# Patient Record
Sex: FEMALE | Race: WHITE | Employment: UNEMPLOYED | URBAN - METROPOLITAN AREA
[De-identification: names, ages, dates, MRNs, and addresses within clinical notes are randomized per-mention and may not be internally consistent; named-entity substitution may affect disease eponyms.]

---

## 2017-10-16 PROBLEM — E04.2 MULTINODULAR GOITER: Status: ACTIVE | Noted: 2017-10-16

## 2017-10-16 PROBLEM — E03.9 PRIMARY HYPOTHYROIDISM: Status: ACTIVE | Noted: 2017-10-16

## 2021-11-19 ENCOUNTER — HOSPITAL ENCOUNTER (OUTPATIENT)
Dept: LAB | Age: 66
Discharge: HOME | End: 2021-11-19
Payer: MEDICARE

## 2021-11-19 DIAGNOSIS — E03.9 PRIMARY HYPOTHYROIDISM: ICD-10-CM

## 2021-11-19 LAB — TSH W FREE THYROID I,TSHELE: 1.53 UIU/ML (ref 0.36–3.74)

## 2021-11-19 PROCEDURE — 36415 COLL VENOUS BLD VENIPUNCTURE: CPT

## 2021-11-19 PROCEDURE — 84443 ASSAY THYROID STIM HORMONE: CPT

## 2022-03-14 PROBLEM — H25.813 COMBINED FORMS OF AGE-RELATED CATARACT OF BOTH EYES: Status: ACTIVE | Noted: 2020-10-28

## 2022-03-14 PROBLEM — M79.641 BILATERAL HAND PAIN: Status: ACTIVE | Noted: 2019-09-30

## 2022-03-14 PROBLEM — H35.363 FAMILIAL DRUSEN OF BOTH EYES: Status: ACTIVE | Noted: 2020-10-28

## 2022-03-14 PROBLEM — M54.12 CERVICAL RADICULOPATHY: Status: ACTIVE | Noted: 2017-04-19

## 2022-03-14 PROBLEM — H35.30 AMD (AGE-RELATED MACULAR DEGENERATION), BILATERAL: Status: ACTIVE | Noted: 2021-01-11

## 2022-03-14 PROBLEM — H11.123 CONJUNCTIVAL CONCRETIONS OF BOTH EYES: Status: ACTIVE | Noted: 2020-11-23

## 2022-03-14 PROBLEM — G54.0 BRACHIAL PLEXOPATHY: Status: ACTIVE | Noted: 2020-06-16

## 2022-03-14 PROBLEM — H04.303 BILATERAL DACRYOCYSTITIS: Status: ACTIVE | Noted: 2021-02-12

## 2022-03-14 PROBLEM — M19.011 ARTHRITIS OF RIGHT ACROMIOCLAVICULAR JOINT: Status: ACTIVE | Noted: 2020-06-03

## 2022-03-14 PROBLEM — K22.70 BARRETT'S ESOPHAGUS: Status: ACTIVE | Noted: 2017-12-28

## 2022-03-14 PROBLEM — M76.899 ENTHESOPATHY OF HIP REGION: Status: ACTIVE | Noted: 2018-07-12

## 2022-03-14 PROBLEM — L72.0: Status: ACTIVE | Noted: 2020-10-28

## 2022-03-14 PROBLEM — R05.9 COUGH: Status: ACTIVE | Noted: 2020-03-24

## 2022-03-14 PROBLEM — M25.511 ACUTE PAIN OF RIGHT SHOULDER: Status: ACTIVE | Noted: 2020-05-15

## 2022-03-14 PROBLEM — I25.118 ATHEROSCLEROTIC HEART DISEASE OF NATIVE CORONARY ARTERY WITH OTHER FORMS OF ANGINA PECTORIS (HCC): Status: ACTIVE | Noted: 2022-03-14

## 2022-03-14 PROBLEM — L20.82 FLEXURAL ECZEMA: Status: ACTIVE | Noted: 2022-03-14

## 2022-03-14 PROBLEM — M79.642 BILATERAL HAND PAIN: Status: ACTIVE | Noted: 2019-09-30

## 2022-03-14 PROBLEM — S63.502A WRIST SPRAIN, LEFT, INITIAL ENCOUNTER: Status: ACTIVE | Noted: 2020-09-23

## 2022-03-14 PROBLEM — H35.361 DRUSEN (DEGENERATIVE) OF MACULA, RIGHT EYE: Status: ACTIVE | Noted: 2020-10-28

## 2022-03-14 PROBLEM — M47.812 CERVICAL SPONDYLOSIS WITHOUT MYELOPATHY: Status: ACTIVE | Noted: 2017-04-19

## 2022-03-14 PROBLEM — H10.13 ALLERGIC CONJUNCTIVITIS OF BOTH EYES: Status: ACTIVE | Noted: 2020-10-28

## 2022-03-14 PROBLEM — M41.50 DEGENERATIVE SCOLIOSIS IN ADULT PATIENT: Status: ACTIVE | Noted: 2021-08-12

## 2022-03-14 PROBLEM — M75.21 BICEPS TENDINITIS OF RIGHT SHOULDER: Status: ACTIVE | Noted: 2020-06-03

## 2022-03-14 PROBLEM — G89.4 CHRONIC PAIN DISORDER: Status: ACTIVE | Noted: 2017-07-26

## 2022-03-14 PROBLEM — I12.9 CHRONIC KIDNEY DISEASE DUE TO HYPERTENSION: Status: ACTIVE | Noted: 2022-03-14

## 2022-03-14 PROBLEM — J30.9 ALLERGIC RHINITIS: Status: ACTIVE | Noted: 2021-10-18

## 2022-03-14 PROBLEM — K29.50 CHRONIC GASTRITIS WITHOUT BLEEDING: Status: ACTIVE | Noted: 2017-05-22

## 2022-03-18 PROBLEM — H11.123 CONJUNCTIVAL CONCRETIONS OF BOTH EYES: Status: ACTIVE | Noted: 2020-11-23

## 2022-03-18 PROBLEM — H04.303 BILATERAL DACRYOCYSTITIS: Status: ACTIVE | Noted: 2021-02-12

## 2022-03-18 PROBLEM — G89.4 CHRONIC PAIN DISORDER: Status: ACTIVE | Noted: 2017-07-26

## 2022-03-18 PROBLEM — M79.641 BILATERAL HAND PAIN: Status: ACTIVE | Noted: 2019-09-30

## 2022-03-18 PROBLEM — E04.2 MULTIPLE THYROID NODULES: Status: ACTIVE | Noted: 2017-10-16

## 2022-03-18 PROBLEM — M25.511 ACUTE PAIN OF RIGHT SHOULDER: Status: ACTIVE | Noted: 2020-05-15

## 2022-03-18 PROBLEM — H35.363 FAMILIAL DRUSEN OF BOTH EYES: Status: ACTIVE | Noted: 2020-10-28

## 2022-03-18 PROBLEM — L20.82 FLEXURAL ECZEMA: Status: ACTIVE | Noted: 2022-03-14

## 2022-03-18 PROBLEM — I12.9 CHRONIC KIDNEY DISEASE DUE TO HYPERTENSION: Status: ACTIVE | Noted: 2022-03-14

## 2022-03-18 PROBLEM — M79.642 BILATERAL HAND PAIN: Status: ACTIVE | Noted: 2019-09-30

## 2022-03-18 PROBLEM — H25.813 COMBINED FORMS OF AGE-RELATED CATARACT OF BOTH EYES: Status: ACTIVE | Noted: 2020-10-28

## 2022-03-18 PROBLEM — I25.118 ATHEROSCLEROTIC HEART DISEASE OF NATIVE CORONARY ARTERY WITH OTHER FORMS OF ANGINA PECTORIS (HCC): Status: ACTIVE | Noted: 2022-03-14

## 2022-03-18 PROBLEM — M75.21 BICEPS TENDINITIS OF RIGHT SHOULDER: Status: ACTIVE | Noted: 2020-06-03

## 2022-03-18 PROBLEM — M76.899 ENTHESOPATHY OF HIP REGION: Status: ACTIVE | Noted: 2018-07-12

## 2022-03-19 PROBLEM — H35.361 DRUSEN (DEGENERATIVE) OF MACULA, RIGHT EYE: Status: ACTIVE | Noted: 2020-10-28

## 2022-03-19 PROBLEM — K22.70 BARRETT'S ESOPHAGUS: Status: ACTIVE | Noted: 2017-12-28

## 2022-03-19 PROBLEM — K29.50 CHRONIC GASTRITIS WITHOUT BLEEDING: Status: ACTIVE | Noted: 2017-05-22

## 2022-03-19 PROBLEM — R05.9 COUGH: Status: ACTIVE | Noted: 2020-03-24

## 2022-03-19 PROBLEM — M54.12 CERVICAL RADICULOPATHY: Status: ACTIVE | Noted: 2017-04-19

## 2022-03-19 PROBLEM — L72.0: Status: ACTIVE | Noted: 2020-10-28

## 2022-03-19 PROBLEM — M47.812 CERVICAL SPONDYLOSIS WITHOUT MYELOPATHY: Status: ACTIVE | Noted: 2017-04-19

## 2022-03-19 PROBLEM — E03.9 PRIMARY HYPOTHYROIDISM: Status: ACTIVE | Noted: 2017-10-16

## 2022-03-19 PROBLEM — S63.502A WRIST SPRAIN, LEFT, INITIAL ENCOUNTER: Status: ACTIVE | Noted: 2020-09-23

## 2022-03-19 PROBLEM — M41.50 DEGENERATIVE SCOLIOSIS IN ADULT PATIENT: Status: ACTIVE | Noted: 2021-08-12

## 2022-03-19 PROBLEM — H35.30 AMD (AGE-RELATED MACULAR DEGENERATION), BILATERAL: Status: ACTIVE | Noted: 2021-01-11

## 2022-03-20 PROBLEM — J30.9 ALLERGIC RHINITIS: Status: ACTIVE | Noted: 2021-10-18

## 2022-03-20 PROBLEM — M19.011 ARTHRITIS OF RIGHT ACROMIOCLAVICULAR JOINT: Status: ACTIVE | Noted: 2020-06-03

## 2022-03-20 PROBLEM — H10.13 ALLERGIC CONJUNCTIVITIS OF BOTH EYES: Status: ACTIVE | Noted: 2020-10-28

## 2022-03-20 PROBLEM — G54.0 BRACHIAL PLEXOPATHY: Status: ACTIVE | Noted: 2020-06-16

## 2022-04-01 ENCOUNTER — HOSPITAL ENCOUNTER (OUTPATIENT)
Dept: MAMMOGRAPHY | Age: 67
Discharge: HOME | End: 2022-04-01
Attending: FAMILY MEDICINE
Payer: MEDICAID

## 2022-04-01 DIAGNOSIS — Z80.3 FAMILY HISTORY OF BREAST CANCER IN FEMALE: ICD-10-CM

## 2022-04-01 DIAGNOSIS — Z80.3 FAMILY HISTORY OF BREAST CANCER: ICD-10-CM

## 2022-04-01 DIAGNOSIS — N64.4 BREAST PAIN: ICD-10-CM

## 2022-04-01 DIAGNOSIS — N64.4 BREAST PAIN IN FEMALE: ICD-10-CM

## 2022-04-01 PROCEDURE — 76642 ULTRASOUND BREAST LIMITED: CPT

## 2022-04-01 PROCEDURE — 77066 DX MAMMO INCL CAD BI: CPT

## 2022-04-13 PROBLEM — R05.9 COUGH: Status: RESOLVED | Noted: 2020-03-24 | Resolved: 2022-04-13

## 2022-04-15 PROBLEM — K31.1 GASTRIC OUTLET OBSTRUCTION: Status: ACTIVE | Noted: 2017-12-28

## 2022-04-15 PROBLEM — N18.31 STAGE 3A CHRONIC KIDNEY DISEASE (HCC): Status: ACTIVE | Noted: 2022-04-15

## 2022-04-15 PROBLEM — K21.9 GASTROESOPHAGEAL REFLUX DISEASE WITHOUT ESOPHAGITIS: Status: ACTIVE | Noted: 2022-04-15

## 2022-04-15 PROBLEM — H43.813 PVD (POSTERIOR VITREOUS DETACHMENT), BOTH EYES: Status: ACTIVE | Noted: 2020-10-28

## 2022-04-15 PROBLEM — H35.89 MACULAR RPE MOTTLING: Status: ACTIVE | Noted: 2020-10-28

## 2022-04-15 PROBLEM — S73.199A TEAR OF ACETABULAR LABRUM: Status: ACTIVE | Noted: 2018-07-12

## 2022-04-15 PROBLEM — K59.01 SLOW TRANSIT CONSTIPATION: Status: ACTIVE | Noted: 2020-01-15

## 2022-04-15 PROBLEM — R73.03 PREDIABETES: Status: ACTIVE | Noted: 2018-11-01

## 2022-04-15 PROBLEM — E55.9 VITAMIN D DEFICIENCY: Status: ACTIVE | Noted: 2018-05-31

## 2022-04-15 PROBLEM — M54.30 SCIATICA: Status: ACTIVE | Noted: 2022-04-15

## 2022-04-15 PROBLEM — M75.41 IMPINGEMENT SYNDROME OF RIGHT SHOULDER: Status: ACTIVE | Noted: 2020-06-03

## 2022-04-15 PROBLEM — R13.14 PHARYNGOESOPHAGEAL DYSPHAGIA: Status: ACTIVE | Noted: 2017-08-29

## 2022-04-15 PROBLEM — R20.2 NUMBNESS AND TINGLING IN BOTH HANDS: Status: ACTIVE | Noted: 2019-09-30

## 2022-04-15 PROBLEM — M79.89 LEFT LEG SWELLING: Status: ACTIVE | Noted: 2021-04-09

## 2022-04-15 PROBLEM — F11.20 OPIOID DEPENDENCE (HCC): Status: ACTIVE | Noted: 2022-04-15

## 2022-04-15 PROBLEM — H52.4 HYPEROPIA WITH REGULAR ASTIGMATISM AND PRESBYOPIA, BILATERAL: Status: ACTIVE | Noted: 2021-02-12

## 2022-04-15 PROBLEM — Q61.02 MULTIPLE RENAL CYSTS: Status: ACTIVE | Noted: 2017-12-28

## 2022-04-15 PROBLEM — R21 RASH: Status: ACTIVE | Noted: 2019-10-15

## 2022-04-15 PROBLEM — F51.01 PRIMARY INSOMNIA: Status: ACTIVE | Noted: 2020-01-15

## 2022-04-15 PROBLEM — E66.9 OBESITY (BMI 30-39.9): Status: ACTIVE | Noted: 2018-04-17

## 2022-04-15 PROBLEM — D50.9 IRON DEFICIENCY ANEMIA: Status: ACTIVE | Noted: 2017-12-28

## 2022-04-15 PROBLEM — H52.223 HYPEROPIA WITH REGULAR ASTIGMATISM AND PRESBYOPIA, BILATERAL: Status: ACTIVE | Noted: 2021-02-12

## 2022-04-15 PROBLEM — E78.2 MIXED HYPERLIPIDEMIA: Status: ACTIVE | Noted: 2018-07-05

## 2022-04-15 PROBLEM — M79.605 LEFT LEG PAIN: Status: ACTIVE | Noted: 2019-07-15

## 2022-04-15 PROBLEM — S46.011A TRAUMATIC COMPLETE TEAR OF RIGHT ROTATOR CUFF: Status: ACTIVE | Noted: 2020-06-03

## 2022-04-15 PROBLEM — M70.61 TROCHANTERIC BURSITIS OF RIGHT HIP: Status: ACTIVE | Noted: 2018-07-27

## 2022-04-15 PROBLEM — H52.03 HYPEROPIA WITH REGULAR ASTIGMATISM AND PRESBYOPIA, BILATERAL: Status: ACTIVE | Noted: 2021-02-12

## 2022-04-15 PROBLEM — R20.0 NUMBNESS AND TINGLING IN BOTH HANDS: Status: ACTIVE | Noted: 2019-09-30

## 2022-04-16 PROBLEM — F11.20 OPIOID DEPENDENCE (HCC): Status: RESOLVED | Noted: 2022-04-15 | Resolved: 2022-04-16

## 2022-06-01 DIAGNOSIS — E03.9 PRIMARY HYPOTHYROIDISM: Primary | ICD-10-CM

## 2022-07-22 ENCOUNTER — OFFICE VISIT (OUTPATIENT)
Dept: FAMILY MEDICINE CLINIC | Facility: CLINIC | Age: 67
End: 2022-07-22
Payer: MEDICARE

## 2022-07-22 ENCOUNTER — HOSPITAL ENCOUNTER (OUTPATIENT)
Dept: GENERAL RADIOLOGY | Age: 67
Discharge: HOME | End: 2022-07-25
Payer: MEDICARE

## 2022-07-22 ENCOUNTER — TELEPHONE (OUTPATIENT)
Dept: FAMILY MEDICINE CLINIC | Facility: CLINIC | Age: 67
End: 2022-07-22

## 2022-07-22 VITALS
DIASTOLIC BLOOD PRESSURE: 80 MMHG | HEIGHT: 65 IN | OXYGEN SATURATION: 98 % | SYSTOLIC BLOOD PRESSURE: 130 MMHG | HEART RATE: 60 BPM | BODY MASS INDEX: 33.15 KG/M2 | WEIGHT: 199 LBS | TEMPERATURE: 98.2 F

## 2022-07-22 DIAGNOSIS — M79.672 LEFT FOOT PAIN: ICD-10-CM

## 2022-07-22 DIAGNOSIS — M25.572 ACUTE LEFT ANKLE PAIN: ICD-10-CM

## 2022-07-22 DIAGNOSIS — L08.9 FOOT INFECTION: ICD-10-CM

## 2022-07-22 DIAGNOSIS — J44.9 CHRONIC OBSTRUCTIVE PULMONARY DISEASE, UNSPECIFIED COPD TYPE (HCC): ICD-10-CM

## 2022-07-22 DIAGNOSIS — Z12.11 SCREEN FOR COLON CANCER: ICD-10-CM

## 2022-07-22 DIAGNOSIS — E55.9 VITAMIN D DEFICIENCY: ICD-10-CM

## 2022-07-22 DIAGNOSIS — R22.41 MASS OF RIGHT FOOT: Primary | ICD-10-CM

## 2022-07-22 DIAGNOSIS — E04.2 MULTIPLE THYROID NODULES: ICD-10-CM

## 2022-07-22 DIAGNOSIS — E03.9 PRIMARY HYPOTHYROIDISM: ICD-10-CM

## 2022-07-22 DIAGNOSIS — M81.0 AGE-RELATED OSTEOPOROSIS WITHOUT CURRENT PATHOLOGICAL FRACTURE: Primary | ICD-10-CM

## 2022-07-22 DIAGNOSIS — R73.03 PREDIABETES: ICD-10-CM

## 2022-07-22 DIAGNOSIS — E78.49 OTHER HYPERLIPIDEMIA: ICD-10-CM

## 2022-07-22 DIAGNOSIS — I10 PRIMARY HYPERTENSION: ICD-10-CM

## 2022-07-22 DIAGNOSIS — K22.719 BARRETT'S ESOPHAGUS WITH DYSPLASIA: ICD-10-CM

## 2022-07-22 DIAGNOSIS — R22.42 LOCALIZED SWELLING OF LEFT FOOT: ICD-10-CM

## 2022-07-22 PROBLEM — N18.31 STAGE 3A CHRONIC KIDNEY DISEASE (HCC): Status: RESOLVED | Noted: 2022-04-15 | Resolved: 2022-07-22

## 2022-07-22 LAB
25(OH)D3 SERPL-MCNC: 24.4 NG/ML (ref 30–100)
ALBUMIN SERPL-MCNC: 3.5 G/DL (ref 3.2–4.6)
ALBUMIN/GLOB SERPL: 1.1 {RATIO} (ref 1.2–3.5)
ALP SERPL-CCNC: 103 U/L (ref 50–136)
ALT SERPL-CCNC: 15 U/L (ref 12–65)
ANION GAP SERPL CALC-SCNC: 7 MMOL/L (ref 7–16)
AST SERPL-CCNC: 14 U/L (ref 15–37)
BASOPHILS # BLD: 0 K/UL (ref 0–0.2)
BASOPHILS NFR BLD: 1 % (ref 0–2)
BILIRUB SERPL-MCNC: 0.7 MG/DL (ref 0.2–1.1)
BUN SERPL-MCNC: 14 MG/DL (ref 8–23)
CALCIUM SERPL-MCNC: 9.3 MG/DL (ref 8.3–10.4)
CHLORIDE SERPL-SCNC: 110 MMOL/L (ref 98–107)
CHOLEST SERPL-MCNC: 108 MG/DL
CO2 SERPL-SCNC: 26 MMOL/L (ref 21–32)
CREAT SERPL-MCNC: 1 MG/DL (ref 0.6–1)
DIFFERENTIAL METHOD BLD: ABNORMAL
EOSINOPHIL # BLD: 0.1 K/UL (ref 0–0.8)
EOSINOPHIL NFR BLD: 1 % (ref 0.5–7.8)
ERYTHROCYTE [DISTWIDTH] IN BLOOD BY AUTOMATED COUNT: 14.2 % (ref 11.9–14.6)
GLOBULIN SER CALC-MCNC: 3.1 G/DL (ref 2.3–3.5)
GLUCOSE SERPL-MCNC: 98 MG/DL (ref 65–100)
HCT VFR BLD AUTO: 34.8 % (ref 35.8–46.3)
HDLC SERPL-MCNC: 49 MG/DL (ref 40–60)
HDLC SERPL: 2.2 {RATIO}
HGB BLD-MCNC: 11 G/DL (ref 11.7–15.4)
IMM GRANULOCYTES # BLD AUTO: 0 K/UL (ref 0–0.5)
IMM GRANULOCYTES NFR BLD AUTO: 0 % (ref 0–5)
LDLC SERPL CALC-MCNC: 42.6 MG/DL
LYMPHOCYTES # BLD: 1.2 K/UL (ref 0.5–4.6)
LYMPHOCYTES NFR BLD: 21 % (ref 13–44)
MCH RBC QN AUTO: 29.4 PG (ref 26.1–32.9)
MCHC RBC AUTO-ENTMCNC: 31.6 G/DL (ref 31.4–35)
MCV RBC AUTO: 93 FL (ref 79.6–97.8)
MONOCYTES # BLD: 0.4 K/UL (ref 0.1–1.3)
MONOCYTES NFR BLD: 7 % (ref 4–12)
NEUTS SEG # BLD: 4.1 K/UL (ref 1.7–8.2)
NEUTS SEG NFR BLD: 70 % (ref 43–78)
NRBC # BLD: 0 K/UL (ref 0–0.2)
PLATELET # BLD AUTO: 225 K/UL (ref 150–450)
PMV BLD AUTO: 11.1 FL (ref 9.4–12.3)
POTASSIUM SERPL-SCNC: 4.6 MMOL/L (ref 3.5–5.1)
PROT SERPL-MCNC: 6.6 G/DL (ref 6.3–8.2)
RBC # BLD AUTO: 3.74 M/UL (ref 4.05–5.2)
SODIUM SERPL-SCNC: 143 MMOL/L (ref 136–145)
TRIGL SERPL-MCNC: 82 MG/DL (ref 35–150)
TSH W FREE THYROID IF ABNORMAL: 0.9 UIU/ML (ref 0.36–3.74)
URATE SERPL-MCNC: 4.4 MG/DL (ref 2.6–6)
VLDLC SERPL CALC-MCNC: 16.4 MG/DL (ref 6–23)
WBC # BLD AUTO: 5.9 K/UL (ref 4.3–11.1)

## 2022-07-22 PROCEDURE — 1123F ACP DISCUSS/DSCN MKR DOCD: CPT | Performed by: FAMILY MEDICINE

## 2022-07-22 PROCEDURE — 73620 X-RAY EXAM OF FOOT: CPT

## 2022-07-22 PROCEDURE — 73600 X-RAY EXAM OF ANKLE: CPT

## 2022-07-22 PROCEDURE — 99214 OFFICE O/P EST MOD 30 MIN: CPT | Performed by: FAMILY MEDICINE

## 2022-07-22 RX ORDER — NAPROXEN 500 MG/1
500 TABLET ORAL 2 TIMES DAILY WITH MEALS
Qty: 20 TABLET | Refills: 0 | Status: SHIPPED | OUTPATIENT
Start: 2022-07-22 | End: 2022-07-22 | Stop reason: CLARIF

## 2022-07-22 RX ORDER — CEPHALEXIN 500 MG/1
500 CAPSULE ORAL 2 TIMES DAILY
Qty: 14 CAPSULE | Refills: 0 | Status: SHIPPED | OUTPATIENT
Start: 2022-07-22 | End: 2022-07-29

## 2022-07-22 RX ORDER — NAPROXEN 375 MG/1
375 TABLET ORAL 2 TIMES DAILY PRN
Qty: 20 TABLET | Refills: 1 | Status: SHIPPED | OUTPATIENT
Start: 2022-07-22

## 2022-07-22 ASSESSMENT — PATIENT HEALTH QUESTIONNAIRE - PHQ9
SUM OF ALL RESPONSES TO PHQ QUESTIONS 1-9: 0
SUM OF ALL RESPONSES TO PHQ QUESTIONS 1-9: 0
2. FEELING DOWN, DEPRESSED OR HOPELESS: 0
SUM OF ALL RESPONSES TO PHQ9 QUESTIONS 1 & 2: 0
1. LITTLE INTEREST OR PLEASURE IN DOING THINGS: 0
SUM OF ALL RESPONSES TO PHQ QUESTIONS 1-9: 0
SUM OF ALL RESPONSES TO PHQ QUESTIONS 1-9: 0

## 2022-07-22 NOTE — TELEPHONE ENCOUNTER
----- Message from [facility], [address] sent at 7/22/2022  1:32 PM EDT -----    ----- Message -----  From: Navin Meadows MD  Sent: 7/22/2022  12:02 PM EDT  To: Mallory Arenas MA    There is SOFT TISSUE SWELLING AT THE FOOT  ANKLEJOINT(LISFRANC)----they cannot see gas or bony abnormality and recommend MRI---I will order mri of foot and have her to follow up afterward. JUST IN CASE OF INFECTION, I WILL CALL IN SHORT COURSE OF ANTIBIOTICS---SHE IS TO FOLLOW WITH ME AFTER THE MRI.

## 2022-07-22 NOTE — TELEPHONE ENCOUNTER
Talked to pt she would like to go to [facility] for the MRI, due to already having an appointment next Friday for her shoulder, I did try to call but no answer will try again on Monday.

## 2022-07-23 LAB
EST. AVERAGE GLUCOSE BLD GHB EST-MCNC: 128 MG/DL
HBA1C MFR BLD: 6.1 % (ref 4.8–5.6)

## 2022-07-23 ASSESSMENT — ENCOUNTER SYMPTOMS
ABDOMINAL PAIN: 0
PHOTOPHOBIA: 0
SORE THROAT: 0
EYE PAIN: 0
COUGH: 0
NAUSEA: 0
ABDOMINAL DISTENTION: 0
BLURRED VISION: 0
COLOR CHANGE: 0
SHORTNESS OF BREATH: 0
BLOOD IN STOOL: 0

## 2022-07-23 NOTE — PROGRESS NOTES
Remi Kessler  1955 is a 78 y.o. female ,Established patient, here for evaluation of the following chief complaint(s):   Chief Complaint   Patient presents with    3 Month Follow-Up    Cholesterol Problem     Pt is not fasting    Foot Pain     2 days ago she noticed a knot come up on the top of her left foot and now is swelling and painful. 1. Age-related osteoporosis without current pathological fracture  -     DEXA BONE DENSITY AXIAL SKELETON; Future  2. Vitamin D deficiency  -     Vitamin D 25 Hydroxy; Future  3. Prediabetes  -     Hemoglobin A1C; Future  4. Primary hypertension  -     CBC with Auto Differential; Future  -     Comprehensive Metabolic Panel; Future  5. Other hyperlipidemia  -     Lipid Panel; Future  6. Screen for colon cancer  -     Occult Blood, Fecal; Future  7. Acute left ankle pain  -     Uric Acid; Future  -     XR ANKLE LEFT (2 VIEWS); Future  -     naproxen (NAPROSYN) 375 MG tablet; Take 1 tablet by mouth 2 times daily as needed for Pain, Disp-20 tablet, R-1Normal  8. Localized swelling of left foot--POSSIBLE LISFRANC LIGAMENT INJURY  -     Uric Acid; Future  -     XR FOOT LEFT (2 VIEWS); Future  -     naproxen (NAPROSYN) 375 MG tablet; Take 1 tablet by mouth 2 times daily as needed for Pain, Disp-20 tablet, R-1Normal  9. Left foot pain  -     Uric Acid; Future  -     XR FOOT LEFT (2 VIEWS); Future  -     naproxen (NAPROSYN) 375 MG tablet; Take 1 tablet by mouth 2 times daily as needed for Pain, Disp-20 tablet, R-1Normal  10. Primary hypothyroidism  -     TSH with Reflex  11. Chronic obstructive pulmonary disease, unspecified COPD type ([facility] [address]  Assessment & Plan:   At goal, continue current medications  12. Alicia's esophagus with dysplasia  Assessment & Plan:   Monitored by specialist- no acute findings meriting change in the plan  13.  Multiple thyroid nodules  Assessment & Plan:   Monitored by specialist- no acute findings meriting change in the plan        Return in about 3 months (around 10/22/2022). Subjective   SUBJECTIVE/OBJECTIVE:  Foot Pain   The pain is present in the left foot and left ankle. This is a new (A KNOT JUST CAME UP ON HER FOOT WED AND IT IS VERY PAINFUL) problem. The current episode started yesterday. There has been no history of extremity trauma. The problem occurs constantly. The problem has been unchanged. Pertinent negatives include no fever. Hypertension  This is a chronic problem. The current episode started more than 1 year ago. The problem has been gradually improving since onset. Pertinent negatives include no anxiety, blurred vision, chest pain, headaches, palpitations, peripheral edema, PND, shortness of breath or sweats. There is no history of chronic renal disease. Hyperlipidemia  This is a chronic problem. The current episode started more than 1 year ago. The problem is controlled. Recent lipid tests were reviewed and are normal. She has no history of chronic renal disease. Pertinent negatives include no chest pain, myalgias or shortness of breath. Review of Systems   Constitutional:  Negative for chills and fever. HENT:  Negative for ear pain, hearing loss and sore throat. Eyes:  Negative for blurred vision, photophobia and pain. Respiratory:  Negative for cough and shortness of breath. Cardiovascular:  Negative for chest pain, palpitations, leg swelling and PND. Gastrointestinal:  Negative for abdominal distention, abdominal pain, blood in stool and nausea. Genitourinary:  Negative for dysuria and urgency. Musculoskeletal:  Negative for joint swelling and myalgias. Skin:  Negative for color change, pallor and rash. Neurological:  Negative for speech difficulty, weakness, light-headedness and headaches. Hematological:  Negative for adenopathy. Psychiatric/Behavioral:  Negative for agitation, confusion, hallucinations, self-injury and suicidal ideas.          Objective   Physical Exam  Constitutional: Appearance: Normal appearance. HENT:      Head: Normocephalic and atraumatic. Nose: Nose normal.   Eyes:      Extraocular Movements: Extraocular movements intact. Conjunctiva/sclera: Conjunctivae normal.      Pupils: Pupils are equal, round, and reactive to light. Cardiovascular:      Rate and Rhythm: Normal rate and regular rhythm. Pulses: Normal pulses. Heart sounds: Normal heart sounds. Pulmonary:      Effort: Pulmonary effort is normal.      Breath sounds: Normal breath sounds. Abdominal:      General: Abdomen is flat. Bowel sounds are normal.      Palpations: Abdomen is soft. Musculoskeletal:      Comments: NODULAR AREA DORSUM OF FOOT AT JUNCTURE OF FOOT AND ANKLE   Skin:     General: Skin is warm and dry. Neurological:      General: No focal deficit present. Mental Status: She is alert and oriented to person, place, and time. Psychiatric:         Mood and Affect: Mood normal.                 An electronic signature was used to authenticate this note.     --Mario Mosqueda MD

## 2022-07-28 ENCOUNTER — OFFICE VISIT (OUTPATIENT)
Dept: CARDIOLOGY CLINIC | Age: 67
End: 2022-07-28
Payer: MEDICARE

## 2022-07-28 VITALS
SYSTOLIC BLOOD PRESSURE: 124 MMHG | DIASTOLIC BLOOD PRESSURE: 58 MMHG | HEART RATE: 64 BPM | HEIGHT: 65 IN | BODY MASS INDEX: 33.32 KG/M2 | WEIGHT: 200 LBS

## 2022-07-28 DIAGNOSIS — Z91.89 AT RISK FOR SLEEP APNEA: ICD-10-CM

## 2022-07-28 DIAGNOSIS — I10 HTN (HYPERTENSION), BENIGN: ICD-10-CM

## 2022-07-28 DIAGNOSIS — I25.118 ATHEROSCLEROTIC HEART DISEASE OF NATIVE CORONARY ARTERY WITH OTHER FORMS OF ANGINA PECTORIS (HCC): Primary | ICD-10-CM

## 2022-07-28 DIAGNOSIS — E78.2 MIXED HYPERLIPIDEMIA: ICD-10-CM

## 2022-07-28 PROCEDURE — 1123F ACP DISCUSS/DSCN MKR DOCD: CPT | Performed by: INTERNAL MEDICINE

## 2022-07-28 PROCEDURE — 99214 OFFICE O/P EST MOD 30 MIN: CPT | Performed by: INTERNAL MEDICINE

## 2022-07-28 RX ORDER — ATORVASTATIN CALCIUM 40 MG/1
40 TABLET, FILM COATED ORAL DAILY
Qty: 90 TABLET | Refills: 3 | Status: SHIPPED | OUTPATIENT
Start: 2022-07-28 | End: 2022-10-26

## 2022-07-28 RX ORDER — AMLODIPINE BESYLATE 5 MG/1
5 TABLET ORAL DAILY
Qty: 90 TABLET | Refills: 3 | Status: SHIPPED | OUTPATIENT
Start: 2022-07-28 | End: 2022-10-26

## 2022-07-28 RX ORDER — NEBIVOLOL 10 MG/1
10 TABLET ORAL DAILY
Qty: 90 TABLET | Refills: 0 | Status: SHIPPED | OUTPATIENT
Start: 2022-07-28 | End: 2022-10-26

## 2022-07-28 NOTE — PROGRESS NOTES
[facility], [address] [place] [address], [address] [place] [address]  PHONE: [phone]    SUBJECTIVE:   Ernestina Carrel is a 78 y.o. female 1955   seen for a follow up visit regarding the following:     Chief Complaint   Patient presents with    Coronary Artery Disease         History of present illness: 78 y.o. female presented for follow-up 7/28/22 The patient presented for follow up 07/28/22. Fatigue worse. Washing Dishes several days per week. Interval Hx    Patient is very concerned with CAD due to family with history of MI. Back surgery pain no better. Tearful today. Pain. The patient had a previous history of hypertension, as well as nonobstructive coronary artery disease. She presented off schedule 04/27/2018. Unfortunately, she had an episode of angioedema that occurred associated with ACE inhibitor therapy. The patient  was seen in [facility]. She was given steroids, as well as Benadryl with resolution. Her Lisinopril was discontinued. She presented 11/30/2018 scheduled back surgery possibly due to bulging disc. Cardiac history:    Cardiac catheterization was in 2010 at [facility]. 2015 with stress perfusion imaging that was normal.    Cardiac catheterization 5/11/2016 mild nonobstructive coronary disease with moderate diffuse calcifications noted. left main angiographically normal, LAD mild nonobstructive disease with focal stenosis at the origin in the first diagonal branch, 20%  narrowing at the circumflex, small low grade calcification luminal irregularities, right coronary and large PDA with 30% stenosis. Ranexa initiated 12/2016 with improvement of some symptoms patient discontinued. CT abdomen with normal adrenal glands. 12/2017 Adriantragarry 61 total cholesterol 116, triglycerides 70, HDL 50, LDL 52, VLDL 14.    04/2018 angioedema on ACE inhibitor.     04/27/2018 Metoprolol was changed to Bystolic. Initiated on Aldactazide, on Amlodipine 5 mg. LAbs 10/23 [facility] potassium 3.2 creatine 1.2    10/4/2019 EKG siuns rhythm    10/2019 NST normal    4/2021 Carotids <50% stenosis    1/10/2022 sinus rhythm, normal rate, normal MA intervals, ST wave normal, normal axis,      Assessment and Plan: At risk for MUSHTAQ   The patient endorses a history of morning headaches, daytime fatigue, daytime somnolence, witnessed apnea, witnessed snoring. CAD    Sx controlled    Hypertension,    controlled. Previous angioedema on ACE. Placed on Bystolic and Aldactazide amlodipine. Leslie Ramirez Hedrick CAD CHF Meds            atorvastatin (LIPITOR) 40 MG tablet (Taking)    Sig - Route: Take 1 tablet by mouth in the morning. - Oral    nebivolol (BYSTOLIC) 10 MG tablet (Taking)    Sig - Route: Take 1 tablet by mouth in the morning. - Oral    amLODIPine (NORVASC) 5 MG tablet (Taking)    Sig - Route: Take 1 tablet by mouth in the morning. - Oral    spironolactone-hydroCHLOROthiazide (ALDACTAZIDE) 25-25 MG per tablet (Taking)    Class: Historical Med             Shortness of breath,    resolved. Hypokalemia low dose potassium    controlled    CT abd 2017 with no adrenal abnormalities. labs from [address] creatine 1.39 1/2020  patient cautioned on nsaid use. HPL    labs reviewed from jan 2020 LDL 58  [facility]    Carotid disease last checked 2017    recheck at appropriate interval    Mild disease 11/2020               Current Outpatient Medications   Medication Sig    atorvastatin (LIPITOR) 40 MG tablet Take 1 tablet by mouth in the morning. nebivolol (BYSTOLIC) 10 MG tablet Take 1 tablet by mouth in the morning. amLODIPine (NORVASC) 5 MG tablet Take 1 tablet by mouth in the morning. naproxen (NAPROSYN) 375 MG tablet Take 1 tablet by mouth 2 times daily as needed for Pain    cephALEXin (KEFLEX) 500 MG capsule Take 1 capsule by mouth in the morning and 1 capsule before bedtime. Do all this for 7 days. Fluticasone-Umeclidin-Vilant (TRELEGY ELLIPTA) 200-62.5-25 MCG/INH AEPB INHALE 1 PUFF DAILY    levothyroxine (SYNTHROID) 88 MCG tablet Take 88 mcg by mouth every morning (before breakfast)    magnesium oxide (MAG-OX) 400 MG tablet Take 400 mg by mouth daily    nitroGLYCERIN (NITROSTAT) 0.4 MG SL tablet Place 0.4 mg under the tongue    oxyCODONE (OXY-IR) 15 MG immediate release tablet TAKE 1 TABLET EVERY 6 HOURS    Roflumilast (DALIRESP) 500 MCG tablet Take 500 mcg by mouth daily    spironolactone-hydroCHLOROthiazide (ALDACTAZIDE) 25-25 MG per tablet Take 1 tablet by mouth daily    zolpidem (AMBIEN) 10 MG tablet Take 10 mg by mouth. No current facility-administered medications for this visit. Past Medical History, Past Surgical History, Family history, Social History, and Medications were all reviewed with the patient today and updated as necessary. Allergies   Allergen Reactions    Ace Inhibitors Angioedema and Swelling     LIPS, FACE AND THROAT      Codeine Itching and Other (See Comments)     DERIVATIVES  Other reaction(s): hives    Morphine Anxiety and Other (See Comments)     Other reaction(s): Rage  \" only in pill form,\" caused severe agitation.      Past Medical History:   Diagnosis Date    Arthritis     Asthma     Atherosclerotic heart disease of native coronary artery with other forms of angina pectoris ([facility] [address] 3/14/2022    COPD (chronic obstructive pulmonary disease) ([facility] [address] 10/5/2015    GERD (gastroesophageal reflux disease)     HTN (hypertension), benign 10/5/2015    MUSHTAQ (obstructive sleep apnea) 4/28/2016    Psychiatric disorder     anxiety    Thyroid disease      Past Surgical History:   Procedure Laterality Date    APPENDECTOMY      CHOLECYSTECTOMY      HYSTERECTOMY (CERVIX STATUS UNKNOWN)      ORTHOPEDIC SURGERY      neck x4, back x 5 - LAST ONE IN 2021    OTHER SURGICAL HISTORY      L3-L4 FUSION    ROTATOR CUFF REPAIR Right 06/11/2020    TUBAL LIGATION       Family History   Problem Relation Age of Onset    Cancer Sister     Diabetes Sister     Breast Cancer Sister     Heart Attack Father     Breast Cancer Sister     Cancer Mother     Thyroid Disease Mother         goiter    Hypertension Father     Cancer Brother       Social History     Tobacco Use    Smoking status: Former     Packs/day: 2.00     Years: 40.00     Pack years: 80.00     Types: Cigarettes     Quit date: 2014     Years since quittin.2    Smokeless tobacco: Former   Substance Use Topics    Alcohol use: No     Alcohol/week: 0.0 standard drinks       ROS:    ROS        PHYSICAL EXAM:    BP (!) 124/58   Pulse 64   Ht 5' 5\" (1.651 m)   Wt 200 lb (90.7 kg)   BMI 33.28 kg/m²        Wt Readings from Last 3 Encounters:   22 200 lb (90.7 kg)   22 199 lb (90.3 kg)   22 194 lb 3.2 oz (88.1 kg)     BP Readings from Last 3 Encounters:   22 (!) 124/58   22 130/80   22 138/82         Physical Exam    Medical problems and test results were reviewed with the patient today.            Recent Results (from the past 672 hour(s))   Uric Acid    Collection Time: 22  9:32 AM   Result Value Ref Range    Uric Acid 4.4 2.6 - 6.0 MG/DL   Vitamin D 25 Hydroxy    Collection Time: 22  9:32 AM   Result Value Ref Range    Vit D, 25-Hydroxy 24.4 (L) 30.0 - 100.0 ng/mL   Hemoglobin A1C    Collection Time: 22  9:32 AM   Result Value Ref Range    Hemoglobin A1C 6.1 (H) 4.8 - 5.6 %    eAG 128 mg/dL   Lipid Panel    Collection Time: 22  9:32 AM   Result Value Ref Range    Cholesterol, Total 108 <200 MG/DL    Triglycerides 82 35 - 150 MG/DL    HDL 49 40 - 60 MG/DL    LDL Calculated 42.6 <100 MG/DL    VLDL Cholesterol Calculated 16.4 6.0 - 23.0 MG/DL    Chol/HDL Ratio 2.2     CBC with Auto Differential    Collection Time: 22  9:32 AM   Result Value Ref Range    WBC 5.9 4.3 - 11.1 K/uL    RBC 3.74 (L) 4.05 - 5.2 M/uL    Hemoglobin 11.0 (L) 11.7 - 15.4 g/dL    Hematocrit 34.8 (L) 35.8 - 46.3 % MCV 93.0 79.6 - 97.8 FL    MCH 29.4 26.1 - 32.9 PG    MCHC 31.6 31.4 - 35.0 g/dL    RDW 14.2 11.9 - 14.6 %    Platelets 642 149 - 819 K/uL    MPV 11.1 9.4 - 12.3 FL    nRBC 0.00 0.0 - 0.2 K/uL    Differential Type AUTOMATED      Seg Neutrophils 70 43 - 78 %    Lymphocytes 21 13 - 44 %    Monocytes 7 4.0 - 12.0 %    Eosinophils % 1 0.5 - 7.8 %    Basophils 1 0.0 - 2.0 %    Immature Granulocytes 0 0.0 - 5.0 %    Segs Absolute 4.1 1.7 - 8.2 K/UL    Absolute Lymph # 1.2 0.5 - 4.6 K/UL    Absolute Mono # 0.4 0.1 - 1.3 K/UL    Absolute Eos # 0.1 0.0 - 0.8 K/UL    Basophils Absolute 0.0 0.0 - 0.2 K/UL    Absolute Immature Granulocyte 0.0 0.0 - 0.5 K/UL   Comprehensive Metabolic Panel    Collection Time: 07/22/22  9:32 AM   Result Value Ref Range    Sodium 143 136 - 145 mmol/L    Potassium 4.6 3.5 - 5.1 mmol/L    Chloride 110 (H) 98 - 107 mmol/L    CO2 26 21 - 32 mmol/L    Anion Gap 7 7 - 16 mmol/L    Glucose 98 65 - 100 mg/dL    BUN 14 8 - 23 MG/DL    Creatinine 1.00 0.6 - 1.0 MG/DL    GFR African American >60 >60 ml/min/1.73m2    GFR Non- 59 (L) >60 ml/min/1.73m2    Calcium 9.3 8.3 - 10.4 MG/DL    Total Bilirubin 0.7 0.2 - 1.1 MG/DL    ALT 15 12 - 65 U/L    AST 14 (L) 15 - 37 U/L    Alk Phosphatase 103 50 - 136 U/L    Total Protein 6.6 6.3 - 8.2 g/dL    Albumin 3.5 3.2 - 4.6 g/dL    Globulin 3.1 2.3 - 3.5 g/dL    Albumin/Globulin Ratio 1.1 (L) 1.2 - 3.5     TSH with Reflex    Collection Time: 07/22/22  9:32 AM   Result Value Ref Range    TSH w Free Thyroid if Abnormal 0.90 0.358 - 3.740 UIU/ML     Lab Results   Component Value Date/Time    CHOL 108 07/22/2022 09:32 AM    HDL 49 07/22/2022 09:32 AM    VLDL 20 03/14/2022 11:11 AM              ERIN [place] was seen today for coronary artery disease.     Diagnoses and all orders for this visit:    Atherosclerotic heart disease of native coronary artery with other forms of angina pectoris (HCC)    HTN (hypertension), benign    Mixed hyperlipidemia    At risk for sleep apnea  -     Home Sleep Study; Future    Other orders  -     atorvastatin (LIPITOR) 40 MG tablet; Take 1 tablet by mouth in the morning.  -     nebivolol (BYSTOLIC) 10 MG tablet; Take 1 tablet by mouth in the morning.  -     amLODIPine (NORVASC) 5 MG tablet; Take 1 tablet by mouth in the morning. Return in about 6 months (around 1/28/2023).        Delio Escobar MD  7/28/2022  1:50 PM

## 2022-07-29 ENCOUNTER — TELEPHONE (OUTPATIENT)
Dept: FAMILY MEDICINE CLINIC | Facility: CLINIC | Age: 67
End: 2022-07-29

## 2022-07-29 DIAGNOSIS — M25.572 ACUTE LEFT ANKLE PAIN: ICD-10-CM

## 2022-07-29 DIAGNOSIS — M79.672 LEFT FOOT PAIN: Primary | ICD-10-CM

## 2022-07-29 NOTE — TELEPHONE ENCOUNTER
Talked to pt let her know I am faxing over her MRI referral and gave her the number I was told to give her to call to make the appointment. I advised to wait till Monday to give them time to get the fax. Also let her know to request to have it done at [facility]. Discharged

## 2022-08-12 ENCOUNTER — HOSPITAL ENCOUNTER (OUTPATIENT)
Dept: MAMMOGRAPHY | Age: 67
Discharge: HOME | End: 2022-08-15
Payer: MEDICARE

## 2022-08-12 VITALS — HEIGHT: 65 IN | BODY MASS INDEX: 32.82 KG/M2 | WEIGHT: 197 LBS

## 2022-08-12 DIAGNOSIS — M81.0 AGE-RELATED OSTEOPOROSIS WITHOUT CURRENT PATHOLOGICAL FRACTURE: ICD-10-CM

## 2022-08-12 PROCEDURE — 77080 DXA BONE DENSITY AXIAL: CPT

## 2022-09-19 DIAGNOSIS — G47.00 INSOMNIA, UNSPECIFIED TYPE: Primary | ICD-10-CM

## 2022-09-19 RX ORDER — ZOLPIDEM TARTRATE 10 MG/1
TABLET ORAL
Qty: 90 TABLET | Refills: 0 | Status: SHIPPED | OUTPATIENT
Start: 2022-09-19 | End: 2022-12-18

## 2022-10-18 ENCOUNTER — OFFICE VISIT (OUTPATIENT)
Dept: FAMILY MEDICINE CLINIC | Facility: CLINIC | Age: 67
End: 2022-10-18
Payer: MEDICARE

## 2022-10-18 VITALS
SYSTOLIC BLOOD PRESSURE: 138 MMHG | HEART RATE: 78 BPM | HEIGHT: 65 IN | BODY MASS INDEX: 33.89 KG/M2 | OXYGEN SATURATION: 98 % | WEIGHT: 203.4 LBS | DIASTOLIC BLOOD PRESSURE: 62 MMHG | TEMPERATURE: 98 F

## 2022-10-18 DIAGNOSIS — D50.8 IRON DEFICIENCY ANEMIA SECONDARY TO INADEQUATE DIETARY IRON INTAKE: ICD-10-CM

## 2022-10-18 DIAGNOSIS — R73.03 PREDIABETES: ICD-10-CM

## 2022-10-18 DIAGNOSIS — E78.2 MIXED HYPERLIPIDEMIA: ICD-10-CM

## 2022-10-18 DIAGNOSIS — G47.00 INSOMNIA, UNSPECIFIED TYPE: ICD-10-CM

## 2022-10-18 DIAGNOSIS — E55.9 VITAMIN D DEFICIENCY: Primary | ICD-10-CM

## 2022-10-18 DIAGNOSIS — E03.8 OTHER SPECIFIED HYPOTHYROIDISM: ICD-10-CM

## 2022-10-18 PROBLEM — M75.112 NONTRAUMATIC INCOMPLETE TEAR OF LEFT ROTATOR CUFF: Status: ACTIVE | Noted: 2022-08-13

## 2022-10-18 PROBLEM — M75.42 IMPINGEMENT SYNDROME OF LEFT SHOULDER: Status: ACTIVE | Noted: 2022-08-13

## 2022-10-18 LAB
BASOPHILS # BLD: 0 K/UL (ref 0–0.2)
BASOPHILS NFR BLD: 1 % (ref 0–2)
DIFFERENTIAL METHOD BLD: ABNORMAL
EOSINOPHIL # BLD: 0.2 K/UL (ref 0–0.8)
EOSINOPHIL NFR BLD: 2 % (ref 0.5–7.8)
ERYTHROCYTE [DISTWIDTH] IN BLOOD BY AUTOMATED COUNT: 13.9 % (ref 11.9–14.6)
EST. AVERAGE GLUCOSE BLD GHB EST-MCNC: 134 MG/DL
HBA1C MFR BLD: 6.3 % (ref 4.8–5.6)
HCT VFR BLD AUTO: 33.8 % (ref 35.8–46.3)
HGB BLD-MCNC: 10.3 G/DL (ref 11.7–15.4)
IMM GRANULOCYTES # BLD AUTO: 0 K/UL (ref 0–0.5)
IMM GRANULOCYTES NFR BLD AUTO: 0 % (ref 0–5)
LYMPHOCYTES # BLD: 1.3 K/UL (ref 0.5–4.6)
LYMPHOCYTES NFR BLD: 19 % (ref 13–44)
MCH RBC QN AUTO: 28.8 PG (ref 26.1–32.9)
MCHC RBC AUTO-ENTMCNC: 30.5 G/DL (ref 31.4–35)
MCV RBC AUTO: 94.4 FL (ref 82–102)
MONOCYTES # BLD: 0.5 K/UL (ref 0.1–1.3)
MONOCYTES NFR BLD: 8 % (ref 4–12)
NEUTS SEG # BLD: 4.6 K/UL (ref 1.7–8.2)
NEUTS SEG NFR BLD: 70 % (ref 43–78)
NRBC # BLD: 0 K/UL (ref 0–0.2)
PLATELET # BLD AUTO: 206 K/UL (ref 150–450)
PMV BLD AUTO: 11.1 FL (ref 9.4–12.3)
RBC # BLD AUTO: 3.58 M/UL (ref 4.05–5.2)
WBC # BLD AUTO: 6.5 K/UL (ref 4.3–11.1)

## 2022-10-18 PROCEDURE — 99214 OFFICE O/P EST MOD 30 MIN: CPT | Performed by: FAMILY MEDICINE

## 2022-10-18 PROCEDURE — 1123F ACP DISCUSS/DSCN MKR DOCD: CPT | Performed by: FAMILY MEDICINE

## 2022-10-18 RX ORDER — ZOLPIDEM TARTRATE 10 MG/1
TABLET ORAL
Qty: 90 TABLET | Refills: 0 | Status: CANCELLED | OUTPATIENT
Start: 2022-10-18 | End: 2023-01-16

## 2022-10-18 ASSESSMENT — PATIENT HEALTH QUESTIONNAIRE - PHQ9
SUM OF ALL RESPONSES TO PHQ QUESTIONS 1-9: 0
1. LITTLE INTEREST OR PLEASURE IN DOING THINGS: 0
SUM OF ALL RESPONSES TO PHQ9 QUESTIONS 1 & 2: 0
SUM OF ALL RESPONSES TO PHQ QUESTIONS 1-9: 0
2. FEELING DOWN, DEPRESSED OR HOPELESS: 0

## 2022-10-19 LAB
25(OH)D3 SERPL-MCNC: 22.9 NG/ML (ref 30–100)
ALBUMIN SERPL-MCNC: 3.3 G/DL (ref 3.2–4.6)
ALBUMIN/GLOB SERPL: 1.2 {RATIO} (ref 0.4–1.6)
ALP SERPL-CCNC: 95 U/L (ref 50–136)
ALT SERPL-CCNC: 19 U/L (ref 12–65)
ANION GAP SERPL CALC-SCNC: 3 MMOL/L (ref 2–11)
AST SERPL-CCNC: 15 U/L (ref 15–37)
BILIRUB SERPL-MCNC: 0.6 MG/DL (ref 0.2–1.1)
BUN SERPL-MCNC: 15 MG/DL (ref 8–23)
CALCIUM SERPL-MCNC: 9.3 MG/DL (ref 8.3–10.4)
CHLORIDE SERPL-SCNC: 110 MMOL/L (ref 101–110)
CHOLEST SERPL-MCNC: 109 MG/DL
CO2 SERPL-SCNC: 27 MMOL/L (ref 21–32)
CREAT SERPL-MCNC: 1.2 MG/DL (ref 0.6–1)
FERRITIN SERPL-MCNC: 15 NG/ML (ref 8–388)
GLOBULIN SER CALC-MCNC: 2.7 G/DL (ref 2.8–4.5)
GLUCOSE SERPL-MCNC: 107 MG/DL (ref 65–100)
HDLC SERPL-MCNC: 51 MG/DL (ref 40–60)
HDLC SERPL: 2.1 {RATIO}
IRON SERPL-MCNC: 49 UG/DL (ref 35–150)
LDLC SERPL CALC-MCNC: 35.4 MG/DL
POTASSIUM SERPL-SCNC: 4.2 MMOL/L (ref 3.5–5.1)
PROT SERPL-MCNC: 6 G/DL (ref 6.3–8.2)
SODIUM SERPL-SCNC: 140 MMOL/L (ref 133–143)
TRIGL SERPL-MCNC: 113 MG/DL (ref 35–150)
TSH, 3RD GENERATION: 0.76 UIU/ML (ref 0.36–3.74)
VLDLC SERPL CALC-MCNC: 22.6 MG/DL (ref 6–23)

## 2022-10-20 ASSESSMENT — ENCOUNTER SYMPTOMS
COLOR CHANGE: 0
COUGH: 0
HOARSE VOICE: 0
ABDOMINAL PAIN: 0
PHOTOPHOBIA: 0
BLURRED VISION: 0
EYE PAIN: 0
BLOOD IN STOOL: 0
ABDOMINAL DISTENTION: 0
CONSTIPATION: 0
SORE THROAT: 0
SHORTNESS OF BREATH: 0
NAUSEA: 0

## 2022-10-21 DIAGNOSIS — Z12.11 SCREEN FOR COLON CANCER: Primary | ICD-10-CM

## 2022-10-21 LAB
HEMOCCULT STL QL: NEGATIVE
VALID INTERNAL CONTROL: YES

## 2022-10-21 PROCEDURE — G0328 FECAL BLOOD SCRN IMMUNOASSAY: HCPCS | Performed by: FAMILY MEDICINE

## 2022-10-21 NOTE — PROGRESS NOTES
Troy Quinonez  1955 is a 78 y.o. female ,Established patient, here for evaluation of the following chief complaint(s):   Chief Complaint   Patient presents with    Follow-up     ON BONE DENSITY           1. Vitamin D deficiency  -     Vitamin D 25 Hydroxy; Future  2. Insomnia, unspecified type--chronic stable. 3. Prediabetes  -     CBC with Auto Differential; Future  -     Comprehensive Metabolic Panel; Future  -     Hemoglobin A1C; Future  4. Iron deficiency anemia secondary to inadequate dietary iron intake  -     Ferritin; Future  -     Iron; Future  5. Other specified hypothyroidism  -     TSH; Future  6. Mixed hyperlipidemia  -     Lipid Panel; Future        Return in about 3 months (around 1/18/2023). Subjective   SUBJECTIVE/OBJECTIVE:  Hypertension  This is a chronic problem. The current episode started more than 1 year ago. The problem has been gradually improving since onset. The problem is controlled. Pertinent negatives include no anxiety, blurred vision, chest pain, headaches, palpitations, peripheral edema, PND, shortness of breath or sweats. Identifiable causes of hypertension include a thyroid problem. Thyroid Problem  Presents for follow-up visit. Patient reports no anxiety, cold intolerance, constipation, hoarse voice, leg swelling, menstrual problem or palpitations. The symptoms have been improving. Review of Systems   Constitutional:  Negative for chills and fever. HENT:  Negative for ear pain, hearing loss, hoarse voice and sore throat. Eyes:  Negative for blurred vision, photophobia and pain. Respiratory:  Negative for cough and shortness of breath. Cardiovascular:  Negative for chest pain, palpitations, leg swelling and PND. Gastrointestinal:  Negative for abdominal distention, abdominal pain, blood in stool, constipation and nausea. Endocrine: Negative for cold intolerance. Genitourinary:  Negative for dysuria, menstrual problem and urgency. Musculoskeletal:  Negative for joint swelling and myalgias. Skin:  Negative for color change, pallor and rash. Neurological:  Negative for speech difficulty, weakness, light-headedness and headaches. Hematological:  Negative for adenopathy. Psychiatric/Behavioral:  Negative for agitation, confusion, hallucinations, self-injury and suicidal ideas. The patient is not nervous/anxious. Objective   Physical Exam  Constitutional:       Appearance: Normal appearance. HENT:      Head: Normocephalic and atraumatic. Nose: Nose normal.   Eyes:      Extraocular Movements: Extraocular movements intact. Conjunctiva/sclera: Conjunctivae normal.      Pupils: Pupils are equal, round, and reactive to light. Cardiovascular:      Rate and Rhythm: Normal rate and regular rhythm. Pulses: Normal pulses. Heart sounds: Normal heart sounds. Pulmonary:      Effort: Pulmonary effort is normal.      Breath sounds: Normal breath sounds. Abdominal:      General: Abdomen is flat. Bowel sounds are normal.      Palpations: Abdomen is soft. Skin:     General: Skin is warm and dry. Neurological:      General: No focal deficit present. Mental Status: She is alert and oriented to person, place, and time. Psychiatric:         Mood and Affect: Mood normal.                 An electronic signature was used to authenticate this note.     --Red Spence MD

## 2022-10-28 ENCOUNTER — TELEPHONE (OUTPATIENT)
Dept: FAMILY MEDICINE CLINIC | Facility: CLINIC | Age: 67
End: 2022-10-28

## 2022-10-28 NOTE — TELEPHONE ENCOUNTER
----- Message from Melissa Remy, [address] [place] sent at 10/28/2022  9:27 AM EDT -----  Subject: Referral Request    Reason for referral request? Lab work (following [address] [place] [address] lab results 10/18)  Provider patient wants to be referred to(if known): Damon Rosales. Provider Phone Number(if known): Additional Information for Provider? Pt states she was called and advised   by [facility] that she is needing to come in for blood wok next month and pt   was tryin to get it scheduled, but there were not active orders in her   chart. Please place orders and the follow up with pt once placed so she   can rescheduled to come in and have tech draw blood accordingly.  Pt can be   reached @ number below, thank you so much!  ---------------------------------------------------------------------------  --------------  [facility]    [phone]; OK to leave message on voicemail  ---------------------------------------------------------------------------  --------------

## 2022-10-28 NOTE — TELEPHONE ENCOUNTER
----- Message from [place], [place] sent at 10/24/2022  5:03 PM EDT -----  Regarding: lab appointment  Finally talked to her can you call her and get her set up with a lab appointment she has been trying to call and can't get any where.  ----- Message -----  From: Jair Harry MD  Sent: 10/19/2022   7:59 AM EDT  To: [place], [place]    Vitamin d lower 22--MAKE SURE TAKING VITAMIN D ---HGB LOWER 10.3-----CREATININE  WORSE 1.20------------------------REPEAT HGB CREATININE IN ONE MONTH.----she needs office visit in one month to discuss this as her hgb has been dropping gradually over the last year.     ALSO TOTAL PROTEIN IS LOW

## 2022-11-03 DIAGNOSIS — D53.8 OTHER SPECIFIED NUTRITIONAL ANEMIAS: Primary | ICD-10-CM

## 2022-11-03 DIAGNOSIS — N28.9 RENAL INSUFFICIENCY: ICD-10-CM

## 2022-11-18 ENCOUNTER — OFFICE VISIT (OUTPATIENT)
Dept: ENDOCRINOLOGY | Age: 67
End: 2022-11-18
Payer: MEDICARE

## 2022-11-18 VITALS
HEART RATE: 61 BPM | SYSTOLIC BLOOD PRESSURE: 142 MMHG | OXYGEN SATURATION: 93 % | BODY MASS INDEX: 34.48 KG/M2 | WEIGHT: 204 LBS | DIASTOLIC BLOOD PRESSURE: 70 MMHG

## 2022-11-18 DIAGNOSIS — E03.9 PRIMARY HYPOTHYROIDISM: Primary | ICD-10-CM

## 2022-11-18 DIAGNOSIS — E04.2 MULTIPLE THYROID NODULES: ICD-10-CM

## 2022-11-18 PROCEDURE — 3078F DIAST BP <80 MM HG: CPT | Performed by: INTERNAL MEDICINE

## 2022-11-18 PROCEDURE — 76536 US EXAM OF HEAD AND NECK: CPT | Performed by: INTERNAL MEDICINE

## 2022-11-18 PROCEDURE — 99214 OFFICE O/P EST MOD 30 MIN: CPT | Performed by: INTERNAL MEDICINE

## 2022-11-18 PROCEDURE — 1123F ACP DISCUSS/DSCN MKR DOCD: CPT | Performed by: INTERNAL MEDICINE

## 2022-11-18 PROCEDURE — 3074F SYST BP LT 130 MM HG: CPT | Performed by: INTERNAL MEDICINE

## 2022-11-18 RX ORDER — IPRATROPIUM/ALBUTEROL SULFATE 20-100 MCG
MIST INHALER (GRAM) INHALATION
COMMUNITY
Start: 2022-11-10

## 2022-11-18 RX ORDER — LEVOTHYROXINE SODIUM 88 UG/1
88 TABLET ORAL
Qty: 90 TABLET | Refills: 3 | Status: SHIPPED | OUTPATIENT
Start: 2022-11-18

## 2022-11-18 ASSESSMENT — ENCOUNTER SYMPTOMS
DIARRHEA: 0
CONSTIPATION: 1
ROS SKIN COMMENTS: DENIES HAIR LOSS.  SHE REPORTS DRY SKIN.

## 2022-11-18 NOTE — PROGRESS NOTES
Keyon Pinto MD, [facility]  [address], [address] [facility], [address] [place] [address]  Phone [phone]  Facsimile [phone]          Pablito Hobson is a 78 y.o. female seen 11/18/2022 for follow-up of multiple thyroid nodules and hypothyroidism         ASSESSMENT AND PLAN:    1. Primary hypothyroidism  She is biochemically euthyroid on a stable dose of levothyroxine. At this point I will release her to the care of her primary care physician. - levothyroxine (SYNTHROID) 88 mcg tablet; Take 1 Tab by mouth Daily (before breakfast). Dispense: 90 Tab; Refill: 3     2. Multiple thyroid nodules  Thyroid ultrasound performed today again revealed the presence of multiple subcentimeter nodules without suspicious features. At this point no further imaging is required per ACR TI-RADS guidelines. Procedures:    Thyroid ultrasound 11/18/2022: Right lobe 1.64 x 1.36 x 3.25 cm, markedly heterogeneous echotexture. At the junction of the isthmus and right lobe there is a complex nodule measuring 0.44 x 0.48 x 0.62 cm containing no microcalcifications (TR 2). At the junction of the isthmus there is a solid hyperechoic nodule measuring 0.26 x 0.44 x 0.40 cm containing no microcalcifications (TR 3). Isthmus measures 0.34 cm. Left lobe 1.40 x 1.19 x 3.11 cm, markedly heterogeneous echotexture. In the inferior left lobe there is a complex nodule measuring 0.39 x 0.59 x 0.66 cm containing no microcalcifications (TR 2). Follow-up and Dispositions    Return As needed. HISTORY OF PRESENT ILLNESS:    THYROID NODULE / MULTINODULAR GOITER    Presentation: Incidentally noted on carotid Doppler. Thyroid Cancer Risk Factors: There is no family history of thyroid cancer. There is no history of radiation to the head/neck. Symptoms: Denies anterior neck enlargement/pain/pressure. She still reports difficulty swallowing.   She has a history of GERD and Alicia's esophagus. She has had multiple esophageal dilatations in the past.  She reports hoarseness. Denies positional shortness of breath. Imaging:  Thyroid ultrasound 9/1/2015: Right lobe 3.3 x 1.4 x 1.2 cm. There is a complex nodule at the junction of the isthmus and right lobe measuring 1.4 cm. Isthmus measures 0.4 cm. Left lobe 3.6 x 1.1 x 1.3 cm. In the inferior left lobe there is a relatively echogenic solid nodule measuring 1 cm with no microcalcifications. Limited thyroid ultrasound 10/6/2015: There is a complex nodule at the junction of the isthmus and right lobe measuring 0.55 x 0.82 x 1.17 cm (it is approximately 60% solid and 40% cystic). It contains minimal internal blood flow and no microcalcifications. There is a complex nodule in the inferior left lobe measuring 0.54 x 0.67 x 0.94 cm. Thyroid ultrasound 4/5/2016: Right lobe 1.50 x 1.36 x 3.51 cm, hypoechoic and heterogeneous echotexture. At the junction of the isthmus and the right lobe there is a complex nodule measuring 0.63 x 0.86 x 1.38 cm containing minimal internal blood flow and no microcalcifications. Isthmus measures 0.54 cm. Left lobe 1.29 x 1.16 x 2.83 cm, hypoechoic and heterogeneous echotexture. In the inferior left lobe there is a complex nodule measuring 0.63 x 0.89 cm. Thyroid ultrasound 10/16/2017: Right lobe 1.63 x 1.31 x 3.15 cm, hypoechoic and markedly heterogeneous echotexture. At the junction of the isthmus and right lobe is a complex nodule measuring 0.54 x 0.93 x 1.01 cm containing minimal internal blood flow and no microcalcifications. Isthmus measures 0.56 cm. Left lobe 1.38 x 1.36 x 3.52 cm, hypoechoic and heterogeneous echotexture. In the inferior left lobe there is a possible complex nodule measuring 0.41 x 0.70 cm. Thyroid ultrasound 11/18/2019: Right lobe 1.44 x 1.24 x 3.29 cm, markedly heterogeneous echotexture.   At the junction of the isthmus and right lobe there is a complex nodule measuring 0.55 x 0.76 x 0.87 cm containing no microcalcifications. Isthmus measures 0.39 cm. Left lobe 1.20 x 1.02 x 3.16 cm, markedly heterogeneous echotexture. In the inferior left lobe there is a possible complex nodule measuring 0.31 x 0.63 cm containing no microcalcifications. Thyroid ultrasound 11/18/2022: Right lobe 1.64 x 1.36 x 3.25 cm, markedly heterogeneous echotexture. At the junction of the isthmus and right lobe there is a complex nodule measuring 0.44 x 0.48 x 0.62 cm containing no microcalcifications (TR 2). At the junction of the isthmus there is a solid hyperechoic nodule measuring 0.26 x 0.44 x 0.40 cm containing no microcalcifications (TR 3). Isthmus measures 0.34 cm. Left lobe 1.40 x 1.19 x 3.11 cm, markedly heterogeneous echotexture. In the inferior left lobe there is a complex nodule measuring 0.39 x 0.59 x 0.66 cm containing no microcalcifications (TR 2). THYROID DISEASE    Presentation/Diagnosis: Hypothyroidism diagnosed 2006. Symptoms: See review of systems. Treatment: Takes generic in AM correctly. Labs:  10/6/2015: TSH 1.061.   4/5/2016: TSH 0.607.  7/26/2017: TSH 0.441.  12/28/2017: TSH 0.394, free T4 1.02.  5/21/2018: TSH 0.112, free T4 1.40.  6/29/2018: TSH 0.197, free T4 1.09.  10/16/2018: TSH 0.179, free T4 1.54.  10/23/2018: TSH 0.243.  3/4/2019: TSH 1.372, free T4 1. 11.  7/15/2019: TSH 1.128.  1/15/2020: TSH 0.539.  11/19/2020: TSH 1.300.   11/19/2021: TSH 1.53.  10/18/2022: TSH 0.756. Review of Systems   Constitutional:  Positive for fatigue. Negative for diaphoresis. Weight decreased 3 pounds since last visit. Cardiovascular:  Negative for palpitations. Gastrointestinal:  Positive for constipation. Negative for diarrhea. Endocrine: Negative for cold intolerance and heat intolerance. Skin:         Denies hair loss. She reports dry skin. Neurological:  Negative for tremors. Psychiatric/Behavioral:  Positive for sleep disturbance.       Vital Signs: BP (!) 142/70   Pulse 61   Wt 204 lb (92.5 kg)   SpO2 93%   BMI 34.48 kg/m²     Wt Readings from Last 3 Encounters:   11/18/22 204 lb (92.5 kg)   10/18/22 203 lb 6.4 oz (92.3 kg)   08/12/22 197 lb (89.4 kg)       Physical Exam  Constitutional:       General: She is not in acute distress. Neck:      Thyroid: No thyroid mass or thyromegaly. Cardiovascular:      Rate and Rhythm: Normal rate and regular rhythm. Lymphadenopathy:      Cervical: No cervical adenopathy. Neurological:      Motor: No tremor. No orders of the defined types were placed in this encounter. Current Outpatient Medications   Medication Sig Dispense Refill    COMBIVENT RESPIMAT  MCG/ACT AERS inhaler INHALE 2 PUFFS EVERY 6 (SIX) HOURS AS NEEDED (WHEEZING, SHORTNESS OF BREATH)      zolpidem (AMBIEN) 10 MG tablet TAKE 1 TABLET BY MOUTH NIGHTLY AS NEEDED FOR SLEEP FOR UP TO 30 DAYS. MAX DAILY AMOUNT: 10 MG. 90 tablet 0    Fluticasone-Umeclidin-Vilant (TRELEGY ELLIPTA) 200-62.5-25 MCG/INH AEPB INHALE 1 PUFF DAILY      levothyroxine (SYNTHROID) 88 MCG tablet Take 88 mcg by mouth every morning (before breakfast)      magnesium oxide (MAG-OX) 400 MG tablet Take 400 mg by mouth daily      nitroGLYCERIN (NITROSTAT) 0.4 MG SL tablet Place 0.4 mg under the tongue      oxyCODONE (OXY-IR) 15 MG immediate release tablet TAKE 1 TABLET EVERY 6 HOURS      Roflumilast (DALIRESP) 500 MCG tablet Take 500 mcg by mouth daily      spironolactone-hydroCHLOROthiazide (ALDACTAZIDE) 25-25 MG per tablet Take 1 tablet by mouth daily      atorvastatin (LIPITOR) 40 MG tablet Take 1 tablet by mouth in the morning. 90 tablet 3    nebivolol (BYSTOLIC) 10 MG tablet Take 1 tablet by mouth in the morning. 90 tablet 0    amLODIPine (NORVASC) 5 MG tablet Take 1 tablet by mouth in the morning. 90 tablet 3     No current facility-administered medications for this visit.

## 2022-11-22 ENCOUNTER — NURSE ONLY (OUTPATIENT)
Dept: FAMILY MEDICINE CLINIC | Facility: CLINIC | Age: 67
End: 2022-11-22

## 2022-11-22 DIAGNOSIS — N28.9 RENAL INSUFFICIENCY: ICD-10-CM

## 2022-11-22 DIAGNOSIS — D53.8 OTHER SPECIFIED NUTRITIONAL ANEMIAS: ICD-10-CM

## 2022-11-22 LAB
BASOPHILS # BLD: 0 K/UL (ref 0–0.2)
BASOPHILS NFR BLD: 0 % (ref 0–2)
CREAT SERPL-MCNC: 1.2 MG/DL (ref 0.6–1)
DIFFERENTIAL METHOD BLD: ABNORMAL
EOSINOPHIL # BLD: 0.2 K/UL (ref 0–0.8)
EOSINOPHIL NFR BLD: 2 % (ref 0.5–7.8)
ERYTHROCYTE [DISTWIDTH] IN BLOOD BY AUTOMATED COUNT: 14.6 % (ref 11.9–14.6)
FOLATE SERPL-MCNC: 14.2 NG/ML (ref 3.1–17.5)
HCT VFR BLD AUTO: 36 % (ref 35.8–46.3)
HGB BLD-MCNC: 11.5 G/DL (ref 11.7–15.4)
IMM GRANULOCYTES # BLD AUTO: 0 K/UL (ref 0–0.5)
IMM GRANULOCYTES NFR BLD AUTO: 0 % (ref 0–5)
LYMPHOCYTES # BLD: 1.5 K/UL (ref 0.5–4.6)
LYMPHOCYTES NFR BLD: 21 % (ref 13–44)
MCH RBC QN AUTO: 29 PG (ref 26.1–32.9)
MCHC RBC AUTO-ENTMCNC: 31.9 G/DL (ref 31.4–35)
MCV RBC AUTO: 90.7 FL (ref 82–102)
MONOCYTES # BLD: 0.5 K/UL (ref 0.1–1.3)
MONOCYTES NFR BLD: 7 % (ref 4–12)
NEUTS SEG # BLD: 5.1 K/UL (ref 1.7–8.2)
NEUTS SEG NFR BLD: 70 % (ref 43–78)
NRBC # BLD: 0 K/UL (ref 0–0.2)
PLATELET # BLD AUTO: 244 K/UL (ref 150–450)
PMV BLD AUTO: 10.8 FL (ref 9.4–12.3)
RBC # BLD AUTO: 3.97 M/UL (ref 4.05–5.2)
VIT B12 SERPL-MCNC: 358 PG/ML (ref 193–986)
WBC # BLD AUTO: 7.2 K/UL (ref 4.3–11.1)

## 2023-01-19 ENCOUNTER — OFFICE VISIT (OUTPATIENT)
Dept: FAMILY MEDICINE CLINIC | Facility: CLINIC | Age: 68
End: 2023-01-19
Payer: MEDICARE

## 2023-01-19 VITALS
DIASTOLIC BLOOD PRESSURE: 66 MMHG | BODY MASS INDEX: 34.62 KG/M2 | HEART RATE: 77 BPM | WEIGHT: 207.8 LBS | OXYGEN SATURATION: 96 % | HEIGHT: 65 IN | SYSTOLIC BLOOD PRESSURE: 122 MMHG | TEMPERATURE: 97.9 F

## 2023-01-19 DIAGNOSIS — I25.10 CORONARY ARTERY DISEASE DUE TO LIPID RICH PLAQUE: ICD-10-CM

## 2023-01-19 DIAGNOSIS — E78.2 MIXED HYPERLIPIDEMIA: ICD-10-CM

## 2023-01-19 DIAGNOSIS — G47.01 INSOMNIA DUE TO MEDICAL CONDITION: ICD-10-CM

## 2023-01-19 DIAGNOSIS — D50.8 IRON DEFICIENCY ANEMIA SECONDARY TO INADEQUATE DIETARY IRON INTAKE: Primary | ICD-10-CM

## 2023-01-19 DIAGNOSIS — J44.9 CHRONIC OBSTRUCTIVE PULMONARY DISEASE, UNSPECIFIED COPD TYPE (HCC): ICD-10-CM

## 2023-01-19 DIAGNOSIS — E55.9 VITAMIN D DEFICIENCY: ICD-10-CM

## 2023-01-19 DIAGNOSIS — I10 PRIMARY HYPERTENSION: ICD-10-CM

## 2023-01-19 DIAGNOSIS — R06.02 SHORTNESS OF BREATH: ICD-10-CM

## 2023-01-19 DIAGNOSIS — I25.83 CORONARY ARTERY DISEASE DUE TO LIPID RICH PLAQUE: ICD-10-CM

## 2023-01-19 DIAGNOSIS — E03.8 OTHER SPECIFIED HYPOTHYROIDISM: ICD-10-CM

## 2023-01-19 PROCEDURE — G8417 CALC BMI ABV UP PARAM F/U: HCPCS | Performed by: FAMILY MEDICINE

## 2023-01-19 PROCEDURE — 93000 ELECTROCARDIOGRAM COMPLETE: CPT | Performed by: FAMILY MEDICINE

## 2023-01-19 PROCEDURE — 1036F TOBACCO NON-USER: CPT | Performed by: FAMILY MEDICINE

## 2023-01-19 PROCEDURE — G8427 DOCREV CUR MEDS BY ELIG CLIN: HCPCS | Performed by: FAMILY MEDICINE

## 2023-01-19 PROCEDURE — 99214 OFFICE O/P EST MOD 30 MIN: CPT | Performed by: FAMILY MEDICINE

## 2023-01-19 PROCEDURE — 1090F PRES/ABSN URINE INCON ASSESS: CPT | Performed by: FAMILY MEDICINE

## 2023-01-19 PROCEDURE — G8399 PT W/DXA RESULTS DOCUMENT: HCPCS | Performed by: FAMILY MEDICINE

## 2023-01-19 PROCEDURE — 3017F COLORECTAL CA SCREEN DOC REV: CPT | Performed by: FAMILY MEDICINE

## 2023-01-19 PROCEDURE — 3074F SYST BP LT 130 MM HG: CPT | Performed by: FAMILY MEDICINE

## 2023-01-19 PROCEDURE — 1123F ACP DISCUSS/DSCN MKR DOCD: CPT | Performed by: FAMILY MEDICINE

## 2023-01-19 PROCEDURE — 3078F DIAST BP <80 MM HG: CPT | Performed by: FAMILY MEDICINE

## 2023-01-19 PROCEDURE — 3023F SPIROM DOC REV: CPT | Performed by: FAMILY MEDICINE

## 2023-01-19 PROCEDURE — G8484 FLU IMMUNIZE NO ADMIN: HCPCS | Performed by: FAMILY MEDICINE

## 2023-01-19 RX ORDER — ZOLPIDEM TARTRATE 10 MG/1
10 TABLET ORAL NIGHTLY
Qty: 30 TABLET | Refills: 2 | Status: SHIPPED | OUTPATIENT
Start: 2023-01-19 | End: 2023-04-19

## 2023-01-19 RX ORDER — PROMETHAZINE HYDROCHLORIDE 25 MG/1
TABLET ORAL
COMMUNITY
Start: 2023-01-13

## 2023-01-19 RX ORDER — ZOLPIDEM TARTRATE 10 MG/1
10 TABLET ORAL NIGHTLY
COMMUNITY
End: 2023-01-19 | Stop reason: SDUPTHER

## 2023-01-19 ASSESSMENT — PATIENT HEALTH QUESTIONNAIRE - PHQ9
1. LITTLE INTEREST OR PLEASURE IN DOING THINGS: 0
SUM OF ALL RESPONSES TO PHQ QUESTIONS 1-9: 0
SUM OF ALL RESPONSES TO PHQ9 QUESTIONS 1 & 2: 0
2. FEELING DOWN, DEPRESSED OR HOPELESS: 0
SUM OF ALL RESPONSES TO PHQ QUESTIONS 1-9: 0

## 2023-01-20 ASSESSMENT — ENCOUNTER SYMPTOMS
BLOOD IN STOOL: 0
DIFFICULTY BREATHING: 0
CHEST TIGHTNESS: 0
NAUSEA: 0
COUGH: 0
SORE THROAT: 0
BLURRED VISION: 0
WHEEZING: 0
VOMITING: 0
FREQUENT THROAT CLEARING: 0
DIARRHEA: 0
EYE PAIN: 0
ABDOMINAL DISTENTION: 0
SHORTNESS OF BREATH: 1
COLOR CHANGE: 0
PHOTOPHOBIA: 0
ORTHOPNEA: 0
ABDOMINAL PAIN: 0

## 2023-01-20 ASSESSMENT — COPD QUESTIONNAIRES: COPD: 1

## 2023-01-21 NOTE — PROGRESS NOTES
Johan Rees  1955 is a 78 y.o. female ,Established patient, here for evaluation of the following chief complaint(s):   Chief Complaint   Patient presents with    3 Month Follow-Up     Pt is not fasting-    Medication Refill          1. Iron deficiency anemia secondary to inadequate dietary iron intake  -     CBC with Auto Differential; Future  -     Iron; Future  -     Ferritin; Future  2. Insomnia due to medical condition  -     zolpidem (AMBIEN) 10 MG tablet; Take 1 tablet by mouth at bedtime for 90 days. Max Daily Amount: 10 mg, Disp-30 tablet, R-2Normal  3. Primary hypertension  -     CBC with Auto Differential; Future  -     Comprehensive Metabolic Panel; Future  4. Mixed hyperlipidemia  -     Lipid Panel; Future  5. Vitamin D deficiency  -     Vitamin D 25 Hydroxy; Future  6. Other specified hypothyroidism  -     TSH; Future  7. Shortness of breath  -     D-Dimer, Quantitative; Future  -     EKG 12 Lead--sinus rhythm within normal limits ; no acute change  -     External Referral to Pulmonology  -     XR CHEST PA LAT (2 VIEWS); Future  -     Ham Jordan MD, Cardiology, [place]  8. Coronary artery disease due to lipid rich plaque--she has appt in July --will try to get in a little earlier.  -     EKG 12 Lead  -     Ham Jordan MD, Cardiology, [place]  9. Chronic obstructive pulmonary disease, unspecified [facility]  -     External Referral to Pulmonology  -     XR CHEST PA LAT (2 VIEWS); Future        Return in about 2 weeks (around 2/2/2023). Subjective   SUBJECTIVE/OBJECTIVE:  Medication Refill  This is a chronic problem. The current episode started more than 1 year ago. The problem occurs daily. The problem has been unchanged. Pertinent negatives include no abdominal pain, chest pain, chills, coughing, fever, headaches, joint swelling, myalgias, nausea, rash, sore throat, vomiting or weakness. Hypertension  This is a chronic problem.  The current episode started more than 1 year ago. The problem has been gradually improving since onset. The problem is controlled. Associated symptoms include shortness of breath. Pertinent negatives include no anxiety, blurred vision, chest pain, headaches, orthopnea, palpitations, peripheral edema, PND or sweats. Identifiable causes of hypertension include a thyroid problem. COPD  She complains of shortness of breath. There is no chest tightness, cough, difficulty breathing, frequent throat clearing or wheezing. Primary symptoms comments: She has noticed some increased trouble breathing. . This is a chronic problem. The problem occurs every several days. The problem has been gradually improving. Pertinent negatives include no chest pain, ear pain, fever, headaches, myalgias, PND, sore throat or sweats. Her past medical history is significant for COPD. Thyroid Problem  Presents for follow-up visit. Patient reports no cold intolerance, depressed mood, diarrhea or palpitations. Shortness of Breath  This is a chronic problem. The current episode started more than 1 year ago. The problem occurs daily. The problem has been gradually worsening. Pertinent negatives include no abdominal pain, chest pain, claudication, ear pain, fever, headaches, leg swelling, orthopnea, PND, rash, sore throat, syncope, vomiting or wheezing. Her past medical history is significant for COPD. Review of Systems   Constitutional:  Negative for chills and fever. HENT:  Negative for ear pain, hearing loss and sore throat. Eyes:  Negative for blurred vision, photophobia and pain. Respiratory:  Positive for shortness of breath. Negative for cough and wheezing. Cardiovascular:  Negative for chest pain, palpitations, orthopnea, claudication, leg swelling, syncope and PND. Gastrointestinal:  Negative for abdominal distention, abdominal pain, blood in stool, diarrhea, nausea and vomiting. Endocrine: Negative for cold intolerance.    Genitourinary: Negative for dysuria and urgency. Musculoskeletal:  Negative for joint swelling and myalgias. Skin:  Negative for color change, pallor and rash. Neurological:  Negative for speech difficulty, weakness, light-headedness and headaches. Hematological:  Negative for adenopathy. Psychiatric/Behavioral:  Negative for agitation, confusion, hallucinations, self-injury and suicidal ideas. Objective   Physical Exam  Constitutional:       Appearance: Normal appearance. HENT:      Head: Normocephalic and atraumatic. Nose: Nose normal.   Eyes:      Extraocular Movements: Extraocular movements intact. Conjunctiva/sclera: Conjunctivae normal.      Pupils: Pupils are equal, round, and reactive to light. Cardiovascular:      Rate and Rhythm: Normal rate and regular rhythm. Pulses: Normal pulses. Heart sounds: Normal heart sounds. Pulmonary:      Effort: Pulmonary effort is normal.      Breath sounds: Normal breath sounds. Abdominal:      General: Abdomen is flat. Bowel sounds are normal.      Palpations: Abdomen is soft. Skin:     General: Skin is warm and dry. Neurological:      General: No focal deficit present. Mental Status: She is alert and oriented to person, place, and time. Psychiatric:         Mood and Affect: Mood normal.                 An electronic signature was used to authenticate this note.     --Tello Houser MD

## 2023-02-08 ENCOUNTER — HOSPITAL ENCOUNTER (OUTPATIENT)
Dept: GENERAL RADIOLOGY | Age: 68
Discharge: HOME | End: 2023-02-11
Payer: MEDICARE

## 2023-02-08 DIAGNOSIS — J44.9 CHRONIC OBSTRUCTIVE PULMONARY DISEASE, UNSPECIFIED COPD TYPE (HCC): ICD-10-CM

## 2023-02-08 DIAGNOSIS — R06.02 SHORTNESS OF BREATH: ICD-10-CM

## 2023-02-08 PROCEDURE — 71046 X-RAY EXAM CHEST 2 VIEWS: CPT

## 2023-02-09 ENCOUNTER — OFFICE VISIT (OUTPATIENT)
Dept: CARDIOLOGY CLINIC | Age: 68
End: 2023-02-09
Payer: MEDICARE

## 2023-02-09 VITALS
HEART RATE: 65 BPM | WEIGHT: 212 LBS | HEIGHT: 65 IN | BODY MASS INDEX: 35.32 KG/M2 | SYSTOLIC BLOOD PRESSURE: 139 MMHG | DIASTOLIC BLOOD PRESSURE: 65 MMHG

## 2023-02-09 DIAGNOSIS — N18.31 STAGE 3A CHRONIC KIDNEY DISEASE (HCC): ICD-10-CM

## 2023-02-09 DIAGNOSIS — I77.9 CAROTID ARTERY DISEASE, UNSPECIFIED LATERALITY (HCC): ICD-10-CM

## 2023-02-09 DIAGNOSIS — I10 HTN (HYPERTENSION), BENIGN: Primary | ICD-10-CM

## 2023-02-09 DIAGNOSIS — E74.39 GLUCOSE INTOLERANCE: ICD-10-CM

## 2023-02-09 DIAGNOSIS — E78.2 MIXED HYPERLIPIDEMIA: ICD-10-CM

## 2023-02-09 DIAGNOSIS — Z91.89 AT RISK FOR OBSTRUCTIVE SLEEP APNEA: ICD-10-CM

## 2023-02-09 DIAGNOSIS — I25.118 ATHEROSCLEROTIC HEART DISEASE OF NATIVE CORONARY ARTERY WITH OTHER FORMS OF ANGINA PECTORIS (HCC): ICD-10-CM

## 2023-02-09 PROCEDURE — 1090F PRES/ABSN URINE INCON ASSESS: CPT | Performed by: INTERNAL MEDICINE

## 2023-02-09 PROCEDURE — 1036F TOBACCO NON-USER: CPT | Performed by: INTERNAL MEDICINE

## 2023-02-09 PROCEDURE — G8399 PT W/DXA RESULTS DOCUMENT: HCPCS | Performed by: INTERNAL MEDICINE

## 2023-02-09 PROCEDURE — G8484 FLU IMMUNIZE NO ADMIN: HCPCS | Performed by: INTERNAL MEDICINE

## 2023-02-09 PROCEDURE — 99214 OFFICE O/P EST MOD 30 MIN: CPT | Performed by: INTERNAL MEDICINE

## 2023-02-09 PROCEDURE — 3078F DIAST BP <80 MM HG: CPT | Performed by: INTERNAL MEDICINE

## 2023-02-09 PROCEDURE — 3075F SYST BP GE 130 - 139MM HG: CPT | Performed by: INTERNAL MEDICINE

## 2023-02-09 PROCEDURE — 3017F COLORECTAL CA SCREEN DOC REV: CPT | Performed by: INTERNAL MEDICINE

## 2023-02-09 PROCEDURE — G8417 CALC BMI ABV UP PARAM F/U: HCPCS | Performed by: INTERNAL MEDICINE

## 2023-02-09 PROCEDURE — 1123F ACP DISCUSS/DSCN MKR DOCD: CPT | Performed by: INTERNAL MEDICINE

## 2023-02-09 PROCEDURE — G8427 DOCREV CUR MEDS BY ELIG CLIN: HCPCS | Performed by: INTERNAL MEDICINE

## 2023-02-09 RX ORDER — SPIRONOLACTONE 25 MG/1
25 TABLET ORAL DAILY
Qty: 90 TABLET | Refills: 3 | Status: SHIPPED | OUTPATIENT
Start: 2023-02-09

## 2023-02-09 RX ORDER — NEBIVOLOL 10 MG/1
10 TABLET ORAL DAILY
Qty: 90 TABLET | Refills: 3 | Status: SHIPPED | OUTPATIENT
Start: 2023-02-09 | End: 2023-05-10

## 2023-02-09 RX ORDER — SPIRONOLACTONE AND HYDROCHLOROTHIAZIDE 25; 25 MG/1; MG/1
1 TABLET ORAL DAILY
Qty: 90 TABLET | Refills: 3 | Status: SHIPPED | OUTPATIENT
Start: 2023-02-09 | End: 2023-02-09 | Stop reason: ALTCHOICE

## 2023-02-09 RX ORDER — NITROGLYCERIN 0.4 MG/1
0.4 TABLET SUBLINGUAL EVERY 5 MIN PRN
Qty: 25 TABLET | Refills: 1 | Status: SHIPPED | OUTPATIENT
Start: 2023-02-09

## 2023-02-09 RX ORDER — AMLODIPINE BESYLATE 5 MG/1
5 TABLET ORAL DAILY
Qty: 90 TABLET | Refills: 3 | Status: SHIPPED | OUTPATIENT
Start: 2023-02-09 | End: 2023-05-10

## 2023-02-09 RX ORDER — ATORVASTATIN CALCIUM 40 MG/1
40 TABLET, FILM COATED ORAL DAILY
Qty: 90 TABLET | Refills: 3 | Status: SHIPPED | OUTPATIENT
Start: 2023-02-09 | End: 2023-05-10

## 2023-02-09 NOTE — PROGRESS NOTES
[address] [place]  [address], [address] [facility], [address] [place] [address]  PHONE: [phone]    SUBJECTIVE:   Ayesha Wen is a 80 y.o. female 1955   seen for a follow up visit regarding the following:     Chief Complaint   Patient presents with    Coronary Artery Disease    Hyperlipidemia    Hypertension         History of present illness: 80 y.o. female presented for follow-up 2/9/23 sleeping with trilogy at night sleep apnea study was not scheduled. Patient with glucose intolerance stage IIIa chronic kidney disease last labs reviewed. Interested in weight loss as well. Symptoms are well controlled          Interval Hx    Patient is very concerned with CAD due to family with history of MI. Back surgery pain no better. Tearful today. Pain. The patient had a previous history of hypertension, as well as nonobstructive coronary artery disease. She presented off schedule 04/27/2018. Unfortunately, she had an episode of angioedema that occurred associated with ACE inhibitor therapy. The patient  was seen in [facility]. She was given steroids, as well as Benadryl with resolution. Her Lisinopril was discontinued. She presented 11/30/2018 scheduled back surgery possibly due to bulging disc. Cardiac history:    Cardiac catheterization was in 2010 at [facility]. 2015 with stress perfusion imaging that was normal.    Cardiac catheterization 5/11/2016 mild nonobstructive coronary disease with moderate diffuse calcifications noted. left main angiographically normal, LAD mild nonobstructive disease with focal stenosis at the origin in the first diagonal branch, 20%  narrowing at the circumflex, small low grade calcification luminal irregularities, right coronary and large PDA with 30% stenosis. Ranexa initiated 12/2016 with improvement of some symptoms patient discontinued. CT abdomen with normal adrenal glands.     12/2017 [facility] total cholesterol 116, triglycerides 70, HDL 50, LDL 52, VLDL 14.    04/2018 angioedema on ACE inhibitor.    04/27/2018 Metoprolol was changed to Bystolic.  Initiated on Aldactazide, on Amlodipine 5 mg.      LAbs 10/23 [facility] potassium 3.2 creatine 1.2    10/4/2019 EKG siuns rhythm    10/2019 NST normal    4/2021 Carotids <50% stenosis    1/10/2022 sinus rhythm, normal rate, normal [place] intervals, ST wave normal, normal axis,      Assessment and Plan:   Stop Aldactazide initiate SGL 2 Farxiga due to CKD,, glucose intolerance.  Patient given drug assistance information    At risk for MUSHTAQ   Risk factors for obstructive sleep apnea  At her last appointment we discussed referral for home sleep study this was not completed  Revisited today  The patient endorses a history of morning headaches, daytime fatigue, daytime somnolence, witnessed apnea, witnessed snoring.   CAD    Sx controlled    Hypertension,  History of angioedema on ACE inhibitor  Unconventional regimen but has been successful in controlling blood pressure readings.  Continue medications at doses listed below  Key CAD CHF Meds            atorvastatin (LIPITOR) 40 MG tablet (Taking)    Sig - Route: Take 1 tablet by mouth daily - Oral    nebivolol (BYSTOLIC) 10 MG tablet (Taking)    Sig - Route: Take 1 tablet by mouth daily - Oral    amLODIPine (NORVASC) 5 MG tablet (Taking)    Sig - Route: Take 1 tablet by mouth daily - Oral    spironolactone (ALDACTONE) 25 MG tablet (Taking)    Sig - Route: Take 1 tablet by mouth daily - Oral               CKD  Estimated Creatinine Clearance: 52 mL/min (A) (based on SCr of 1.2 mg/dL (H)).   Controlled   CT abdomen 2017 no adrenal abnormalities noted.    Cautioned on NSAID use controlled  Stop hydrochlorothiazide initiate Farxiga  BMP planned at PCPs office in early March.      HPL  Lipid studies reviewed 10/18/2022 total cholesterol 109 HDL 51 LDL 35.4 [at goal]  Sinew Lipitor 40 mg daily    Carotid  disease last checked 2017  Mild carotid disease by ultrasound 2020. Consider rechecking 2025 [5-year interval]               Current Outpatient Medications   Medication Sig    atorvastatin (LIPITOR) 40 MG tablet Take 1 tablet by mouth daily    nebivolol (BYSTOLIC) 10 MG tablet Take 1 tablet by mouth daily    amLODIPine (NORVASC) 5 MG tablet Take 1 tablet by mouth daily    nitroGLYCERIN (NITROSTAT) 0.4 MG SL tablet Place 1 tablet under the tongue every 5 minutes as needed for Chest pain    dapagliflozin (FARXIGA) 10 MG tablet Take 1 tablet by mouth every morning    spironolactone (ALDACTONE) 25 MG tablet Take 1 tablet by mouth daily    promethazine (PHENERGAN) 25 MG tablet ONE TABLET EVERY 6 HOURS AS NEEDED FOR NAUSEA    zolpidem (AMBIEN) 10 MG tablet Take 1 tablet by mouth at bedtime for 90 days. Max Daily Amount: 10 mg    COMBIVENT RESPIMAT  MCG/ACT AERS inhaler INHALE 2 PUFFS EVERY 6 (SIX) HOURS AS NEEDED (WHEEZING, SHORTNESS OF BREATH)    levothyroxine (SYNTHROID) 88 MCG tablet Take 1 tablet by mouth every morning (before breakfast)    Fluticasone-Umeclidin-Vilant (TRELEGY ELLIPTA) 200-62.5-25 MCG/INH AEPB INHALE 1 PUFF DAILY    magnesium oxide (MAG-OX) 400 MG tablet Take 400 mg by mouth daily    oxyCODONE (OXY-IR) 15 MG immediate release tablet TAKE 1 TABLET EVERY 6 HOURS    Roflumilast (DALIRESP) 500 MCG tablet Take 500 mcg by mouth daily     No current facility-administered medications for this visit. Past Medical History, Past Surgical History, Family history, Social History, and Medications were all reviewed with the patient today and updated as necessary. Allergies   Allergen Reactions    Ace Inhibitors Angioedema and Swelling     LIPS, FACE AND THROAT      Codeine Itching and Other (See Comments)     DERIVATIVES  Other reaction(s): hives    Morphine Anxiety and Other (See Comments)     Other reaction(s): Rage  \" only in pill form,\" caused severe agitation.      Past Medical History: Diagnosis Date    Arthritis     Asthma     Atherosclerotic heart disease of native coronary artery with other forms of angina pectoris ([facility] 3/14/2022    COP[facility]chronic obstructive [facility] disease) [facility] 10/5/2015    GERD (gastroesophageal reflux disease)     HTN (hypertension), benign 10/5/2015    MUSHTAQ (obstructive sleep apnea) 2016    Psychiatric disorder     anxiety    Thyroid disease      Past Surgical History:   Procedure Laterality Date    APPENDECTOMY      CHOLECYSTECTOMY      HYSTERECTOMY (CERVIX STATUS UNKNOWN)      ORTHOPEDIC SURGERY      neck x4, back x 5 - LAST ONE IN     OTHER SURGICAL HISTORY      L3-L4 FUSION    ROTATOR CUFF REPAIR Right 2020    SHOULDER SURGERY Left 2022    TUBAL LIGATION       Family History   Problem Relation Age of Onset    Cancer Sister     Diabetes Sister     Breast Cancer Sister     Heart Attack Father     Breast Cancer Sister     Cancer Mother     Thyroid Disease Mother         goiter    Hypertension Father     Cancer Brother       Social History     Tobacco Use    Smoking status: Former     Packs/day: 2.00     Years: 40.00     Pack years: 80.00     Types: Cigarettes     Quit date: 2014     Years since quittin.7    Smokeless tobacco: Former   Substance Use Topics    Alcohol use: No     Alcohol/week: 0.0 standard drinks       ROS:    ROS        PHYSICAL EXAM:    /65   Pulse 65   Ht 5' 5\" (1.651 m)   Wt 212 lb (96.2 kg)   BMI 35.28 kg/m²        Wt Readings from Last 3 Encounters:   23 212 lb (96.2 kg)   23 207 lb 12.8 oz (94.3 kg)   22 204 lb (92.5 kg)     BP Readings from Last 3 Encounters:   23 139/65   23 122/66   22 (!) 142/70         Physical Exam    Medical problems and test results were reviewed with the patient today. No results found for this or any previous visit (from the past 672 hour(s)).     Lab Results   Component Value Date/Time    CHOL 109 10/18/2022 11:48 AM    HDL 51 10/18/2022 11:48 AM    VLDL 20 03/14/2022 11:11 AM              ERIN Yanez was seen today for coronary artery disease, hyperlipidemia and hypertension. Diagnoses and all orders for this visit:    HTN (hypertension), benign    Atherosclerotic heart disease of native coronary artery with other forms of angina pectoris ([facility]    Mixed hyperlipidemia    Carotid artery disease, unspecified laterality ([facility]    At risk for obstructive sleep apnea    Glucose intolerance    Stage 3a chronic kidney disease ([facility]  -     Basic Metabolic Panel; Future    Other orders  -     atorvastatin (LIPITOR) 40 MG tablet; Take 1 tablet by mouth daily  -     nebivolol (BYSTOLIC) 10 MG tablet; Take 1 tablet by mouth daily  -     amLODIPine (NORVASC) 5 MG tablet; Take 1 tablet by mouth daily  -     nitroGLYCERIN (NITROSTAT) 0.4 MG SL tablet; Place 1 tablet under the tongue every 5 minutes as needed for Chest pain  -     Discontinue: spironolactone-hydroCHLOROthiazide (ALDACTAZIDE) 25-25 MG per tablet; Take 1 tablet by mouth daily  -     dapagliflozin (FARXIGA) 10 MG tablet; Take 1 tablet by mouth every morning  -     spironolactone (ALDACTONE) 25 MG tablet; Take 1 tablet by mouth daily    Return in about 6 months (around 8/9/2023).        Don Mendoza MD  2/9/2023  1:34 PM

## 2023-03-03 ENCOUNTER — OFFICE VISIT (OUTPATIENT)
Dept: FAMILY MEDICINE CLINIC | Facility: CLINIC | Age: 68
End: 2023-03-03
Payer: MEDICARE

## 2023-03-03 VITALS
BODY MASS INDEX: 34.79 KG/M2 | WEIGHT: 208.8 LBS | HEIGHT: 65 IN | TEMPERATURE: 98 F | HEART RATE: 68 BPM | SYSTOLIC BLOOD PRESSURE: 130 MMHG | DIASTOLIC BLOOD PRESSURE: 60 MMHG | OXYGEN SATURATION: 99 %

## 2023-03-03 DIAGNOSIS — K22.70 BARRETT'S ESOPHAGUS WITHOUT DYSPLASIA: ICD-10-CM

## 2023-03-03 DIAGNOSIS — J44.9 CHRONIC OBSTRUCTIVE PULMONARY DISEASE, UNSPECIFIED COPD TYPE (HCC): ICD-10-CM

## 2023-03-03 DIAGNOSIS — R73.09 ABNORMAL GLUCOSE: ICD-10-CM

## 2023-03-03 DIAGNOSIS — E78.2 MIXED HYPERLIPIDEMIA: Primary | ICD-10-CM

## 2023-03-03 LAB
ALBUMIN SERPL-MCNC: 3.2 G/DL (ref 3.2–4.6)
ALBUMIN/GLOB SERPL: 0.9 (ref 0.4–1.6)
ALP SERPL-CCNC: 96 U/L (ref 50–136)
ALT SERPL-CCNC: 24 U/L (ref 12–65)
ANION GAP SERPL CALC-SCNC: 3 MMOL/L (ref 2–11)
AST SERPL-CCNC: 18 U/L (ref 15–37)
BASOPHILS # BLD: 0 K/UL (ref 0–0.2)
BASOPHILS NFR BLD: 1 % (ref 0–2)
BILIRUB SERPL-MCNC: 0.7 MG/DL (ref 0.2–1.1)
BUN SERPL-MCNC: 17 MG/DL (ref 8–23)
CALCIUM SERPL-MCNC: 9.2 MG/DL (ref 8.3–10.4)
CHLORIDE SERPL-SCNC: 108 MMOL/L (ref 101–110)
CHOLEST SERPL-MCNC: 137 MG/DL
CO2 SERPL-SCNC: 28 MMOL/L (ref 21–32)
CREAT SERPL-MCNC: 1.1 MG/DL (ref 0.6–1)
DIFFERENTIAL METHOD BLD: ABNORMAL
EOSINOPHIL # BLD: 0.1 K/UL (ref 0–0.8)
EOSINOPHIL NFR BLD: 1 % (ref 0.5–7.8)
ERYTHROCYTE [DISTWIDTH] IN BLOOD BY AUTOMATED COUNT: 15.1 % (ref 11.9–14.6)
GLOBULIN SER CALC-MCNC: 3.5 G/DL (ref 2.8–4.5)
GLUCOSE SERPL-MCNC: 118 MG/DL (ref 65–100)
HCT VFR BLD AUTO: 35 % (ref 35.8–46.3)
HDLC SERPL-MCNC: 56 MG/DL (ref 40–60)
HDLC SERPL: 2.4
HGB BLD-MCNC: 10.8 G/DL (ref 11.7–15.4)
IMM GRANULOCYTES # BLD AUTO: 0 K/UL (ref 0–0.5)
IMM GRANULOCYTES NFR BLD AUTO: 0 % (ref 0–5)
LDLC SERPL CALC-MCNC: 51.2 MG/DL
LYMPHOCYTES # BLD: 1.4 K/UL (ref 0.5–4.6)
LYMPHOCYTES NFR BLD: 17 % (ref 13–44)
MCH RBC QN AUTO: 28.7 PG (ref 26.1–32.9)
MCHC RBC AUTO-ENTMCNC: 30.9 G/DL (ref 31.4–35)
MCV RBC AUTO: 93.1 FL (ref 82–102)
MONOCYTES # BLD: 0.7 K/UL (ref 0.1–1.3)
MONOCYTES NFR BLD: 8 % (ref 4–12)
NEUTS SEG # BLD: 6.2 K/UL (ref 1.7–8.2)
NEUTS SEG NFR BLD: 73 % (ref 43–78)
NRBC # BLD: 0 K/UL (ref 0–0.2)
PLATELET # BLD AUTO: 256 K/UL (ref 150–450)
PMV BLD AUTO: 11.2 FL (ref 9.4–12.3)
POTASSIUM SERPL-SCNC: 4.3 MMOL/L (ref 3.5–5.1)
PROT SERPL-MCNC: 6.7 G/DL (ref 6.3–8.2)
RBC # BLD AUTO: 3.76 M/UL (ref 4.05–5.2)
SODIUM SERPL-SCNC: 139 MMOL/L (ref 133–143)
TRIGL SERPL-MCNC: 149 MG/DL (ref 35–150)
VLDLC SERPL CALC-MCNC: 29.8 MG/DL (ref 6–23)
WBC # BLD AUTO: 8.4 K/UL (ref 4.3–11.1)

## 2023-03-03 PROCEDURE — 3074F SYST BP LT 130 MM HG: CPT | Performed by: FAMILY MEDICINE

## 2023-03-03 PROCEDURE — 1036F TOBACCO NON-USER: CPT | Performed by: FAMILY MEDICINE

## 2023-03-03 PROCEDURE — 99214 OFFICE O/P EST MOD 30 MIN: CPT | Performed by: FAMILY MEDICINE

## 2023-03-03 PROCEDURE — 1090F PRES/ABSN URINE INCON ASSESS: CPT | Performed by: FAMILY MEDICINE

## 2023-03-03 PROCEDURE — G8417 CALC BMI ABV UP PARAM F/U: HCPCS | Performed by: FAMILY MEDICINE

## 2023-03-03 PROCEDURE — 1123F ACP DISCUSS/DSCN MKR DOCD: CPT | Performed by: FAMILY MEDICINE

## 2023-03-03 PROCEDURE — 3023F SPIROM DOC REV: CPT | Performed by: FAMILY MEDICINE

## 2023-03-03 PROCEDURE — G8427 DOCREV CUR MEDS BY ELIG CLIN: HCPCS | Performed by: FAMILY MEDICINE

## 2023-03-03 PROCEDURE — 3078F DIAST BP <80 MM HG: CPT | Performed by: FAMILY MEDICINE

## 2023-03-03 PROCEDURE — G8484 FLU IMMUNIZE NO ADMIN: HCPCS | Performed by: FAMILY MEDICINE

## 2023-03-03 PROCEDURE — G8399 PT W/DXA RESULTS DOCUMENT: HCPCS | Performed by: FAMILY MEDICINE

## 2023-03-03 PROCEDURE — 3017F COLORECTAL CA SCREEN DOC REV: CPT | Performed by: FAMILY MEDICINE

## 2023-03-03 SDOH — ECONOMIC STABILITY: INCOME INSECURITY: HOW HARD IS IT FOR YOU TO PAY FOR THE VERY BASICS LIKE FOOD, HOUSING, MEDICAL CARE, AND HEATING?: VERY HARD

## 2023-03-03 SDOH — ECONOMIC STABILITY: FOOD INSECURITY: WITHIN THE PAST 12 MONTHS, YOU WORRIED THAT YOUR FOOD WOULD RUN OUT BEFORE YOU GOT MONEY TO BUY MORE.: NEVER TRUE

## 2023-03-03 SDOH — ECONOMIC STABILITY: HOUSING INSECURITY
IN THE LAST 12 MONTHS, WAS THERE A TIME WHEN YOU DID NOT HAVE A STEADY PLACE TO SLEEP OR SLEPT IN A SHELTER (INCLUDING NOW)?: NO

## 2023-03-03 SDOH — ECONOMIC STABILITY: FOOD INSECURITY: WITHIN THE PAST 12 MONTHS, THE FOOD YOU BOUGHT JUST DIDN'T LAST AND YOU DIDN'T HAVE MONEY TO GET MORE.: SOMETIMES TRUE

## 2023-03-03 ASSESSMENT — PATIENT HEALTH QUESTIONNAIRE - PHQ9
SUM OF ALL RESPONSES TO PHQ QUESTIONS 1-9: 0
2. FEELING DOWN, DEPRESSED OR HOPELESS: 0
SUM OF ALL RESPONSES TO PHQ QUESTIONS 1-9: 0
SUM OF ALL RESPONSES TO PHQ QUESTIONS 1-9: 0
SUM OF ALL RESPONSES TO PHQ9 QUESTIONS 1 & 2: 0
1. LITTLE INTEREST OR PLEASURE IN DOING THINGS: 0
SUM OF ALL RESPONSES TO PHQ QUESTIONS 1-9: 0

## 2023-03-04 LAB
EST. AVERAGE GLUCOSE BLD GHB EST-MCNC: 131 MG/DL
HBA1C MFR BLD: 6.2 % (ref 4.8–5.6)

## 2023-03-04 ASSESSMENT — ENCOUNTER SYMPTOMS
ABDOMINAL DISTENTION: 0
NAUSEA: 0
COUGH: 0
EYE PAIN: 0
BLOOD IN STOOL: 0
PHOTOPHOBIA: 0
DIFFICULTY BREATHING: 0
SHORTNESS OF BREATH: 0
SORE THROAT: 0
CHEST TIGHTNESS: 0
ABDOMINAL PAIN: 0
COLOR CHANGE: 0

## 2023-03-04 ASSESSMENT — COPD QUESTIONNAIRES: COPD: 1

## 2023-03-04 NOTE — PROGRESS NOTES
Neo Alejandro  1955 is a 80 y.o. female ,Established patient, here for evaluation of the following chief complaint(s):   Chief Complaint   Patient presents with    2 Week Follow-Up     Coming in to follow up on her having problems breathing she has seen her lung and heart doctor did the x rays and is coming in to just follow up on this. Medication Refill          1. Mixed hyperlipidemia  -     Lipid Panel; Future  2. Abnormal glucose  -     Comprehensive Metabolic Panel; Future  -     Hemoglobin A1C; Future  3. Chronic obstructive pulmonary disease, unspecified COPD type ([facility] [address]  -     CBC with Auto Differential; Future  -     Comprehensive Metabolic Panel; Future  -     albuterol-ipratropium (COMBIVENT RESPIMAT)  MCG/ACT AERS inhaler; Inhale 2 puffs into the lungs every 6 hours as needed for Wheezing, Disp-1 each, R-2Normal  4. Alicia's esophagus without dysplasia--SHE IS TO HAVE EGD/ COLON SOON with history as well of adenomatous polyps. -     CBC with Auto Differential; Future  -     Comprehensive Metabolic Panel; Future    SHE DOES HAVE INTEREST IN WEIGHT LOSS AND WILL CONSIDER EITHER WOJOVY OR OSEMPIC BASED ON AIC RESULTS    Return in about 4 weeks (around 3/31/2023). Subjective   SUBJECTIVE/OBJECTIVE:  Medication Refill  This is a chronic problem. The current episode started more than 1 year ago. The problem occurs daily. The problem has been unchanged. Pertinent negatives include no abdominal pain, chest pain, chills, coughing, fever, headaches, joint swelling, myalgias, nausea, rash, sore throat or weakness. COPD  There is no chest tightness, cough, difficulty breathing or shortness of breath. This is a chronic problem. The problem has been gradually improving. Pertinent negatives include no chest pain, ear pain, fever, headaches, myalgias or sore throat. Review of Systems   Constitutional:  Negative for chills and fever.    HENT:  Negative for ear pain, hearing loss and sore throat. Eyes:  Negative for photophobia and pain. Respiratory:  Negative for cough and shortness of breath. Cardiovascular:  Negative for chest pain, palpitations and leg swelling. Gastrointestinal:  Negative for abdominal distention, abdominal pain, blood in stool and nausea. Genitourinary:  Negative for dysuria and urgency. Musculoskeletal:  Negative for joint swelling and myalgias. Skin:  Negative for color change, pallor and rash. Neurological:  Negative for speech difficulty, weakness, light-headedness and headaches. Hematological:  Negative for adenopathy. Psychiatric/Behavioral:  Negative for agitation, confusion, hallucinations, self-injury and suicidal ideas. Objective   Physical Exam  Constitutional:       Appearance: Normal appearance. HENT:      Head: Normocephalic and atraumatic. Nose: Nose normal.   Eyes:      Extraocular Movements: Extraocular movements intact. Conjunctiva/sclera: Conjunctivae normal.      Pupils: Pupils are equal, round, and reactive to light. Cardiovascular:      Rate and Rhythm: Normal rate and regular rhythm. Pulses: Normal pulses. Heart sounds: Normal heart sounds. Pulmonary:      Effort: Pulmonary effort is normal.      Breath sounds: Normal breath sounds. Abdominal:      General: Abdomen is flat. Bowel sounds are normal.      Palpations: Abdomen is soft. Skin:     General: Skin is warm and dry. Neurological:      General: No focal deficit present. Mental Status: She is alert and oriented to person, place, and time. Psychiatric:         Mood and Affect: Mood normal.                 An electronic signature was used to authenticate this note.     --Guero Matias MD

## 2023-03-31 ENCOUNTER — OFFICE VISIT (OUTPATIENT)
Dept: FAMILY MEDICINE CLINIC | Facility: CLINIC | Age: 68
End: 2023-03-31
Payer: MEDICARE

## 2023-03-31 VITALS
HEIGHT: 65 IN | HEART RATE: 59 BPM | TEMPERATURE: 98.2 F | BODY MASS INDEX: 35.35 KG/M2 | OXYGEN SATURATION: 99 % | SYSTOLIC BLOOD PRESSURE: 120 MMHG | WEIGHT: 212.2 LBS | DIASTOLIC BLOOD PRESSURE: 68 MMHG

## 2023-03-31 DIAGNOSIS — E66.01 MORBID OBESITY DUE TO EXCESS CALORIES (HCC): ICD-10-CM

## 2023-03-31 DIAGNOSIS — D50.8 IRON DEFICIENCY ANEMIA SECONDARY TO INADEQUATE DIETARY IRON INTAKE: Primary | ICD-10-CM

## 2023-03-31 DIAGNOSIS — M25.561 ACUTE PAIN OF RIGHT KNEE: ICD-10-CM

## 2023-03-31 DIAGNOSIS — E66.01 SEVERE OBESITY (BMI 35.0-39.9) WITH COMORBIDITY (HCC): ICD-10-CM

## 2023-03-31 DIAGNOSIS — M25.551 RIGHT HIP PAIN: ICD-10-CM

## 2023-03-31 DIAGNOSIS — H35.3290 EXUDATIVE AGE-RELATED MACULAR DEGENERATION, UNSPECIFIED LATERALITY, UNSPECIFIED STAGE (HCC): ICD-10-CM

## 2023-03-31 DIAGNOSIS — G47.01 INSOMNIA DUE TO MEDICAL CONDITION: ICD-10-CM

## 2023-03-31 PROCEDURE — G8399 PT W/DXA RESULTS DOCUMENT: HCPCS | Performed by: FAMILY MEDICINE

## 2023-03-31 PROCEDURE — 3078F DIAST BP <80 MM HG: CPT | Performed by: FAMILY MEDICINE

## 2023-03-31 PROCEDURE — 3017F COLORECTAL CA SCREEN DOC REV: CPT | Performed by: FAMILY MEDICINE

## 2023-03-31 PROCEDURE — G8417 CALC BMI ABV UP PARAM F/U: HCPCS | Performed by: FAMILY MEDICINE

## 2023-03-31 PROCEDURE — G8427 DOCREV CUR MEDS BY ELIG CLIN: HCPCS | Performed by: FAMILY MEDICINE

## 2023-03-31 PROCEDURE — 1090F PRES/ABSN URINE INCON ASSESS: CPT | Performed by: FAMILY MEDICINE

## 2023-03-31 PROCEDURE — 3074F SYST BP LT 130 MM HG: CPT | Performed by: FAMILY MEDICINE

## 2023-03-31 PROCEDURE — 1036F TOBACCO NON-USER: CPT | Performed by: FAMILY MEDICINE

## 2023-03-31 PROCEDURE — G8484 FLU IMMUNIZE NO ADMIN: HCPCS | Performed by: FAMILY MEDICINE

## 2023-03-31 PROCEDURE — 1123F ACP DISCUSS/DSCN MKR DOCD: CPT | Performed by: FAMILY MEDICINE

## 2023-03-31 PROCEDURE — 99214 OFFICE O/P EST MOD 30 MIN: CPT | Performed by: FAMILY MEDICINE

## 2023-03-31 RX ORDER — SEMAGLUTIDE 1.34 MG/ML
0.25 INJECTION, SOLUTION SUBCUTANEOUS
Qty: 3 ADJUSTABLE DOSE PRE-FILLED PEN SYRINGE | Refills: 2 | Status: SHIPPED | OUTPATIENT
Start: 2023-03-31

## 2023-03-31 RX ORDER — SPIRONOLACTONE AND HYDROCHLOROTHIAZIDE 25; 25 MG/1; MG/1
1 TABLET ORAL DAILY
COMMUNITY
Start: 2023-03-11

## 2023-03-31 RX ORDER — ZOLPIDEM TARTRATE 10 MG/1
10 TABLET ORAL NIGHTLY
Qty: 30 TABLET | Refills: 2 | Status: SHIPPED | OUTPATIENT
Start: 2023-03-31 | End: 2023-06-29

## 2023-03-31 ASSESSMENT — PATIENT HEALTH QUESTIONNAIRE - PHQ9
SUM OF ALL RESPONSES TO PHQ QUESTIONS 1-9: 0
SUM OF ALL RESPONSES TO PHQ9 QUESTIONS 1 & 2: 0
SUM OF ALL RESPONSES TO PHQ QUESTIONS 1-9: 0
1. LITTLE INTEREST OR PLEASURE IN DOING THINGS: 0
SUM OF ALL RESPONSES TO PHQ QUESTIONS 1-9: 0
2. FEELING DOWN, DEPRESSED OR HOPELESS: 0
SUM OF ALL RESPONSES TO PHQ QUESTIONS 1-9: 0

## 2023-04-01 ASSESSMENT — ENCOUNTER SYMPTOMS
ABDOMINAL PAIN: 0
PHOTOPHOBIA: 0
COLOR CHANGE: 0
SORE THROAT: 0
EYE PAIN: 0
COUGH: 0
ABDOMINAL DISTENTION: 0
BLOOD IN STOOL: 0
SHORTNESS OF BREATH: 0
NAUSEA: 0

## 2023-04-01 NOTE — PROGRESS NOTES
Yogi Mina  1955 is a 80 y.o. female ,Established patient, here for evaluation of the following chief complaint(s):   Chief Complaint   Patient presents with    Follow-up     4 weeks- was talking about putting her on ozempic, and is wondering if she can still do this.right hip has really been bother her and hurting more towards the front and not the back, right knee has been acting up as well. Medication Refill          1. Iron deficiency anemia secondary to inadequate dietary iron intake  -     Hemoglobin; Future  -     Ferritin; Future  -     Iron; Future  2. Insomnia due to medical condition  -     zolpidem (AMBIEN) 10 MG tablet; Take 1 tablet by mouth at bedtime for 90 days. Max Daily Amount: 10 mg, Disp-30 tablet, R-2Normal  3. Exudative age-related macular degeneration, unspecified laterality, unspecified stage (HCC)--followed by ophthalmology. 4. Severe obesity (BMI 35.0-39. 9) with comorbidity (HCC)--I have reviewed/discussed the above normal BMI with the patient. I have recommended the following interventions: dietary management education, guidance, and counseling . Leslie Ramirez 5. Right hip pain  -     XR HIP 1 VW W PELVIS RIGHT; Future  -     XR KNEE RIGHT (3 VIEWS); Future  6. Acute pain of right knee  -     XR HIP 1 VW W PELVIS RIGHT; Future  -     XR KNEE RIGHT (3 VIEWS); Future  7. Morbid obesity due to excess calories (HCC)  -     Semaglutide,0.25 or 0.5MG/DOS, (OZEMPIC, 0.25 OR 0.5 MG/DOSE,) 2 MG/1.5ML SOPN; Inject 0.25 mg into the skin every 7 days, Disp-3 Adjustable Dose Pre-filled Pen Syringe, R-2Normal        Return in about 4 weeks (around 4/28/2023). Subjective   SUBJECTIVE/OBJECTIVE:  Medication Refill  This is a chronic problem. The current episode started more than 1 year ago. The problem occurs daily. The problem has been unchanged.  Pertinent negatives include no abdominal pain, anorexia, arthralgias, chest pain, chills, coughing, fever, headaches, joint swelling, myalgias,

## 2023-04-14 ENCOUNTER — NURSE ONLY (OUTPATIENT)
Dept: FAMILY MEDICINE CLINIC | Facility: CLINIC | Age: 68
End: 2023-04-14

## 2023-04-14 DIAGNOSIS — E78.2 MIXED HYPERLIPIDEMIA: ICD-10-CM

## 2023-04-14 DIAGNOSIS — I10 PRIMARY HYPERTENSION: ICD-10-CM

## 2023-04-14 DIAGNOSIS — D50.8 IRON DEFICIENCY ANEMIA SECONDARY TO INADEQUATE DIETARY IRON INTAKE: ICD-10-CM

## 2023-04-14 DIAGNOSIS — E55.9 VITAMIN D DEFICIENCY: ICD-10-CM

## 2023-04-14 LAB
25(OH)D3 SERPL-MCNC: 42 NG/ML (ref 30–100)
ALBUMIN SERPL-MCNC: 3.6 G/DL (ref 3.2–4.6)
ALBUMIN/GLOB SERPL: 1.1 (ref 0.4–1.6)
ALP SERPL-CCNC: 102 U/L (ref 50–136)
ALT SERPL-CCNC: 21 U/L (ref 12–65)
ANION GAP SERPL CALC-SCNC: 1 MMOL/L (ref 2–11)
AST SERPL-CCNC: 16 U/L (ref 15–37)
BASOPHILS # BLD: 0 K/UL (ref 0–0.2)
BASOPHILS NFR BLD: 1 % (ref 0–2)
BILIRUB SERPL-MCNC: 0.9 MG/DL (ref 0.2–1.1)
BUN SERPL-MCNC: 14 MG/DL (ref 8–23)
CALCIUM SERPL-MCNC: 10.1 MG/DL (ref 8.3–10.4)
CHLORIDE SERPL-SCNC: 109 MMOL/L (ref 101–110)
CHOLEST SERPL-MCNC: 116 MG/DL
CO2 SERPL-SCNC: 28 MMOL/L (ref 21–32)
CREAT SERPL-MCNC: 1 MG/DL (ref 0.6–1)
DIFFERENTIAL METHOD BLD: ABNORMAL
EOSINOPHIL # BLD: 0.1 K/UL (ref 0–0.8)
EOSINOPHIL NFR BLD: 2 % (ref 0.5–7.8)
ERYTHROCYTE [DISTWIDTH] IN BLOOD BY AUTOMATED COUNT: 14.7 % (ref 11.9–14.6)
FERRITIN SERPL-MCNC: 10 NG/ML (ref 8–388)
GLOBULIN SER CALC-MCNC: 3.2 G/DL (ref 2.8–4.5)
GLUCOSE SERPL-MCNC: 97 MG/DL (ref 65–100)
HCT VFR BLD AUTO: 36.3 % (ref 35.8–46.3)
HDLC SERPL-MCNC: 51 MG/DL (ref 40–60)
HDLC SERPL: 2.3
HGB BLD-MCNC: 11.2 G/DL (ref 11.7–15.4)
HGB BLD-MCNC: 11.2 G/DL (ref 11.7–15.4)
IMM GRANULOCYTES # BLD AUTO: 0 K/UL (ref 0–0.5)
IMM GRANULOCYTES NFR BLD AUTO: 0 % (ref 0–5)
IRON SERPL-MCNC: 66 UG/DL (ref 35–150)
LDLC SERPL CALC-MCNC: 48.8 MG/DL
LYMPHOCYTES # BLD: 1.1 K/UL (ref 0.5–4.6)
LYMPHOCYTES NFR BLD: 17 % (ref 13–44)
MCH RBC QN AUTO: 28.6 PG (ref 26.1–32.9)
MCHC RBC AUTO-ENTMCNC: 30.9 G/DL (ref 31.4–35)
MCV RBC AUTO: 92.6 FL (ref 82–102)
MONOCYTES # BLD: 0.5 K/UL (ref 0.1–1.3)
MONOCYTES NFR BLD: 8 % (ref 4–12)
NEUTS SEG # BLD: 4.4 K/UL (ref 1.7–8.2)
NEUTS SEG NFR BLD: 73 % (ref 43–78)
NRBC # BLD: 0 K/UL (ref 0–0.2)
PLATELET # BLD AUTO: 223 K/UL (ref 150–450)
PMV BLD AUTO: 11 FL (ref 9.4–12.3)
POTASSIUM SERPL-SCNC: 4.4 MMOL/L (ref 3.5–5.1)
PROT SERPL-MCNC: 6.8 G/DL (ref 6.3–8.2)
RBC # BLD AUTO: 3.92 M/UL (ref 4.05–5.2)
SODIUM SERPL-SCNC: 138 MMOL/L (ref 133–143)
TRIGL SERPL-MCNC: 81 MG/DL (ref 35–150)
VLDLC SERPL CALC-MCNC: 16.2 MG/DL (ref 6–23)
WBC # BLD AUTO: 6.1 K/UL (ref 4.3–11.1)

## 2023-04-24 DIAGNOSIS — M25.551 RIGHT HIP PAIN: ICD-10-CM

## 2023-04-24 DIAGNOSIS — M25.561 ACUTE PAIN OF RIGHT KNEE: ICD-10-CM

## 2023-04-28 ENCOUNTER — OFFICE VISIT (OUTPATIENT)
Dept: FAMILY MEDICINE CLINIC | Facility: CLINIC | Age: 68
End: 2023-04-28

## 2023-04-28 VITALS
DIASTOLIC BLOOD PRESSURE: 68 MMHG | SYSTOLIC BLOOD PRESSURE: 110 MMHG | WEIGHT: 204.8 LBS | OXYGEN SATURATION: 98 % | BODY MASS INDEX: 34.12 KG/M2 | HEART RATE: 68 BPM | HEIGHT: 65 IN | TEMPERATURE: 97.9 F

## 2023-04-28 DIAGNOSIS — Z00.00 ENCOUNTER FOR MEDICARE ANNUAL WELLNESS EXAM: ICD-10-CM

## 2023-04-28 DIAGNOSIS — E66.01 MORBID OBESITY DUE TO EXCESS CALORIES (HCC): Primary | ICD-10-CM

## 2023-04-28 DIAGNOSIS — G47.01 INSOMNIA DUE TO MEDICAL CONDITION: ICD-10-CM

## 2023-04-28 RX ORDER — SEMAGLUTIDE 1.34 MG/ML
0.5 INJECTION, SOLUTION SUBCUTANEOUS
Qty: 4 ADJUSTABLE DOSE PRE-FILLED PEN SYRINGE | Refills: 2 | Status: SHIPPED | OUTPATIENT
Start: 2023-04-28

## 2023-04-28 RX ORDER — DEXLANSOPRAZOLE 60 MG/1
CAPSULE, DELAYED RELEASE ORAL
COMMUNITY
Start: 2023-04-04

## 2023-04-28 RX ORDER — ZOLPIDEM TARTRATE 10 MG/1
10 TABLET ORAL NIGHTLY
Qty: 30 TABLET | Refills: 2 | Status: SHIPPED | OUTPATIENT
Start: 2023-04-28 | End: 2023-07-27

## 2023-04-28 ASSESSMENT — PATIENT HEALTH QUESTIONNAIRE - PHQ9
SUM OF ALL RESPONSES TO PHQ QUESTIONS 1-9: 0
1. LITTLE INTEREST OR PLEASURE IN DOING THINGS: 0
2. FEELING DOWN, DEPRESSED OR HOPELESS: 0
SUM OF ALL RESPONSES TO PHQ QUESTIONS 1-9: 0
SUM OF ALL RESPONSES TO PHQ9 QUESTIONS 1 & 2: 0

## 2023-04-28 ASSESSMENT — LIFESTYLE VARIABLES
HOW OFTEN DO YOU HAVE A DRINK CONTAINING ALCOHOL: NEVER
HOW MANY STANDARD DRINKS CONTAINING ALCOHOL DO YOU HAVE ON A TYPICAL DAY: PATIENT DOES NOT DRINK

## 2023-04-28 NOTE — PROGRESS NOTES
Medicare Annual Wellness Visit    Farrah Drake is here for Follow-up (4 week- been doing good on the medicine no side effects.) and Medicare AWV    Assessment & Plan   Insomnia due to medical condition  The following orders have not been finalized:  -     zolpidem (AMBIEN) 10 MG tablet    Recommendations for Preventive Services Due: see orders and patient instructions/AVS.  Recommended screening schedule for the next 5-10 years is provided to the patient in written form: see Patient Instructions/AVS.     No follow-ups on file. Subjective   The following acute and/or chronic problems were also addressed today:  INSOMNIA    Patient's complete Health Risk Assessment and screening values have been reviewed and are found in Flowsheets. The following problems were reviewed today and where indicated follow up appointments were made and/or referrals ordered. Positive Risk Factor Screenings with Interventions:                Opioid Risk: (Low risk score <55) Opioid risk score: 12    Patient is low risk for opioid use disorder or overdose. Last PDMP Guillaume as Reviewed:  Review User Review Instant Review Result                    Weight and Activity:  Physical Activity: Sufficiently Active    Days of Exercise per Week: 3 days    Minutes of Exercise per Session: 60 min     On average, how many days per week do you engage in moderate to strenuous exercise (like a brisk walk)?: 3 days  Have you lost any weight without trying in the past 3 months?: No  Body mass index is 34.08 kg/m². (!) Abnormal    Obesity Interventions:  low carbohydrate diet               Advanced Directives:  Do you have a Living Will?: (!) No    Intervention:         Lung Cancer Screening:  Guidelines regarding LDCT screening for lung cancer reviewed. Patient declined screening.                     Objective   Vitals:    04/28/23 1130   BP: 110/68   Site: Left Upper Arm   Position: Sitting   Cuff Size: Medium Adult   Pulse: 68   Temp: 97.9 °F (36.6

## 2023-07-26 ENCOUNTER — OFFICE VISIT (OUTPATIENT)
Dept: FAMILY MEDICINE CLINIC | Facility: CLINIC | Age: 68
End: 2023-07-26
Payer: MEDICARE

## 2023-07-26 VITALS
BODY MASS INDEX: 31.58 KG/M2 | SYSTOLIC BLOOD PRESSURE: 120 MMHG | TEMPERATURE: 98.2 F | OXYGEN SATURATION: 98 % | DIASTOLIC BLOOD PRESSURE: 60 MMHG | HEART RATE: 69 BPM | WEIGHT: 189.8 LBS

## 2023-07-26 DIAGNOSIS — E11.9 TYPE 2 DIABETES MELLITUS WITHOUT COMPLICATION, WITHOUT LONG-TERM CURRENT USE OF INSULIN (HCC): Primary | ICD-10-CM

## 2023-07-26 DIAGNOSIS — G47.01 INSOMNIA DUE TO MEDICAL CONDITION: ICD-10-CM

## 2023-07-26 DIAGNOSIS — Z12.31 ENCOUNTER FOR SCREENING MAMMOGRAM FOR MALIGNANT NEOPLASM OF BREAST: ICD-10-CM

## 2023-07-26 DIAGNOSIS — E03.8 OTHER SPECIFIED HYPOTHYROIDISM: ICD-10-CM

## 2023-07-26 DIAGNOSIS — E78.49 OTHER HYPERLIPIDEMIA: ICD-10-CM

## 2023-07-26 PROCEDURE — G8417 CALC BMI ABV UP PARAM F/U: HCPCS | Performed by: FAMILY MEDICINE

## 2023-07-26 PROCEDURE — 3074F SYST BP LT 130 MM HG: CPT | Performed by: FAMILY MEDICINE

## 2023-07-26 PROCEDURE — 1123F ACP DISCUSS/DSCN MKR DOCD: CPT | Performed by: FAMILY MEDICINE

## 2023-07-26 PROCEDURE — 3044F HG A1C LEVEL LT 7.0%: CPT | Performed by: FAMILY MEDICINE

## 2023-07-26 PROCEDURE — 3078F DIAST BP <80 MM HG: CPT | Performed by: FAMILY MEDICINE

## 2023-07-26 PROCEDURE — 3017F COLORECTAL CA SCREEN DOC REV: CPT | Performed by: FAMILY MEDICINE

## 2023-07-26 PROCEDURE — 83036 HEMOGLOBIN GLYCOSYLATED A1C: CPT | Performed by: FAMILY MEDICINE

## 2023-07-26 PROCEDURE — 99214 OFFICE O/P EST MOD 30 MIN: CPT | Performed by: FAMILY MEDICINE

## 2023-07-26 PROCEDURE — G8427 DOCREV CUR MEDS BY ELIG CLIN: HCPCS | Performed by: FAMILY MEDICINE

## 2023-07-26 PROCEDURE — G8399 PT W/DXA RESULTS DOCUMENT: HCPCS | Performed by: FAMILY MEDICINE

## 2023-07-26 PROCEDURE — 1036F TOBACCO NON-USER: CPT | Performed by: FAMILY MEDICINE

## 2023-07-26 PROCEDURE — 2022F DILAT RTA XM EVC RTNOPTHY: CPT | Performed by: FAMILY MEDICINE

## 2023-07-26 PROCEDURE — 1090F PRES/ABSN URINE INCON ASSESS: CPT | Performed by: FAMILY MEDICINE

## 2023-07-26 RX ORDER — ZOLPIDEM TARTRATE 10 MG/1
10 TABLET ORAL NIGHTLY
Qty: 30 TABLET | Refills: 2 | Status: SHIPPED | OUTPATIENT
Start: 2023-07-26 | End: 2023-10-24

## 2023-07-26 RX ORDER — SEMAGLUTIDE 0.68 MG/ML
INJECTION, SOLUTION SUBCUTANEOUS
COMMUNITY
Start: 2023-07-11 | End: 2023-07-26 | Stop reason: SDUPTHER

## 2023-07-26 RX ORDER — SEMAGLUTIDE 1.34 MG/ML
1 INJECTION, SOLUTION SUBCUTANEOUS
Qty: 3 ML | Refills: 5 | Status: SHIPPED | OUTPATIENT
Start: 2023-07-26

## 2023-07-27 LAB — HBA1C MFR BLD: 5.7 %

## 2023-07-28 ASSESSMENT — ENCOUNTER SYMPTOMS
COUGH: 0
COLOR CHANGE: 0
ABDOMINAL PAIN: 0
EYE PAIN: 0
BLOOD IN STOOL: 0
ABDOMINAL DISTENTION: 0
PHOTOPHOBIA: 0
SORE THROAT: 0
SHORTNESS OF BREATH: 0
NAUSEA: 0

## 2023-07-28 NOTE — PROGRESS NOTES
Aristeo Mackenzie  1955 is a 80 y.o. female ,Established patient, here for evaluation of the following chief complaint(s):   Chief Complaint   Patient presents with    3 Month Follow-Up     Pt is not fasting-     Medication Refill          1. Type 2 diabetes mellitus without complication, without long-term current use of insulin (HCC)  -     AMB POC HEMOGLOBIN A1C  -     Semaglutide, 1 MG/DOSE, (OZEMPIC, 1 MG/DOSE,) 4 MG/3ML SOPN; Inject 1 mg into the skin every 7 days, Disp-3 mL, R-5Normal  2. Insomnia due to medical condition  -     zolpidem (AMBIEN) 10 MG tablet; Take 1 tablet by mouth at bedtime for 90 days. Max Daily Amount: 10 mg, Disp-30 tablet, R-2Normal  3. Encounter for screening mammogram for malignant neoplasm of breast  -     GERM[place]N JEAN DIGITAL SCREEN BILATERAL; Future        Return in about 3 months (around 10/26/2023). Subjective   SUBJECTIVE/OBJECTIVE:  She has had to reschedule her GI appt. --to follow BARRETTS ESOPHAGUS. Medication Refill  This is a chronic problem. The current episode started more than 1 year ago. The problem occurs daily. The problem has been unchanged. Pertinent negatives include no abdominal pain, chest pain, chills, coughing, fever, headaches, joint swelling, myalgias, nausea, rash, sore throat or weakness. Insomnia  This is a chronic problem. The current episode started more than 1 year ago. The problem occurs daily. The problem has been gradually improving. Pertinent negatives include no abdominal pain, chest pain, chills, coughing, fever, headaches, joint swelling, myalgias, nausea, rash, sore throat or weakness. Diabetes  She presents for her follow-up (she would like to increase ozempic) diabetic visit. She has type 2 diabetes mellitus. Her disease course has been improving. Pertinent negatives for hypoglycemia include no confusion, headaches, pallor or speech difficulty. Pertinent negatives for diabetes include no chest pain and no weakness.      Review of

## 2023-08-11 ENCOUNTER — OFFICE VISIT (OUTPATIENT)
Age: 68
End: 2023-08-11
Payer: MEDICARE

## 2023-08-11 VITALS
SYSTOLIC BLOOD PRESSURE: 134 MMHG | DIASTOLIC BLOOD PRESSURE: 63 MMHG | WEIGHT: 188 LBS | BODY MASS INDEX: 31.32 KG/M2 | HEART RATE: 75 BPM | HEIGHT: 65 IN

## 2023-08-11 DIAGNOSIS — G47.10 HYPERSOMNIA: ICD-10-CM

## 2023-08-11 DIAGNOSIS — I25.118 ATHEROSCLEROTIC HEART DISEASE OF NATIVE CORONARY ARTERY WITH OTHER FORMS OF ANGINA PECTORIS (HCC): Primary | ICD-10-CM

## 2023-08-11 DIAGNOSIS — I10 HTN (HYPERTENSION), BENIGN: ICD-10-CM

## 2023-08-11 DIAGNOSIS — E74.39 GLUCOSE INTOLERANCE: ICD-10-CM

## 2023-08-11 DIAGNOSIS — J06.9 UPPER RESPIRATORY TRACT INFECTION, UNSPECIFIED TYPE: ICD-10-CM

## 2023-08-11 PROCEDURE — G8417 CALC BMI ABV UP PARAM F/U: HCPCS | Performed by: INTERNAL MEDICINE

## 2023-08-11 PROCEDURE — G8399 PT W/DXA RESULTS DOCUMENT: HCPCS | Performed by: INTERNAL MEDICINE

## 2023-08-11 PROCEDURE — 1036F TOBACCO NON-USER: CPT | Performed by: INTERNAL MEDICINE

## 2023-08-11 PROCEDURE — 99214 OFFICE O/P EST MOD 30 MIN: CPT | Performed by: INTERNAL MEDICINE

## 2023-08-11 PROCEDURE — 1090F PRES/ABSN URINE INCON ASSESS: CPT | Performed by: INTERNAL MEDICINE

## 2023-08-11 PROCEDURE — 3075F SYST BP GE 130 - 139MM HG: CPT | Performed by: INTERNAL MEDICINE

## 2023-08-11 PROCEDURE — 3017F COLORECTAL CA SCREEN DOC REV: CPT | Performed by: INTERNAL MEDICINE

## 2023-08-11 PROCEDURE — 1123F ACP DISCUSS/DSCN MKR DOCD: CPT | Performed by: INTERNAL MEDICINE

## 2023-08-11 PROCEDURE — 3078F DIAST BP <80 MM HG: CPT | Performed by: INTERNAL MEDICINE

## 2023-08-11 PROCEDURE — G8428 CUR MEDS NOT DOCUMENT: HCPCS | Performed by: INTERNAL MEDICINE

## 2023-08-11 RX ORDER — AZITHROMYCIN 250 MG/1
250 TABLET, FILM COATED ORAL SEE ADMIN INSTRUCTIONS
Qty: 6 TABLET | Refills: 0 | Status: SHIPPED | OUTPATIENT
Start: 2023-08-11 | End: 2023-08-16

## 2023-08-11 RX ORDER — PREDNISONE 20 MG/1
20 TABLET ORAL DAILY
Qty: 5 TABLET | Refills: 0 | Status: SHIPPED | OUTPATIENT
Start: 2023-08-11 | End: 2023-08-16

## 2023-08-11 ASSESSMENT — ENCOUNTER SYMPTOMS
COUGH: 1
STRIDOR: 0
APHONIA: 0
EYE PAIN: 0
NAIL CHANGES: 0
ABDOMINAL PAIN: 0
SPUTUM PRODUCTION: 1

## 2023-08-11 NOTE — PROGRESS NOTES
[address] [place]  [address] [facility], [facility], [address]  PHONE: [phone]    SUBJECTIVE:   Dameon Scott is a 80 y.o. female 1955   seen for a follow up visit regarding the following:     Chief Complaint   Patient presents with    Coronary Artery Disease     History of present illness: 80 y.o. female presented for follow-up 8/11/23 cough aftr traveling to [place]     Interval Hx:   Patient is very concerned with CAD due to family with history of MI. Back surgery pain no better. Tearful today. Pain. The patient had a previous history of hypertension, as well as nonobstructive coronary artery disease. She presented off schedule 04/27/2018. Unfortunately, she had an episode of angioedema that occurred associated with ACE inhibitor therapy. The patient  was [place] [facility]. She was given steroids, as well as Benadryl with resolution. Her Lisinopril was discontinued. She presented 11/30/2018 scheduled back surgery possibly due to bulging disc. Cardiac history:    Cardiac catheterization was in 2010 at [facility]. 2015 with stress perfusion imaging that was normal.    Cardiac catheterization 5/11/2016 mild nonobstructive coronary disease with moderate diffuse calcifications noted. left main angiographically normal, LAD mild nonobstructive disease with focal stenosis at the origin in the first diagonal branch, 20%  narrowing at the circumflex, small low grade calcification luminal irregularities, right coronary and large PDA with 30% stenosis. Ranexa initiated 12/2016 with improvement of some symptoms patient discontinued. CT abdomen with normal adrenal glands. 12/2017 [address] total cholesterol 116, triglycerides 70, HDL 50, LDL 52, VLDL 14.    04/2018 angioedema on ACE inhibitor. 04/27/2018 Metoprolol was changed to Bystolic. Initiated on Aldactazide, on Amlodipine 5 mg.

## 2023-09-22 ENCOUNTER — TELEPHONE (OUTPATIENT)
Dept: FAMILY MEDICINE CLINIC | Facility: CLINIC | Age: 68
End: 2023-09-22

## 2023-09-22 DIAGNOSIS — E66.01 MORBID OBESITY (HCC): Primary | ICD-10-CM

## 2023-09-22 DIAGNOSIS — R73.03 PREDIABETES: ICD-10-CM

## 2023-09-22 RX ORDER — SEMAGLUTIDE 2.68 MG/ML
2 INJECTION, SOLUTION SUBCUTANEOUS
Qty: 3 ML | Refills: 2 | Status: SHIPPED | OUTPATIENT
Start: 2023-09-22

## 2023-09-22 NOTE — TELEPHONE ENCOUNTER
----- Message from Italo Flores sent at 9/22/2023 10:00 AM EDT -----  Subject: Medication Problem    Medication: Semaglutide, 1 MG/DOSE, (OZEMPIC, 1 MG/DOSE,) 4 MG/3ML SOPN  Dosage: 1 a Week  Ordering Provider: fabiana    Question/Problem: Patient can not get the 1mg of this medication. Patient   requesting that the medication be increased to 2mg because the pharmacy   has that.       Pharmacy: [address]   [phone] Rodolfo Nicolas [phone]    ---------------------------------------------------------------------------  --------------  Celia ODONNELL  [phone]; OK to leave message on voicemail  ---------------------------------------------------------------------------  --------------    SCRIPT ANSWERS  Relationship to Patient: Self

## 2023-10-04 ENCOUNTER — OFFICE VISIT (OUTPATIENT)
Age: 68
End: 2023-10-04
Payer: MEDICARE

## 2023-10-04 VITALS
SYSTOLIC BLOOD PRESSURE: 120 MMHG | WEIGHT: 179 LBS | HEART RATE: 65 BPM | HEIGHT: 65 IN | BODY MASS INDEX: 29.82 KG/M2 | DIASTOLIC BLOOD PRESSURE: 64 MMHG

## 2023-10-04 DIAGNOSIS — G47.10 HYPERSOMNOLENCE: ICD-10-CM

## 2023-10-04 DIAGNOSIS — I10 HYPERTENSION, ESSENTIAL: ICD-10-CM

## 2023-10-04 DIAGNOSIS — R00.2 PALPITATIONS: ICD-10-CM

## 2023-10-04 DIAGNOSIS — I10 HTN (HYPERTENSION), BENIGN: Primary | ICD-10-CM

## 2023-10-04 DIAGNOSIS — I25.118 ATHEROSCLEROTIC HEART DISEASE OF NATIVE CORONARY ARTERY WITH OTHER FORMS OF ANGINA PECTORIS (HCC): ICD-10-CM

## 2023-10-04 PROCEDURE — 3074F SYST BP LT 130 MM HG: CPT | Performed by: INTERNAL MEDICINE

## 2023-10-04 PROCEDURE — 1036F TOBACCO NON-USER: CPT | Performed by: INTERNAL MEDICINE

## 2023-10-04 PROCEDURE — 99214 OFFICE O/P EST MOD 30 MIN: CPT | Performed by: INTERNAL MEDICINE

## 2023-10-04 PROCEDURE — 3017F COLORECTAL CA SCREEN DOC REV: CPT | Performed by: INTERNAL MEDICINE

## 2023-10-04 PROCEDURE — G8484 FLU IMMUNIZE NO ADMIN: HCPCS | Performed by: INTERNAL MEDICINE

## 2023-10-04 PROCEDURE — 1123F ACP DISCUSS/DSCN MKR DOCD: CPT | Performed by: INTERNAL MEDICINE

## 2023-10-04 PROCEDURE — G8417 CALC BMI ABV UP PARAM F/U: HCPCS | Performed by: INTERNAL MEDICINE

## 2023-10-04 PROCEDURE — 3078F DIAST BP <80 MM HG: CPT | Performed by: INTERNAL MEDICINE

## 2023-10-04 PROCEDURE — 93000 ELECTROCARDIOGRAM COMPLETE: CPT | Performed by: INTERNAL MEDICINE

## 2023-10-04 PROCEDURE — G8427 DOCREV CUR MEDS BY ELIG CLIN: HCPCS | Performed by: INTERNAL MEDICINE

## 2023-10-04 PROCEDURE — G8399 PT W/DXA RESULTS DOCUMENT: HCPCS | Performed by: INTERNAL MEDICINE

## 2023-10-04 PROCEDURE — 1090F PRES/ABSN URINE INCON ASSESS: CPT | Performed by: INTERNAL MEDICINE

## 2023-10-04 ASSESSMENT — ENCOUNTER SYMPTOMS
SPUTUM PRODUCTION: 1
STRIDOR: 0
ABDOMINAL PAIN: 0
EYE PAIN: 0
COUGH: 1
APHONIA: 0
NAIL CHANGES: 0

## 2023-10-04 NOTE — PROGRESS NOTES
[address] [place]  [address] [facility], [facility], [address]  PHONE: [phone]    SUBJECTIVE:   Garrison Robles is a 80 y.o. female 1955   seen for a follow up visit regarding the following:     Chief Complaint   Patient presents with    Hypertension     History of present illness: 80 y.o. female presented for follow-up 10/4/23 off schedule with palpitations    Interval Hx:   Patient is very concerned with CAD due to family with history of MI. Back surgery pain no better. Tearful today. Pain. The patient had a previous history of hypertension, as well as nonobstructive coronary artery disease. She presented off schedule 04/27/2018. Unfortunately, she had an episode of angioedema that occurred associated with ACE inhibitor therapy. The patient  was seen in [facility]. She was given steroids, as well as Benadryl with resolution. Her Lisinopril was discontinued. She presented 11/30/2018 scheduled back surgery possibly due to bulging disc. Cardiac history:    Cardiac catheterization was in 2010 at [facility]. 2015 with stress perfusion imaging that was normal.    Cardiac catheterization 5/11/2016 mild nonobstructive coronary disease with moderate diffuse calcifications noted. left main angiographically normal, LAD mild nonobstructive disease with focal stenosis at the origin in the first diagonal branch, 20%  narrowing at the circumflex, small low grade calcification luminal irregularities, right coronary and large PDA with 30% stenosis. Ranexa initiated 12/2016 with improvement of some symptoms patient discontinued. CT abdomen with normal adrenal glands. 12/2017 [address] total cholesterol 116, triglycerides 70, HDL 50, LDL 52, VLDL 14.    04/2018 angioedema on ACE inhibitor. 04/27/2018 Metoprolol was changed to Bystolic. Initiated on Aldactazide, on Amlodipine 5 mg.       LAbs 10/23 [facility]

## 2023-10-17 ENCOUNTER — TELEPHONE (OUTPATIENT)
Dept: FAMILY MEDICINE CLINIC | Facility: CLINIC | Age: 68
End: 2023-10-17

## 2023-10-17 NOTE — TELEPHONE ENCOUNTER
[facility] called in regards to this patient  and would like to talk to a clinical staff member in regards to a medication and diagnosis for this patient.     Thank you

## 2023-10-19 ENCOUNTER — OFFICE VISIT (OUTPATIENT)
Dept: FAMILY MEDICINE CLINIC | Facility: CLINIC | Age: 68
End: 2023-10-19
Payer: MEDICARE

## 2023-10-19 VITALS
HEART RATE: 77 BPM | BODY MASS INDEX: 29.09 KG/M2 | OXYGEN SATURATION: 97 % | SYSTOLIC BLOOD PRESSURE: 124 MMHG | DIASTOLIC BLOOD PRESSURE: 70 MMHG | WEIGHT: 174.8 LBS | TEMPERATURE: 98.3 F

## 2023-10-19 DIAGNOSIS — I10 PRIMARY HYPERTENSION: ICD-10-CM

## 2023-10-19 DIAGNOSIS — R73.03 PREDIABETES: Primary | ICD-10-CM

## 2023-10-19 DIAGNOSIS — Z98.890 BACK PAIN WITH HISTORY OF SPINAL SURGERY: ICD-10-CM

## 2023-10-19 DIAGNOSIS — E78.2 MIXED HYPERLIPIDEMIA: ICD-10-CM

## 2023-10-19 DIAGNOSIS — M54.50 ACUTE BILATERAL LOW BACK PAIN WITHOUT SCIATICA: ICD-10-CM

## 2023-10-19 DIAGNOSIS — M54.9 BACK PAIN WITH HISTORY OF SPINAL SURGERY: ICD-10-CM

## 2023-10-19 DIAGNOSIS — E03.8 OTHER SPECIFIED HYPOTHYROIDISM: ICD-10-CM

## 2023-10-19 DIAGNOSIS — M54.9 ACUTE UPPER BACK PAIN: ICD-10-CM

## 2023-10-19 LAB
ALBUMIN SERPL-MCNC: 3.6 G/DL (ref 3.2–4.6)
ALBUMIN/GLOB SERPL: 1 (ref 0.4–1.6)
ALP SERPL-CCNC: 93 U/L (ref 50–136)
ALT SERPL-CCNC: 18 U/L (ref 12–65)
ANION GAP SERPL CALC-SCNC: 8 MMOL/L (ref 2–11)
AST SERPL-CCNC: 19 U/L (ref 15–37)
BASOPHILS # BLD: 0.1 K/UL (ref 0–0.2)
BASOPHILS NFR BLD: 1 % (ref 0–2)
BILIRUB SERPL-MCNC: 0.8 MG/DL (ref 0.2–1.1)
BUN SERPL-MCNC: 11 MG/DL (ref 8–23)
CALCIUM SERPL-MCNC: 9.7 MG/DL (ref 8.3–10.4)
CHLORIDE SERPL-SCNC: 108 MMOL/L (ref 101–110)
CHOLEST SERPL-MCNC: 116 MG/DL
CO2 SERPL-SCNC: 26 MMOL/L (ref 21–32)
CREAT SERPL-MCNC: 1.1 MG/DL (ref 0.6–1)
DIFFERENTIAL METHOD BLD: NORMAL
EOSINOPHIL # BLD: 0.1 K/UL (ref 0–0.8)
EOSINOPHIL NFR BLD: 1 % (ref 0.5–7.8)
ERYTHROCYTE [DISTWIDTH] IN BLOOD BY AUTOMATED COUNT: 14.4 % (ref 11.9–14.6)
GLOBULIN SER CALC-MCNC: 3.6 G/DL (ref 2.8–4.5)
GLUCOSE SERPL-MCNC: 89 MG/DL (ref 65–100)
HCT VFR BLD AUTO: 36.6 % (ref 35.8–46.3)
HDLC SERPL-MCNC: 49 MG/DL (ref 40–60)
HDLC SERPL: 2.4
HGB BLD-MCNC: 11.8 G/DL (ref 11.7–15.4)
IMM GRANULOCYTES # BLD AUTO: 0 K/UL (ref 0–0.5)
IMM GRANULOCYTES NFR BLD AUTO: 0 % (ref 0–5)
LDLC SERPL CALC-MCNC: 57.2 MG/DL
LYMPHOCYTES # BLD: 1.4 K/UL (ref 0.5–4.6)
LYMPHOCYTES NFR BLD: 21 % (ref 13–44)
MCH RBC QN AUTO: 28.9 PG (ref 26.1–32.9)
MCHC RBC AUTO-ENTMCNC: 32.2 G/DL (ref 31.4–35)
MCV RBC AUTO: 89.5 FL (ref 82–102)
MONOCYTES # BLD: 0.4 K/UL (ref 0.1–1.3)
MONOCYTES NFR BLD: 6 % (ref 4–12)
NEUTS SEG # BLD: 4.6 K/UL (ref 1.7–8.2)
NEUTS SEG NFR BLD: 71 % (ref 43–78)
NRBC # BLD: 0 K/UL (ref 0–0.2)
PLATELET # BLD AUTO: 236 K/UL (ref 150–450)
PMV BLD AUTO: 11.4 FL (ref 9.4–12.3)
POTASSIUM SERPL-SCNC: 4 MMOL/L (ref 3.5–5.1)
PROT SERPL-MCNC: 7.2 G/DL (ref 6.3–8.2)
RBC # BLD AUTO: 4.09 M/UL (ref 4.05–5.2)
SODIUM SERPL-SCNC: 142 MMOL/L (ref 133–143)
TRIGL SERPL-MCNC: 49 MG/DL (ref 35–150)
TSH, 3RD GENERATION: 0.25 UIU/ML (ref 0.36–3.74)
VLDLC SERPL CALC-MCNC: 9.8 MG/DL (ref 6–23)
WBC # BLD AUTO: 6.5 K/UL (ref 4.3–11.1)

## 2023-10-19 PROCEDURE — 3074F SYST BP LT 130 MM HG: CPT | Performed by: FAMILY MEDICINE

## 2023-10-19 PROCEDURE — 3017F COLORECTAL CA SCREEN DOC REV: CPT | Performed by: FAMILY MEDICINE

## 2023-10-19 PROCEDURE — G8484 FLU IMMUNIZE NO ADMIN: HCPCS | Performed by: FAMILY MEDICINE

## 2023-10-19 PROCEDURE — G8417 CALC BMI ABV UP PARAM F/U: HCPCS | Performed by: FAMILY MEDICINE

## 2023-10-19 PROCEDURE — G8427 DOCREV CUR MEDS BY ELIG CLIN: HCPCS | Performed by: FAMILY MEDICINE

## 2023-10-19 PROCEDURE — 99214 OFFICE O/P EST MOD 30 MIN: CPT | Performed by: FAMILY MEDICINE

## 2023-10-19 PROCEDURE — 1036F TOBACCO NON-USER: CPT | Performed by: FAMILY MEDICINE

## 2023-10-19 PROCEDURE — 3078F DIAST BP <80 MM HG: CPT | Performed by: FAMILY MEDICINE

## 2023-10-19 PROCEDURE — 1123F ACP DISCUSS/DSCN MKR DOCD: CPT | Performed by: FAMILY MEDICINE

## 2023-10-19 PROCEDURE — 1090F PRES/ABSN URINE INCON ASSESS: CPT | Performed by: FAMILY MEDICINE

## 2023-10-19 PROCEDURE — G8399 PT W/DXA RESULTS DOCUMENT: HCPCS | Performed by: FAMILY MEDICINE

## 2023-10-19 ASSESSMENT — ENCOUNTER SYMPTOMS
BLOOD IN STOOL: 0
COLOR CHANGE: 0
COUGH: 0
SHORTNESS OF BREATH: 0
NAUSEA: 0
EYE PAIN: 0
SORE THROAT: 0
PHOTOPHOBIA: 0
ABDOMINAL PAIN: 0
BACK PAIN: 1
ABDOMINAL DISTENTION: 0
BLURRED VISION: 0

## 2023-10-20 ENCOUNTER — TELEPHONE (OUTPATIENT)
Dept: FAMILY MEDICINE CLINIC | Facility: CLINIC | Age: 68
End: 2023-10-20

## 2023-10-20 LAB
EST. AVERAGE GLUCOSE BLD GHB EST-MCNC: 114 MG/DL
HBA1C MFR BLD: 5.6 % (ref 4.8–5.6)

## 2023-10-20 NOTE — TELEPHONE ENCOUNTER
Pharmacy needs verification  of the diabetes diagnosis. There is no A1C and no other notes she can go by. Please give them a call.     Thank you

## 2023-10-20 NOTE — TELEPHONE ENCOUNTER
Leon who sees James Jerome stated she needs a medication refill for zolpidem (AMBIEN) 10 MG tablet, sent to [facility] pharmacy   Thank Nellie Pathka

## 2023-10-20 NOTE — PROGRESS NOTES
Kesha Kern  1955 is a 80 y.o. female ,Established patient, here for evaluation of the following chief complaint(s):   Chief Complaint   Patient presents with    3 Month Follow-Up     Pt is fasting- been having a lot of back and neck pain just would like to discuss options for her          1. Prediabetes  -     CBC with Auto Differential; Future  -     Comprehensive Metabolic Panel; Future  -     Hemoglobin A1C; Future  2. Mixed hyperlipidemia  -     Lipid Panel; Future  3. Primary hypertension  4. Other specified hypothyroidism  -     TSH; Future  5. Acute upper back pain  -     XR THORACIC SPINE (MIN 4 VIEWS); Future  6. Acute bilateral low back pain without sciatica  -     XR LUMBAR SPINE (MIN 4 VIEWS); Future  7. Back pain with history of spinal surgery  -     XR LUMBAR SPINE (MIN 4 VIEWS); Future        Return in about 3 months (around 1/19/2024), or if symptoms worsen or fail to improve. Subjective   SUBJECTIVE/OBJECTIVE:  Hypertension  This is a chronic problem. The current episode started more than 1 year ago. The problem has been gradually improving since onset. The problem is controlled. Pertinent negatives include no anxiety, blurred vision, chest pain, headaches, palpitations, peripheral edema, PND, shortness of breath or sweats. Hyperlipidemia  This is a chronic problem. The current episode started more than 1 year ago. The problem is controlled. Recent lipid tests were reviewed and are normal. Pertinent negatives include no chest pain, myalgias or shortness of breath. Back Pain  This is a recurrent problem. The current episode started more than 1 month ago. The problem occurs daily. The problem is unchanged. The quality of the pain is described as aching. The pain does not radiate. The pain is at a severity of 5/10. The pain is moderate. The symptoms are aggravated by bending, standing and position.  Pertinent negatives include no abdominal pain, bladder incontinence, chest pain,

## 2023-10-23 DIAGNOSIS — G47.01 INSOMNIA DUE TO MEDICAL CONDITION: ICD-10-CM

## 2023-10-23 RX ORDER — ZOLPIDEM TARTRATE 10 MG/1
10 TABLET ORAL NIGHTLY
Qty: 30 TABLET | Refills: 2 | Status: SHIPPED | OUTPATIENT
Start: 2023-10-30 | End: 2024-01-28

## 2023-10-23 NOTE — TELEPHONE ENCOUNTER
Haylee Waller sent in zolpidem today   In regards to dm - she has prediabetes not diabetes in regards to other meds so they may not pay for ozempic until a1c is above 6.4

## 2023-10-27 NOTE — TELEPHONE ENCOUNTER
----- Message from Zechariah Kebede MD sent at 10/25/2023  7:54 AM EDT -----  [id number] Score for Atrial Fibrillation Stroke Risk 4     Recent episodes of atrial flutter. Please schedule patient for follow-up appointment to discuss the results. Additionally let patient know we would recommend anticoagulation therapy with Eliquis 5 mg p.o. twice daily.

## 2023-10-30 ENCOUNTER — TELEPHONE (OUTPATIENT)
Dept: FAMILY MEDICINE CLINIC | Facility: CLINIC | Age: 68
End: 2023-10-30

## 2023-10-30 NOTE — TELEPHONE ENCOUNTER
Could you call pt and let her know the labs have been printed and whenever she would like she can pick them up.

## 2023-10-30 NOTE — TELEPHONE ENCOUNTER
Called pt and relayed Dr. Anai Deras message. Pt voiced understanding. Scheduled pt for follow up 11/3/23.         Requested Prescriptions     Pending Prescriptions Disp Refills    apixaban (ELIQUIS) 5 MG TABS tablet 180 tablet 3     Sig: Take 1 tablet by mouth 2 times daily

## 2023-10-30 NOTE — TELEPHONE ENCOUNTER
----- Message from [address] [facility] [address] sent at 10/30/2023  1:59 PM EDT -----  Subject: Message to Provider    QUESTIONS  Information for Provider? Marta Hutchinson would like a copy of her labs from   10/19/2023. She said she would pick it up. Thank you   ---------------------------------------------------------------------------  --------------  Emerita Hooper INFO  [phone]; OK to leave message on voicemail  ---------------------------------------------------------------------------  --------------  SCRIPT ANSWERS  Relationship to Patient?  Self

## 2023-10-30 NOTE — TELEPHONE ENCOUNTER
----- Message from Asia Melara MD sent at 10/25/2023  7:54 AM EDT -----  [id number] Score for Atrial Fibrillation Stroke Risk 4     Recent episodes of atrial flutter. Please schedule patient for follow-up appointment to discuss the results. Additionally let patient know we would recommend anticoagulation therapy with Eliquis 5 mg p.o. twice daily.

## 2023-11-03 ENCOUNTER — OFFICE VISIT (OUTPATIENT)
Age: 68
End: 2023-11-03
Payer: MEDICARE

## 2023-11-03 VITALS
HEART RATE: 68 BPM | SYSTOLIC BLOOD PRESSURE: 146 MMHG | WEIGHT: 174 LBS | BODY MASS INDEX: 28.99 KG/M2 | DIASTOLIC BLOOD PRESSURE: 70 MMHG | HEIGHT: 65 IN

## 2023-11-03 DIAGNOSIS — Z79.899 LONG TERM CURRENT USE OF ANTIARRHYTHMIC DRUG: ICD-10-CM

## 2023-11-03 DIAGNOSIS — I10 HTN (HYPERTENSION), BENIGN: ICD-10-CM

## 2023-11-03 DIAGNOSIS — I48.91 ATRIAL FIBRILLATION, UNSPECIFIED TYPE (HCC): ICD-10-CM

## 2023-11-03 DIAGNOSIS — E66.01 MORBID OBESITY (HCC): ICD-10-CM

## 2023-11-03 DIAGNOSIS — D68.69 SECONDARY HYPERCOAGULABLE STATE (HCC): ICD-10-CM

## 2023-11-03 DIAGNOSIS — R00.2 PALPITATIONS: ICD-10-CM

## 2023-11-03 DIAGNOSIS — I25.118 ATHEROSCLEROTIC HEART DISEASE OF NATIVE CORONARY ARTERY WITH OTHER FORMS OF ANGINA PECTORIS (HCC): Primary | ICD-10-CM

## 2023-11-03 DIAGNOSIS — G47.10 HYPERSOMNIA: ICD-10-CM

## 2023-11-03 DIAGNOSIS — R73.03 PREDIABETES: ICD-10-CM

## 2023-11-03 PROCEDURE — 99214 OFFICE O/P EST MOD 30 MIN: CPT | Performed by: INTERNAL MEDICINE

## 2023-11-03 PROCEDURE — 3078F DIAST BP <80 MM HG: CPT | Performed by: INTERNAL MEDICINE

## 2023-11-03 PROCEDURE — 1036F TOBACCO NON-USER: CPT | Performed by: INTERNAL MEDICINE

## 2023-11-03 PROCEDURE — G8428 CUR MEDS NOT DOCUMENT: HCPCS | Performed by: INTERNAL MEDICINE

## 2023-11-03 PROCEDURE — G8484 FLU IMMUNIZE NO ADMIN: HCPCS | Performed by: INTERNAL MEDICINE

## 2023-11-03 PROCEDURE — 1090F PRES/ABSN URINE INCON ASSESS: CPT | Performed by: INTERNAL MEDICINE

## 2023-11-03 PROCEDURE — G8417 CALC BMI ABV UP PARAM F/U: HCPCS | Performed by: INTERNAL MEDICINE

## 2023-11-03 PROCEDURE — G8399 PT W/DXA RESULTS DOCUMENT: HCPCS | Performed by: INTERNAL MEDICINE

## 2023-11-03 PROCEDURE — 3077F SYST BP >= 140 MM HG: CPT | Performed by: INTERNAL MEDICINE

## 2023-11-03 PROCEDURE — 3017F COLORECTAL CA SCREEN DOC REV: CPT | Performed by: INTERNAL MEDICINE

## 2023-11-03 PROCEDURE — 1123F ACP DISCUSS/DSCN MKR DOCD: CPT | Performed by: INTERNAL MEDICINE

## 2023-11-03 RX ORDER — FLECAINIDE ACETATE 50 MG/1
50 TABLET ORAL 2 TIMES DAILY
Qty: 60 TABLET | Refills: 2 | Status: SHIPPED | OUTPATIENT
Start: 2023-11-03

## 2023-11-03 RX ORDER — SEMAGLUTIDE 2.68 MG/ML
2 INJECTION, SOLUTION SUBCUTANEOUS
Qty: 3 ML | Refills: 2 | Status: SHIPPED | OUTPATIENT
Start: 2023-11-03

## 2023-11-03 RX ORDER — SEMAGLUTIDE 1.34 MG/ML
1 INJECTION, SOLUTION SUBCUTANEOUS WEEKLY
Qty: 3 ML | Refills: 5 | Status: SHIPPED | OUTPATIENT
Start: 2023-11-03

## 2023-11-03 ASSESSMENT — ENCOUNTER SYMPTOMS
SPUTUM PRODUCTION: 1
COUGH: 1
EYE PAIN: 0
APHONIA: 0
STRIDOR: 0
NAIL CHANGES: 0
ABDOMINAL PAIN: 0

## 2023-11-03 NOTE — PROGRESS NOTES
AM   Result Value Ref Range    Cholesterol, Total 116 <200 MG/DL    Triglycerides 49 35 - 150 MG/DL    HDL 49 40 - 60 MG/DL    LDL Calculated 57.2 <100 MG/DL    VLDL Cholesterol Calculated 9.8 6.0 - 23.0 MG/DL    Chol/HDL Ratio 2.4     Comprehensive Metabolic Panel    Collection Time: 10/19/23 10:52 AM   Result Value Ref Range    Sodium 142 133 - 143 mmol/L    Potassium 4.0 3.5 - 5.1 mmol/L    Chloride 108 101 - 110 mmol/L    CO2 26 21 - 32 mmol/L    Anion Gap 8 2 - 11 mmol/L    Glucose 89 65 - 100 mg/dL    BUN 11 8 - 23 MG/DL    Creatinine 1.10 (H) 0.6 - 1.0 MG/DL    Est, Glom Filt Rate 55 (L) >60 ml/min/1.73m2    Calcium 9.7 8.3 - 10.4 MG/DL    Total Bilirubin 0.8 0.2 - 1.1 MG/DL    ALT 18 12 - 65 U/L    AST 19 15 - 37 U/L    Alk Phosphatase 93 50 - 136 U/L    Total Protein 7.2 6.3 - 8.2 g/dL    Albumin 3.6 3.2 - 4.6 g/dL    Globulin 3.6 2.8 - 4.5 g/dL    Albumin/Globulin Ratio 1.0 0.4 - 1.6     CBC with Auto Differential    Collection Time: 10/19/23 10:52 AM   Result Value Ref Range    WBC 6.5 4.3 - 11.1 K/uL    RBC 4.09 4.05 - 5.2 M/uL    Hemoglobin 11.8 11.7 - 15.4 g/dL    Hematocrit 36.6 35.8 - 46.3 %    MCV 89.5 82 - 102 FL    MCH 28.9 26.1 - 32.9 PG    MCHC 32.2 31.4 - 35.0 g/dL    RDW 14.4 11.9 - 14.6 %    Platelets 432 705 - 804 K/uL    MPV 11.4 9.4 - 12.3 FL    nRBC 0.00 0.0 - 0.2 K/uL    Differential Type AUTOMATED      Neutrophils % 71 43 - 78 %    Lymphocytes % 21 13 - 44 %    Monocytes % 6 4.0 - 12.0 %    Eosinophils % 1 0.5 - 7.8 %    Basophils % 1 0.0 - 2.0 %    Immature Granulocytes 0 0.0 - 5.0 %    Neutrophils Absolute 4.6 1.7 - 8.2 K/UL    Lymphocytes Absolute 1.4 0.5 - 4.6 K/UL    Monocytes Absolute 0.4 0.1 - 1.3 K/UL    Eosinophils Absolute 0.1 0.0 - 0.8 K/UL    Basophils Absolute 0.1 0.0 - 0.2 K/UL    Absolute Immature Granulocyte 0.0 0.0 - 0.5 K/UL         Lab Results   Component Value Date/Time    CHOL 116 10/19/2023 10:52 AM    HDL 49 10/19/2023 10:52 AM    VLDL 20 03/14/2022 11:11 AM

## 2023-11-13 ENCOUNTER — NURSE ONLY (OUTPATIENT)
Age: 68
End: 2023-11-13
Payer: MEDICARE

## 2023-11-13 ENCOUNTER — OFFICE VISIT (OUTPATIENT)
Age: 68
End: 2023-11-13
Payer: MEDICARE

## 2023-11-13 VITALS
WEIGHT: 174 LBS | HEART RATE: 70 BPM | DIASTOLIC BLOOD PRESSURE: 72 MMHG | BODY MASS INDEX: 28.99 KG/M2 | SYSTOLIC BLOOD PRESSURE: 128 MMHG | HEIGHT: 65 IN

## 2023-11-13 DIAGNOSIS — I10 HTN (HYPERTENSION), BENIGN: ICD-10-CM

## 2023-11-13 DIAGNOSIS — I25.118 ATHEROSCLEROTIC HEART DISEASE OF NATIVE CORONARY ARTERY WITH OTHER FORMS OF ANGINA PECTORIS (HCC): ICD-10-CM

## 2023-11-13 DIAGNOSIS — I12.9 CHRONIC KIDNEY DISEASE DUE TO HYPERTENSION: ICD-10-CM

## 2023-11-13 DIAGNOSIS — I48.3 TYPICAL ATRIAL FLUTTER (HCC): Primary | ICD-10-CM

## 2023-11-13 DIAGNOSIS — E66.01 SEVERE OBESITY (BMI 35.0-39.9) WITH COMORBIDITY (HCC): ICD-10-CM

## 2023-11-13 DIAGNOSIS — J44.89 COPD (CHRONIC OBSTRUCTIVE PULMONARY DISEASE) WITH CHRONIC BRONCHITIS: ICD-10-CM

## 2023-11-13 DIAGNOSIS — G47.33 OSA (OBSTRUCTIVE SLEEP APNEA): ICD-10-CM

## 2023-11-13 DIAGNOSIS — I25.118 ATHEROSCLEROTIC HEART DISEASE OF NATIVE CORONARY ARTERY WITH OTHER FORMS OF ANGINA PECTORIS (HCC): Primary | ICD-10-CM

## 2023-11-13 PROCEDURE — 3078F DIAST BP <80 MM HG: CPT | Performed by: INTERNAL MEDICINE

## 2023-11-13 PROCEDURE — 99205 OFFICE O/P NEW HI 60 MIN: CPT | Performed by: INTERNAL MEDICINE

## 2023-11-13 PROCEDURE — 3074F SYST BP LT 130 MM HG: CPT | Performed by: INTERNAL MEDICINE

## 2023-11-13 PROCEDURE — 3023F SPIROM DOC REV: CPT | Performed by: INTERNAL MEDICINE

## 2023-11-13 PROCEDURE — 1090F PRES/ABSN URINE INCON ASSESS: CPT | Performed by: INTERNAL MEDICINE

## 2023-11-13 PROCEDURE — 1036F TOBACCO NON-USER: CPT | Performed by: INTERNAL MEDICINE

## 2023-11-13 PROCEDURE — G8427 DOCREV CUR MEDS BY ELIG CLIN: HCPCS | Performed by: INTERNAL MEDICINE

## 2023-11-13 PROCEDURE — G8484 FLU IMMUNIZE NO ADMIN: HCPCS | Performed by: INTERNAL MEDICINE

## 2023-11-13 PROCEDURE — G8399 PT W/DXA RESULTS DOCUMENT: HCPCS | Performed by: INTERNAL MEDICINE

## 2023-11-13 PROCEDURE — 3017F COLORECTAL CA SCREEN DOC REV: CPT | Performed by: INTERNAL MEDICINE

## 2023-11-13 PROCEDURE — 93000 ELECTROCARDIOGRAM COMPLETE: CPT | Performed by: INTERNAL MEDICINE

## 2023-11-13 PROCEDURE — 1123F ACP DISCUSS/DSCN MKR DOCD: CPT | Performed by: INTERNAL MEDICINE

## 2023-11-13 PROCEDURE — G8417 CALC BMI ABV UP PARAM F/U: HCPCS | Performed by: INTERNAL MEDICINE

## 2023-11-13 ASSESSMENT — ENCOUNTER SYMPTOMS
SHORTNESS OF BREATH: 1
GASTROINTESTINAL NEGATIVE: 1
EYES NEGATIVE: 1
ALLERGIC/IMMUNOLOGIC NEGATIVE: 1

## 2023-11-14 DIAGNOSIS — I48.3 TYPICAL ATRIAL FLUTTER (HCC): ICD-10-CM

## 2023-11-14 LAB
ANION GAP SERPL CALC-SCNC: 8 MMOL/L (ref 2–11)
BUN SERPL-MCNC: 10 MG/DL (ref 8–23)
CALCIUM SERPL-MCNC: 9.4 MG/DL (ref 8.3–10.4)
CHLORIDE SERPL-SCNC: 107 MMOL/L (ref 101–110)
CO2 SERPL-SCNC: 27 MMOL/L (ref 21–32)
CREAT SERPL-MCNC: 1 MG/DL (ref 0.6–1)
ERYTHROCYTE [DISTWIDTH] IN BLOOD BY AUTOMATED COUNT: 14.6 % (ref 11.9–14.6)
GLUCOSE SERPL-MCNC: 102 MG/DL (ref 65–100)
HCT VFR BLD AUTO: 34.6 % (ref 35.8–46.3)
HGB BLD-MCNC: 11.1 G/DL (ref 11.7–15.4)
INR PPP: 1.2
MCH RBC QN AUTO: 28.7 PG (ref 26.1–32.9)
MCHC RBC AUTO-ENTMCNC: 32.1 G/DL (ref 31.4–35)
MCV RBC AUTO: 89.4 FL (ref 82–102)
NRBC # BLD: 0 K/UL (ref 0–0.2)
PLATELET # BLD AUTO: 273 K/UL (ref 150–450)
PMV BLD AUTO: 10.9 FL (ref 9.4–12.3)
POTASSIUM SERPL-SCNC: 3.9 MMOL/L (ref 3.5–5.1)
PROTHROMBIN TIME: 16.1 SEC (ref 12.6–14.3)
RBC # BLD AUTO: 3.87 M/UL (ref 4.05–5.2)
SODIUM SERPL-SCNC: 142 MMOL/L (ref 133–143)
WBC # BLD AUTO: 8.1 K/UL (ref 4.3–11.1)

## 2023-11-16 NOTE — PROGRESS NOTES
Patient pre-assessment complete for ICD/AV Node with Dr. Sidney Villanueva scheduled for 23 at 21 [phone], arrival time 0800. Patient verified using . Patient instructed to bring a list of home medications on the day of procedure. NPO status reinforced. Patient instructed to follow EP Information Sheet given at appointment. Patient verbalizes understanding of all instructions & denies any questions at this time.

## 2023-11-17 ENCOUNTER — ANESTHESIA (OUTPATIENT)
Dept: CARDIAC CATH/INVASIVE PROCEDURES | Age: 68
End: 2023-11-17
Payer: MEDICARE

## 2023-11-17 ENCOUNTER — ANESTHESIA EVENT (OUTPATIENT)
Dept: CARDIAC CATH/INVASIVE PROCEDURES | Age: 68
End: 2023-11-17
Payer: MEDICARE

## 2023-11-17 ENCOUNTER — HOSPITAL ENCOUNTER (OUTPATIENT)
Age: 68
Setting detail: OUTPATIENT SURGERY
Discharge: HOME | End: 2023-11-17
Attending: INTERNAL MEDICINE | Admitting: INTERNAL MEDICINE
Payer: MEDICARE

## 2023-11-17 ENCOUNTER — APPOINTMENT (OUTPATIENT)
Dept: CARDIAC CATH/INVASIVE PROCEDURES | Age: 68
End: 2023-11-17
Attending: INTERNAL MEDICINE
Payer: MEDICARE

## 2023-11-17 VITALS
RESPIRATION RATE: 15 BRPM | HEART RATE: 86 BPM | BODY MASS INDEX: 28.99 KG/M2 | DIASTOLIC BLOOD PRESSURE: 64 MMHG | TEMPERATURE: 98.1 F | SYSTOLIC BLOOD PRESSURE: 133 MMHG | OXYGEN SATURATION: 97 % | HEIGHT: 65 IN | WEIGHT: 174 LBS

## 2023-11-17 DIAGNOSIS — I48.92 ATRIAL FLUTTER, UNSPECIFIED TYPE (HCC): ICD-10-CM

## 2023-11-17 DIAGNOSIS — I48.91 A-FIB (HCC): ICD-10-CM

## 2023-11-17 LAB
ECHO BSA: 1.9 M2
EKG ATRIAL RATE: 70 BPM
EKG DIAGNOSIS: NORMAL
EKG P AXIS: 36 DEGREES
EKG P-R INTERVAL: 192 MS
EKG Q-T INTERVAL: 400 MS
EKG QRS DURATION: 80 MS
EKG QTC CALCULATION (BAZETT): 432 MS
EKG R AXIS: 27 DEGREES
EKG T AXIS: 52 DEGREES
EKG VENTRICULAR RATE: 70 BPM

## 2023-11-17 PROCEDURE — C1732 CATH, EP, DIAG/ABL, 3D/VECT: HCPCS | Performed by: INTERNAL MEDICINE

## 2023-11-17 PROCEDURE — C1760 CLOSURE DEV, VASC: HCPCS | Performed by: INTERNAL MEDICINE

## 2023-11-17 PROCEDURE — 2500000003 HC RX 250 WO HCPCS: Performed by: NURSE ANESTHETIST, CERTIFIED REGISTERED

## 2023-11-17 PROCEDURE — 93653 COMPRE EP EVAL TX SVT: CPT | Performed by: INTERNAL MEDICINE

## 2023-11-17 PROCEDURE — 93010 ELECTROCARDIOGRAM REPORT: CPT | Performed by: INTERNAL MEDICINE

## 2023-11-17 PROCEDURE — C1759 CATH, INTRA ECHOCARDIOGRAPHY: HCPCS | Performed by: INTERNAL MEDICINE

## 2023-11-17 PROCEDURE — 2709999900 HC NON-CHARGEABLE SUPPLY: Performed by: INTERNAL MEDICINE

## 2023-11-17 PROCEDURE — 6360000002 HC RX W HCPCS: Performed by: NURSE ANESTHETIST, CERTIFIED REGISTERED

## 2023-11-17 PROCEDURE — 93622 COMP EP EVAL L VENTR PAC&REC: CPT | Performed by: INTERNAL MEDICINE

## 2023-11-17 PROCEDURE — 7100000000 HC PACU RECOVERY - FIRST 15 MIN: Performed by: INTERNAL MEDICINE

## 2023-11-17 PROCEDURE — 6370000000 HC RX 637 (ALT 250 FOR IP): Performed by: ANESTHESIOLOGY

## 2023-11-17 PROCEDURE — 3700000000 HC ANESTHESIA ATTENDED CARE: Performed by: INTERNAL MEDICINE

## 2023-11-17 PROCEDURE — C1894 INTRO/SHEATH, NON-LASER: HCPCS | Performed by: INTERNAL MEDICINE

## 2023-11-17 PROCEDURE — 93005 ELECTROCARDIOGRAM TRACING: CPT | Performed by: INTERNAL MEDICINE

## 2023-11-17 PROCEDURE — 7100000001 HC PACU RECOVERY - ADDTL 15 MIN: Performed by: INTERNAL MEDICINE

## 2023-11-17 PROCEDURE — 2580000003 HC RX 258: Performed by: NURSE ANESTHETIST, CERTIFIED REGISTERED

## 2023-11-17 PROCEDURE — 2720000010 HC SURG SUPPLY STERILE: Performed by: INTERNAL MEDICINE

## 2023-11-17 PROCEDURE — 3700000001 HC ADD 15 MINUTES (ANESTHESIA): Performed by: INTERNAL MEDICINE

## 2023-11-17 PROCEDURE — 93662 INTRACARDIAC ECG (ICE): CPT | Performed by: INTERNAL MEDICINE

## 2023-11-17 RX ORDER — LIDOCAINE HYDROCHLORIDE 20 MG/ML
INJECTION, SOLUTION EPIDURAL; INFILTRATION; INTRACAUDAL; PERINEURAL PRN
Status: DISCONTINUED | OUTPATIENT
Start: 2023-11-17 | End: 2023-11-17 | Stop reason: SDUPTHER

## 2023-11-17 RX ORDER — HYDROMORPHONE HYDROCHLORIDE 2 MG/ML
0.5 INJECTION, SOLUTION INTRAMUSCULAR; INTRAVENOUS; SUBCUTANEOUS EVERY 5 MIN PRN
Status: DISCONTINUED | OUTPATIENT
Start: 2023-11-17 | End: 2023-11-17 | Stop reason: HOSPADM

## 2023-11-17 RX ORDER — SODIUM CHLORIDE, SODIUM LACTATE, POTASSIUM CHLORIDE, CALCIUM CHLORIDE 600; 310; 30; 20 MG/100ML; MG/100ML; MG/100ML; MG/100ML
INJECTION, SOLUTION INTRAVENOUS CONTINUOUS
Status: DISCONTINUED | OUTPATIENT
Start: 2023-11-17 | End: 2023-11-17 | Stop reason: HOSPADM

## 2023-11-17 RX ORDER — LIDOCAINE HYDROCHLORIDE 10 MG/ML
1 INJECTION, SOLUTION INFILTRATION; PERINEURAL
Status: CANCELLED | OUTPATIENT
Start: 2023-11-17 | End: 2023-11-18

## 2023-11-17 RX ORDER — SODIUM CHLORIDE 0.9 % (FLUSH) 0.9 %
5-40 SYRINGE (ML) INJECTION EVERY 12 HOURS SCHEDULED
Status: DISCONTINUED | OUTPATIENT
Start: 2023-11-17 | End: 2023-11-17 | Stop reason: HOSPADM

## 2023-11-17 RX ORDER — PROCHLORPERAZINE EDISYLATE 5 MG/ML
5 INJECTION INTRAMUSCULAR; INTRAVENOUS
Status: DISCONTINUED | OUTPATIENT
Start: 2023-11-17 | End: 2023-11-17 | Stop reason: HOSPADM

## 2023-11-17 RX ORDER — ONDANSETRON 2 MG/ML
INJECTION INTRAMUSCULAR; INTRAVENOUS PRN
Status: DISCONTINUED | OUTPATIENT
Start: 2023-11-17 | End: 2023-11-17 | Stop reason: SDUPTHER

## 2023-11-17 RX ORDER — SODIUM CHLORIDE 0.9 % (FLUSH) 0.9 %
5-40 SYRINGE (ML) INJECTION PRN
Status: CANCELLED | OUTPATIENT
Start: 2023-11-17

## 2023-11-17 RX ORDER — SODIUM CHLORIDE 9 MG/ML
INJECTION, SOLUTION INTRAVENOUS PRN
Status: DISCONTINUED | OUTPATIENT
Start: 2023-11-17 | End: 2023-11-17 | Stop reason: HOSPADM

## 2023-11-17 RX ORDER — SODIUM CHLORIDE 0.9 % (FLUSH) 0.9 %
5-40 SYRINGE (ML) INJECTION PRN
Status: DISCONTINUED | OUTPATIENT
Start: 2023-11-17 | End: 2023-11-17 | Stop reason: HOSPADM

## 2023-11-17 RX ORDER — SODIUM CHLORIDE, SODIUM LACTATE, POTASSIUM CHLORIDE, CALCIUM CHLORIDE 600; 310; 30; 20 MG/100ML; MG/100ML; MG/100ML; MG/100ML
INJECTION, SOLUTION INTRAVENOUS CONTINUOUS PRN
Status: DISCONTINUED | OUTPATIENT
Start: 2023-11-17 | End: 2023-11-17 | Stop reason: SDUPTHER

## 2023-11-17 RX ORDER — SODIUM CHLORIDE 0.9 % (FLUSH) 0.9 %
5-40 SYRINGE (ML) INJECTION EVERY 12 HOURS SCHEDULED
Status: CANCELLED | OUTPATIENT
Start: 2023-11-17

## 2023-11-17 RX ORDER — HYDROMORPHONE HYDROCHLORIDE 2 MG/ML
0.5 INJECTION, SOLUTION INTRAMUSCULAR; INTRAVENOUS; SUBCUTANEOUS EVERY 5 MIN PRN
Status: DISCONTINUED | OUTPATIENT
Start: 2023-11-17 | End: 2023-11-17 | Stop reason: SDUPTHER

## 2023-11-17 RX ORDER — ONDANSETRON 2 MG/ML
4 INJECTION INTRAMUSCULAR; INTRAVENOUS
Status: DISCONTINUED | OUTPATIENT
Start: 2023-11-17 | End: 2023-11-17 | Stop reason: HOSPADM

## 2023-11-17 RX ORDER — SODIUM CHLORIDE 9 MG/ML
INJECTION, SOLUTION INTRAVENOUS PRN
Status: CANCELLED | OUTPATIENT
Start: 2023-11-17

## 2023-11-17 RX ORDER — SODIUM CHLORIDE, SODIUM LACTATE, POTASSIUM CHLORIDE, CALCIUM CHLORIDE 600; 310; 30; 20 MG/100ML; MG/100ML; MG/100ML; MG/100ML
INJECTION, SOLUTION INTRAVENOUS CONTINUOUS
Status: CANCELLED | OUTPATIENT
Start: 2023-11-17

## 2023-11-17 RX ORDER — DEXAMETHASONE SODIUM PHOSPHATE 4 MG/ML
INJECTION, SOLUTION INTRA-ARTICULAR; INTRALESIONAL; INTRAMUSCULAR; INTRAVENOUS; SOFT TISSUE PRN
Status: DISCONTINUED | OUTPATIENT
Start: 2023-11-17 | End: 2023-11-17 | Stop reason: SDUPTHER

## 2023-11-17 RX ORDER — OXYCODONE HYDROCHLORIDE 5 MG/1
10 TABLET ORAL PRN
Status: COMPLETED | OUTPATIENT
Start: 2023-11-17 | End: 2023-11-17

## 2023-11-17 RX ORDER — OXYCODONE HYDROCHLORIDE 5 MG/1
5 TABLET ORAL PRN
Status: COMPLETED | OUTPATIENT
Start: 2023-11-17 | End: 2023-11-17

## 2023-11-17 RX ORDER — ROCURONIUM BROMIDE 10 MG/ML
INJECTION, SOLUTION INTRAVENOUS PRN
Status: DISCONTINUED | OUTPATIENT
Start: 2023-11-17 | End: 2023-11-17 | Stop reason: SDUPTHER

## 2023-11-17 RX ORDER — PROPOFOL 10 MG/ML
INJECTION, EMULSION INTRAVENOUS PRN
Status: DISCONTINUED | OUTPATIENT
Start: 2023-11-17 | End: 2023-11-17 | Stop reason: SDUPTHER

## 2023-11-17 RX ORDER — DIPHENHYDRAMINE HYDROCHLORIDE 50 MG/ML
12.5 INJECTION INTRAMUSCULAR; INTRAVENOUS
Status: DISCONTINUED | OUTPATIENT
Start: 2023-11-17 | End: 2023-11-17 | Stop reason: HOSPADM

## 2023-11-17 RX ADMIN — PROPOFOL 200 MG: 10 INJECTION, EMULSION INTRAVENOUS at 10:21

## 2023-11-17 RX ADMIN — SUGAMMADEX 200 MG: 100 INJECTION, SOLUTION INTRAVENOUS at 11:20

## 2023-11-17 RX ADMIN — ROCURONIUM BROMIDE 40 MG: 50 INJECTION, SOLUTION INTRAVENOUS at 10:21

## 2023-11-17 RX ADMIN — SODIUM CHLORIDE, SODIUM LACTATE, POTASSIUM CHLORIDE, AND CALCIUM CHLORIDE: 600; 310; 30; 20 INJECTION, SOLUTION INTRAVENOUS at 10:12

## 2023-11-17 RX ADMIN — LIDOCAINE HYDROCHLORIDE 60 MG: 20 INJECTION, SOLUTION EPIDURAL; INFILTRATION; INTRACAUDAL; PERINEURAL at 10:21

## 2023-11-17 RX ADMIN — PHENYLEPHRINE HYDROCHLORIDE 100 MCG: 10 INJECTION INTRAVENOUS at 10:30

## 2023-11-17 RX ADMIN — DEXAMETHASONE SODIUM PHOSPHATE 4 MG: 4 INJECTION INTRA-ARTICULAR; INTRALESIONAL; INTRAMUSCULAR; INTRAVENOUS; SOFT TISSUE at 10:41

## 2023-11-17 RX ADMIN — OXYCODONE 10 MG: 5 TABLET ORAL at 11:48

## 2023-11-17 RX ADMIN — ONDANSETRON 4 MG: 2 INJECTION INTRAMUSCULAR; INTRAVENOUS at 10:41

## 2023-11-17 RX ADMIN — ROCURONIUM BROMIDE 10 MG: 50 INJECTION, SOLUTION INTRAVENOUS at 10:38

## 2023-11-17 ASSESSMENT — PAIN - FUNCTIONAL ASSESSMENT
PAIN_FUNCTIONAL_ASSESSMENT: 0-10
PAIN_FUNCTIONAL_ASSESSMENT: 0-10

## 2023-11-17 ASSESSMENT — PAIN SCALES - GENERAL: PAINLEVEL_OUTOF10: 8

## 2023-11-17 ASSESSMENT — COPD QUESTIONNAIRES: CAT_SEVERITY: MODERATE

## 2023-11-17 ASSESSMENT — PAIN DESCRIPTION - LOCATION: LOCATION: BACK;GROIN

## 2023-11-17 ASSESSMENT — PAIN DESCRIPTION - ORIENTATION: ORIENTATION: RIGHT;LOWER

## 2023-11-17 NOTE — PROCEDURES
: Rajni Mullen. Gareth Victoria MD    REFERRING: Michael Duong. Royce Vega MD    Pre-Electrophysiology Diagnosis  1. Typical atrial flutter. Procedure Performed  1. Electrophysiology testing with right-sided atrial flutter ablation. 2. Left atrial pacing recording from the coronary sinus. 3. 3-D Electroanatomical mapping  4. Intracardiac echocardiography    Anesthesia: Monitor Anesthesia Care     Estimated Blood Loss: Less than 10 mL     Specimens: * No specimens in log *    Complications: None    Fluoroscopy Time: 0 minutes     Procedure in Detail:  The patient was brought to the electrophysiology lab in the fasting state. A Ref-Star CARTO patch was placed, the patient was then prepped and draped in sterile fashion. Venous access was then obtained x3 using modified Seldinger technique under ultrasound guidance, with placement of two 8 Fr short sidearm sheaths into the right femoral vein and one 10 Fr short arm sidearm sheath in the left femoral vein. A 10 Fr SoundStar ICE catheter was advanced through the 10 Fr short sheath and advanced into the right atrium to obtain pertinent RA geometry, create a 3D map and to help guide ablation and catheter movements. A 3.5 mm Biosense Sosa porous irrigated STSF ablation catheter Leo Albright) was inserted into one of the 8 Fr sheaths and was used to create a 3D electroanatomical map of the right atrium focusing on the cavotricuspid isthmus and into the coronary sinus. The ablation catheter was used to record intracardiac electrograms along the lateral wall of the right atrium and the His electrogram. A decapolar catheter was then inserted via an 8 Fr sheath and positioned in the mid coronary sinus. The patient presented to the EP lab in normal sinus rhythm with previous ECG consistent with typical appearing atrial flutter. Linear ablation across the cavo-tricuspid isthmus was performed starting with 1:2 A:V EGMs along the isthmus at 6pm Korean.  During delivery of RF, there was a change in the EGM sequence consistent with unidirectional block. The local electrogram activation sequence, differential pacing maneuvers and electrogram timing was used to demonstrate bidirectional block along the cavotricuspid isthmus with further ablation. Local double potential atrial electrograms: 75 msec  Trans-isthmus time post ablation: 125 msec    The coronary sinus multipolar catheter was used to pace the left atrium during the EP study. The LA CS electrograms were documented and interpreted during the procedure. A comprehensive EP study was performed with 1:1 AV decremental pacing, atrial extrastimuli and ventricular pacing to assess retrograde conduction. The patient did not have sustained slow pathway conduction or evidence of an accessory pathway. Ventricular pacing revealed retrograde VA conduction which was concentric and decremental.    Baseline Intervals  QRS duration: 96 msec  QT interval: 368 msec   NJ interval: 187 msec  RR interval: 711 msec  AH interval: 53 msec  HV interval: 43 msec    EP Study  AV Wenchebach: 380 msec  VA Wenchebach: 400 msec    At the completion of the final comprehensive EP study and all catheters were removed. Three VASCADE MVP devices were used to close the venotomy sites. The MVP tools were advanced into the 8 Fr and 10 Fr sheaths, respectively; the sheaths were then removed. The collagen was then deployed and a 30 second dwell time was performed to allow for expansion of the collagen. The delivery tools were then removed with adequate hemostasis. The patient tolerated the procedure well with no acute complications recognized. A post procedure ICE image was performed prior to the patient being transported off the table and was negative for a pericardial effusion. Plan of care:  The patient will be placed in observation on telemetry, 3-4 hour flat time, followed by ambulation as tolerated and will continue anticoagulation as prescribed

## 2023-11-17 NOTE — PROGRESS NOTES
Patient received to [facility] room # 9  Ambulatory from [facility]. Patient scheduled for Aflutter ablation today with Dr Ishmael Resendiz. Procedure reviewed & questions answered, voiced good understanding consent obtained & placed on chart. All medications and medical history reviewed. Will prep patient per orders. Patient & family updated on plan of care. The patient has a fraility score of 3-MANAGING WELL, based on ability to perform ADLS by self.

## 2023-11-17 NOTE — DISCHARGE INSTRUCTIONS
DISCHARGE INSTRUCTIONS    Check puncture site frequently for swelling or bleeding. If there is any bleeding, apply pressure over the area with a clean towel or washcloth and call 911. Notify your doctor for any redness, swelling, drainage, or oozing from the puncture site. Notify your doctor for any fever or chills. If the extremity becomes cold, numb, or painful call [facility]iology at [phone]. Activity should be limited for the next 48 hours. No heavy lifting, pushing, pulling  or strenuous activity for 48 hours. No heavy lifting (anything over 10 pounds) for 3 days. You may resume your usual diet. Drink more fluids than usual.  Have a responsible person drive you home and stay with you for at least 24 hours after your heart catheterization/angiography. You may remove bandage from your right and left groins in 24 hours. You may shower in 24 hours. No tub baths, hot tubs, or swimming for 1 week. Do not place any lotions, creams, powders, or ointments over puncture site for 1 week. You may place a clean band-aid over the puncture site each day for 5 days. Change daily. Sedation for a Medical Procedure: Care Instructions     You were given a sedative medication during your visit. While many of the effects will have worn   off before you leave; you may continue to feel some effects for several hours. Common side effects from sedation include:  Feeling sleepy. (Your doctors and nurses will make sure you are not too sleepy to go home.)  Nausea and vomiting. This usually does not last long. Feeling tired. How can you care for yourself at home? Activity    Don't do anything for 24 hours that requires attention to detail. It takes time for the medicine effects to completely wear off. Do not make important legal decisions for 24 hours. Do not sign any legal documents for 24 hours.      Do not drink alcohol today     For your safety, you should not drive or operate heavy machinery for the remainder of the day     Rest when you feel tired. Getting enough sleep will help you recover. Diet    You can eat your normal diet, unless your doctor gives you other instructions. If your stomach is upset, try clear liquids and bland, low-fat foods like plain toast or rice. Drink plenty of fluids (unless your doctor tells you not to). Don't drink alcohol for 24 hours. Medicines    Be safe with medicines. Read and follow all instructions on the label. If the doctor gave you a prescription medicine for pain, take it as prescribed. If you are not taking a prescription pain medicine, ask your doctor if you can take an over-the-counter medicine. If you think your pain medicine is making you sick to your stomach: Take your medicine after meals (unless your doctor has told you not to). Ask your doctor for a different pain medicine. I have read the above instructions and have had the opportunity to ask questions.       Patient: ________________________   Date: _____________    Witness: _______________________   Date: _____________

## 2023-11-17 NOTE — PROGRESS NOTES
TRANSFER - IN REPORT:    Verbal report received from PACU on Toya Crowe  being received from PACU for routine progression of patient care      Report consisted of patient's Situation, Background, Assessment and   Recommendations(SBAR). Information from the following report(s) Nurse Handoff Report was reviewed with the receiving nurse. Opportunity for questions and clarification was provided. Assessment completed upon patient's arrival to unit and care assumed. For information on Fall & Injury Prevention, visit [email or web address]

## 2023-11-30 ENCOUNTER — TELEPHONE (OUTPATIENT)
Age: 68
End: 2023-11-30

## 2023-11-30 NOTE — TELEPHONE ENCOUNTER
LVM for patient to return call to schedule f/u appointment with Dr. Damian Hinton per [facility]

## 2023-12-05 ENCOUNTER — TELEPHONE (OUTPATIENT)
Age: 68
End: 2023-12-05

## 2023-12-05 NOTE — TELEPHONE ENCOUNTER
Pt LVM on procedure scheduling line regarding rescheduling an appt with Dr. Gonzalez. Please call and advise.

## 2023-12-29 DIAGNOSIS — E03.9 PRIMARY HYPOTHYROIDISM: ICD-10-CM

## 2023-12-29 DIAGNOSIS — G47.01 INSOMNIA DUE TO MEDICAL CONDITION: ICD-10-CM

## 2023-12-29 NOTE — TELEPHONE ENCOUNTER
Pt called and stated that Dr. Tomi Beth has released her and stated that you would be able to take over the refill of the following medication:    Levothyroxine (Synthroid) 88 MCG tablet  Take 1 tablet by mouth every morning before breakfast    Please use Tuee.     Thank you

## 2023-12-31 RX ORDER — LEVOTHYROXINE SODIUM 88 UG/1
88 TABLET ORAL
Qty: 90 TABLET | Refills: 3 | Status: SHIPPED | OUTPATIENT
Start: 2023-12-31

## 2023-12-31 RX ORDER — ZOLPIDEM TARTRATE 10 MG/1
10 TABLET ORAL NIGHTLY
Qty: 30 TABLET | Refills: 2 | Status: SHIPPED | OUTPATIENT
Start: 2023-12-31 | End: 2024-03-30

## 2024-01-04 DIAGNOSIS — E66.01 SEVERE OBESITY (BMI 35.0-39.9) WITH COMORBIDITY (HCC): Primary | ICD-10-CM

## 2024-01-04 NOTE — TELEPHONE ENCOUNTER
Patient called and wanted to talk to Dr. Joseph. Her Insurance will not pay for Ozempic any longer. She wants to be called asap. She reiterated several times she needs to speak to doctor.

## 2024-01-04 NOTE — TELEPHONE ENCOUNTER
Called pt and inquired about ozempic. Pt stated that she does not have dx of type 2 diabetes. Pt stated that she would be willing to change to an alternative medication to reduce the risk of stroke such as zepbound, mounjaro, or another one that she'd qualify for. Let pt know that I would discuss options with Dr. Joseph and call her back. Pt voiced understanding.

## 2024-01-05 NOTE — TELEPHONE ENCOUNTER
Okay to prescribe Zepbound  Starting at 2.5 mg weekly please let patient know that most of the time these medications are not covered if there is no diagnosis of diabetes.

## 2024-01-11 NOTE — TELEPHONE ENCOUNTER
Requested Prescriptions     Pending Prescriptions Disp Refills    Tirzepatide-Weight Management (ZEPBOUND) 2.5 MG/0.5ML SOAJ 2 mL 0     Sig: Inject 2.5 mg into the skin once a week

## 2024-01-12 ENCOUNTER — TELEPHONE (OUTPATIENT)
Age: 69
End: 2024-01-12

## 2024-01-12 RX ORDER — TIRZEPATIDE 2.5 MG/.5ML
2.5 INJECTION, SOLUTION SUBCUTANEOUS WEEKLY
Qty: 2 ML | Refills: 0 | Status: SHIPPED | OUTPATIENT
Start: 2024-01-12

## 2024-01-23 ENCOUNTER — TELEPHONE (OUTPATIENT)
Dept: FAMILY MEDICINE CLINIC | Facility: CLINIC | Age: 69
End: 2024-01-23

## 2024-01-23 ENCOUNTER — OFFICE VISIT (OUTPATIENT)
Dept: FAMILY MEDICINE CLINIC | Facility: CLINIC | Age: 69
End: 2024-01-23
Payer: MEDICARE

## 2024-01-23 VITALS
SYSTOLIC BLOOD PRESSURE: 132 MMHG | DIASTOLIC BLOOD PRESSURE: 74 MMHG | RESPIRATION RATE: 17 BRPM | TEMPERATURE: 97.6 F | BODY MASS INDEX: 27.99 KG/M2 | OXYGEN SATURATION: 97 % | HEIGHT: 65 IN | WEIGHT: 168 LBS | HEART RATE: 82 BPM

## 2024-01-23 DIAGNOSIS — Z12.31 ENCOUNTER FOR SCREENING MAMMOGRAM FOR MALIGNANT NEOPLASM OF BREAST: ICD-10-CM

## 2024-01-23 DIAGNOSIS — E83.42 HYPOMAGNESEMIA: ICD-10-CM

## 2024-01-23 DIAGNOSIS — E78.49 OTHER HYPERLIPIDEMIA: ICD-10-CM

## 2024-01-23 DIAGNOSIS — J44.9 CHRONIC OBSTRUCTIVE PULMONARY DISEASE, UNSPECIFIED COPD TYPE (HCC): ICD-10-CM

## 2024-01-23 DIAGNOSIS — I25.118 ATHEROSCLEROTIC HEART DISEASE OF NATIVE CORONARY ARTERY WITH OTHER FORMS OF ANGINA PECTORIS (HCC): ICD-10-CM

## 2024-01-23 DIAGNOSIS — E03.9 PRIMARY HYPOTHYROIDISM: ICD-10-CM

## 2024-01-23 DIAGNOSIS — G47.01 INSOMNIA DUE TO MEDICAL CONDITION: ICD-10-CM

## 2024-01-23 DIAGNOSIS — I10 PRIMARY HYPERTENSION: Primary | ICD-10-CM

## 2024-01-23 DIAGNOSIS — R73.09 ABNORMAL GLUCOSE: ICD-10-CM

## 2024-01-23 DIAGNOSIS — E03.8 OTHER SPECIFIED HYPOTHYROIDISM: ICD-10-CM

## 2024-01-23 DIAGNOSIS — I48.3 TYPICAL ATRIAL FLUTTER (HCC): ICD-10-CM

## 2024-01-23 DIAGNOSIS — N18.31 STAGE 3A CHRONIC KIDNEY DISEASE (HCC): ICD-10-CM

## 2024-01-23 DIAGNOSIS — I48.91 ATRIAL FIBRILLATION, UNSPECIFIED TYPE (HCC): ICD-10-CM

## 2024-01-23 PROBLEM — D68.69 SECONDARY HYPERCOAGULABLE STATE (HCC): Status: RESOLVED | Noted: 2023-11-03 | Resolved: 2024-01-23

## 2024-01-23 LAB
ALBUMIN SERPL-MCNC: 3.7 G/DL (ref 3.2–4.6)
ALBUMIN/GLOB SERPL: 0.9 (ref 0.4–1.6)
ALP SERPL-CCNC: 103 U/L (ref 50–136)
ALT SERPL-CCNC: 16 U/L (ref 12–65)
ANION GAP SERPL CALC-SCNC: 3 MMOL/L (ref 2–11)
AST SERPL-CCNC: 15 U/L (ref 15–37)
BASOPHILS # BLD: 0.1 K/UL (ref 0–0.2)
BASOPHILS NFR BLD: 1 % (ref 0–2)
BILIRUB SERPL-MCNC: 0.6 MG/DL (ref 0.2–1.1)
BUN SERPL-MCNC: 13 MG/DL (ref 8–23)
CALCIUM SERPL-MCNC: 10 MG/DL (ref 8.3–10.4)
CHLORIDE SERPL-SCNC: 108 MMOL/L (ref 103–113)
CHOLEST SERPL-MCNC: 142 MG/DL
CO2 SERPL-SCNC: 28 MMOL/L (ref 21–32)
CREAT SERPL-MCNC: 1 MG/DL (ref 0.6–1)
DIFFERENTIAL METHOD BLD: ABNORMAL
EOSINOPHIL # BLD: 0.1 K/UL (ref 0–0.8)
EOSINOPHIL NFR BLD: 1 % (ref 0.5–7.8)
ERYTHROCYTE [DISTWIDTH] IN BLOOD BY AUTOMATED COUNT: 13.8 % (ref 11.9–14.6)
EST. AVERAGE GLUCOSE BLD GHB EST-MCNC: 117 MG/DL
GLOBULIN SER CALC-MCNC: 3.9 G/DL (ref 2.8–4.5)
GLUCOSE SERPL-MCNC: 101 MG/DL (ref 65–100)
HBA1C MFR BLD: 5.7 % (ref 4.8–5.6)
HCT VFR BLD AUTO: 37.5 % (ref 35.8–46.3)
HDLC SERPL-MCNC: 55 MG/DL (ref 40–60)
HDLC SERPL: 2.6
HGB BLD-MCNC: 11.7 G/DL (ref 11.7–15.4)
IMM GRANULOCYTES # BLD AUTO: 0 K/UL (ref 0–0.5)
IMM GRANULOCYTES NFR BLD AUTO: 0 % (ref 0–5)
LDLC SERPL CALC-MCNC: 71 MG/DL
LYMPHOCYTES # BLD: 1.3 K/UL (ref 0.5–4.6)
LYMPHOCYTES NFR BLD: 18 % (ref 13–44)
MAGNESIUM SERPL-MCNC: 2.2 MG/DL (ref 1.8–2.4)
MCH RBC QN AUTO: 27.7 PG (ref 26.1–32.9)
MCHC RBC AUTO-ENTMCNC: 31.2 G/DL (ref 31.4–35)
MCV RBC AUTO: 88.9 FL (ref 82–102)
MONOCYTES # BLD: 0.5 K/UL (ref 0.1–1.3)
MONOCYTES NFR BLD: 6 % (ref 4–12)
NEUTS SEG # BLD: 5.5 K/UL (ref 1.7–8.2)
NEUTS SEG NFR BLD: 74 % (ref 43–78)
NRBC # BLD: 0 K/UL (ref 0–0.2)
PLATELET # BLD AUTO: 312 K/UL (ref 150–450)
PMV BLD AUTO: 10.6 FL (ref 9.4–12.3)
POTASSIUM SERPL-SCNC: 4.3 MMOL/L (ref 3.5–5.1)
PROT SERPL-MCNC: 7.6 G/DL (ref 6.3–8.2)
RBC # BLD AUTO: 4.22 M/UL (ref 4.05–5.2)
SODIUM SERPL-SCNC: 139 MMOL/L (ref 136–146)
TRIGL SERPL-MCNC: 80 MG/DL (ref 35–150)
TSH, 3RD GENERATION: 0.27 UIU/ML (ref 0.36–3.74)
VLDLC SERPL CALC-MCNC: 16 MG/DL (ref 6–23)
WBC # BLD AUTO: 7.4 K/UL (ref 4.3–11.1)

## 2024-01-23 PROCEDURE — 3078F DIAST BP <80 MM HG: CPT | Performed by: FAMILY MEDICINE

## 2024-01-23 PROCEDURE — G8484 FLU IMMUNIZE NO ADMIN: HCPCS | Performed by: FAMILY MEDICINE

## 2024-01-23 PROCEDURE — 1090F PRES/ABSN URINE INCON ASSESS: CPT | Performed by: FAMILY MEDICINE

## 2024-01-23 PROCEDURE — 99214 OFFICE O/P EST MOD 30 MIN: CPT | Performed by: FAMILY MEDICINE

## 2024-01-23 PROCEDURE — 1123F ACP DISCUSS/DSCN MKR DOCD: CPT | Performed by: FAMILY MEDICINE

## 2024-01-23 PROCEDURE — 3023F SPIROM DOC REV: CPT | Performed by: FAMILY MEDICINE

## 2024-01-23 PROCEDURE — G8417 CALC BMI ABV UP PARAM F/U: HCPCS | Performed by: FAMILY MEDICINE

## 2024-01-23 PROCEDURE — 3017F COLORECTAL CA SCREEN DOC REV: CPT | Performed by: FAMILY MEDICINE

## 2024-01-23 PROCEDURE — 3075F SYST BP GE 130 - 139MM HG: CPT | Performed by: FAMILY MEDICINE

## 2024-01-23 PROCEDURE — 1036F TOBACCO NON-USER: CPT | Performed by: FAMILY MEDICINE

## 2024-01-23 PROCEDURE — G8399 PT W/DXA RESULTS DOCUMENT: HCPCS | Performed by: FAMILY MEDICINE

## 2024-01-23 PROCEDURE — G8427 DOCREV CUR MEDS BY ELIG CLIN: HCPCS | Performed by: FAMILY MEDICINE

## 2024-01-23 RX ORDER — ZOLPIDEM TARTRATE 10 MG/1
10 TABLET ORAL NIGHTLY
Qty: 30 TABLET | Refills: 2 | Status: SHIPPED | OUTPATIENT
Start: 2024-01-23 | End: 2024-04-22

## 2024-01-23 RX ORDER — LEVOTHYROXINE SODIUM 88 UG/1
88 TABLET ORAL
Qty: 90 TABLET | Refills: 3 | Status: SHIPPED | OUTPATIENT
Start: 2024-01-23

## 2024-01-23 ASSESSMENT — ENCOUNTER SYMPTOMS
COUGH: 0
EYE PAIN: 0
COLOR CHANGE: 0
SHORTNESS OF BREATH: 0
PHOTOPHOBIA: 0
SORE THROAT: 0
ABDOMINAL DISTENTION: 0
BLOOD IN STOOL: 0
NAUSEA: 0
ABDOMINAL PAIN: 0
BLURRED VISION: 0

## 2024-01-23 ASSESSMENT — PATIENT HEALTH QUESTIONNAIRE - PHQ9
1. LITTLE INTEREST OR PLEASURE IN DOING THINGS: 0
SUM OF ALL RESPONSES TO PHQ QUESTIONS 1-9: 0
SUM OF ALL RESPONSES TO PHQ9 QUESTIONS 1 & 2: 0
SUM OF ALL RESPONSES TO PHQ QUESTIONS 1-9: 0
2. FEELING DOWN, DEPRESSED OR HOPELESS: 0
SUM OF ALL RESPONSES TO PHQ QUESTIONS 1-9: 0
SUM OF ALL RESPONSES TO PHQ QUESTIONS 1-9: 0

## 2024-01-23 NOTE — PROGRESS NOTES
Lachelle Quintero  1955 is a 68 y.o. female ,Established patient, here for evaluation of the following chief complaint(s):   Chief Complaint   Patient presents with    Follow-up     3 month. Patient is fasting today          1. Primary hypertension  -     CBC with Auto Differential; Future  -     Comprehensive Metabolic Panel; Future  2. Other hyperlipidemia  -     Lipid Panel; Future  3. Other specified hypothyroidism  -     TSH; Future  4. Abnormal glucose  -     Hemoglobin A1C; Future  5. Atrial fibrillation, unspecified type (HCC)  -     CBC with Auto Differential; Future  -     Comprehensive Metabolic Panel; Future  6. Atherosclerotic heart disease of native coronary artery with other forms of angina pectoris (HCC)  -     CBC with Auto Differential; Future  -     Comprehensive Metabolic Panel; Future  7. Stage 3a chronic kidney disease (HCC)  -     CBC with Auto Differential; Future  -     Comprehensive Metabolic Panel; Future  8. Insomnia due to medical condition  -     zolpidem (AMBIEN) 10 MG tablet; Take 1 tablet by mouth at bedtime for 90 days. Max Daily Amount: 10 mg, Disp-30 tablet, R-2Normal  9. Primary hypothyroidism  -     levothyroxine (SYNTHROID) 88 MCG tablet; Take 1 tablet by mouth every morning (before breakfast), Disp-90 tablet, R-3Normal  10. Hypomagnesemia  -     Magnesium; Future  11. Encounter for screening mammogram for malignant neoplasm of breast  -     GERMÁN JEAN DIGITAL SCREEN BILATERAL; Future  12. Chronic obstructive pulmonary disease, unspecified COPD type (HCC)  Assessment & Plan:   Monitored by specialist- no acute findings meriting change in the plan  13. Typical atrial flutter (HCC)  Assessment & Plan:   Monitored by specialist- no acute findings meriting change in the plan--SHE HAD ABLATION LAST MONTH PER CARDIOLOGY        Return in about 3 months (around 4/23/2024).        Subjective   SUBJECTIVE/OBJECTIVE:  Hypertension  This is a chronic problem. The current episode started

## 2024-01-23 NOTE — ASSESSMENT & PLAN NOTE
Monitored by specialist- no acute findings meriting change in the plan--SHE HAD ABLATION LAST MONTH PER CARDIOLOGY

## 2024-01-31 RX ORDER — NEBIVOLOL 10 MG/1
10 TABLET ORAL DAILY
Qty: 90 TABLET | Refills: 0 | Status: SHIPPED | OUTPATIENT
Start: 2024-01-31 | End: 2024-04-30

## 2024-01-31 RX ORDER — ATORVASTATIN CALCIUM 40 MG/1
40 TABLET, FILM COATED ORAL DAILY
Qty: 90 TABLET | Refills: 1 | Status: SHIPPED | OUTPATIENT
Start: 2024-01-31 | End: 2024-07-29

## 2024-01-31 NOTE — TELEPHONE ENCOUNTER
“Verified rx in last OV date 11/03/2023. Pharmacy confirmed. Erx as requested”.    Requested Prescriptions     Pending Prescriptions Disp Refills    nebivolol (BYSTOLIC) 10 MG tablet [Pharmacy Med Name: NEBIVOLOL HCL 10 MG TABS 10 Tablet] 90 tablet 1     Sig: TAKE 1 TABLET BY MOUTH DAILY    atorvastatin (LIPITOR) 40 MG tablet [Pharmacy Med Name: ATORVASTATIN CALCIUM 40 MG 40 Tablet] 90 tablet 1     Sig: TAKE 1 TABLET BY MOUTH DAILY

## 2024-03-08 ENCOUNTER — OFFICE VISIT (OUTPATIENT)
Age: 69
End: 2024-03-08
Payer: MEDICARE

## 2024-03-08 VITALS
WEIGHT: 169 LBS | BODY MASS INDEX: 28.16 KG/M2 | SYSTOLIC BLOOD PRESSURE: 146 MMHG | HEIGHT: 65 IN | DIASTOLIC BLOOD PRESSURE: 68 MMHG | HEART RATE: 68 BPM

## 2024-03-08 DIAGNOSIS — I25.10 CORONARY ARTERY DISEASE INVOLVING NATIVE CORONARY ARTERY OF NATIVE HEART WITHOUT ANGINA PECTORIS: ICD-10-CM

## 2024-03-08 DIAGNOSIS — I10 HTN (HYPERTENSION), BENIGN: Primary | ICD-10-CM

## 2024-03-08 DIAGNOSIS — G47.10 HYPERSOMNOLENCE: ICD-10-CM

## 2024-03-08 DIAGNOSIS — E11.65 TYPE 2 DIABETES MELLITUS WITH HYPERGLYCEMIA, WITHOUT LONG-TERM CURRENT USE OF INSULIN (HCC): ICD-10-CM

## 2024-03-08 PROCEDURE — 3044F HG A1C LEVEL LT 7.0%: CPT | Performed by: INTERNAL MEDICINE

## 2024-03-08 PROCEDURE — 3017F COLORECTAL CA SCREEN DOC REV: CPT | Performed by: INTERNAL MEDICINE

## 2024-03-08 PROCEDURE — G8417 CALC BMI ABV UP PARAM F/U: HCPCS | Performed by: INTERNAL MEDICINE

## 2024-03-08 PROCEDURE — 99214 OFFICE O/P EST MOD 30 MIN: CPT | Performed by: INTERNAL MEDICINE

## 2024-03-08 PROCEDURE — G8399 PT W/DXA RESULTS DOCUMENT: HCPCS | Performed by: INTERNAL MEDICINE

## 2024-03-08 PROCEDURE — 1090F PRES/ABSN URINE INCON ASSESS: CPT | Performed by: INTERNAL MEDICINE

## 2024-03-08 PROCEDURE — G8428 CUR MEDS NOT DOCUMENT: HCPCS | Performed by: INTERNAL MEDICINE

## 2024-03-08 PROCEDURE — 2022F DILAT RTA XM EVC RTNOPTHY: CPT | Performed by: INTERNAL MEDICINE

## 2024-03-08 PROCEDURE — 1123F ACP DISCUSS/DSCN MKR DOCD: CPT | Performed by: INTERNAL MEDICINE

## 2024-03-08 PROCEDURE — 3078F DIAST BP <80 MM HG: CPT | Performed by: INTERNAL MEDICINE

## 2024-03-08 PROCEDURE — 3077F SYST BP >= 140 MM HG: CPT | Performed by: INTERNAL MEDICINE

## 2024-03-08 PROCEDURE — G8484 FLU IMMUNIZE NO ADMIN: HCPCS | Performed by: INTERNAL MEDICINE

## 2024-03-08 PROCEDURE — 1036F TOBACCO NON-USER: CPT | Performed by: INTERNAL MEDICINE

## 2024-03-08 RX ORDER — SEMAGLUTIDE 2.68 MG/ML
2 INJECTION, SOLUTION SUBCUTANEOUS
Qty: 3 ML | Refills: 12 | Status: SHIPPED | OUTPATIENT
Start: 2024-03-08

## 2024-03-08 ASSESSMENT — ENCOUNTER SYMPTOMS
SPUTUM PRODUCTION: 1
ABDOMINAL PAIN: 0
NAIL CHANGES: 0
COUGH: 1
APHONIA: 0
EYE PAIN: 0
STRIDOR: 0

## 2024-03-08 NOTE — PROGRESS NOTES
[facility], [address], [address]  [place] [address]  PHONE: [phone]    SUBJECTIVE:   Lachelle Quintero is a 69 y.o. female 1955   seen for a follow up visit regarding the following:     Chief Complaint   Patient presents with    Coronary Artery Disease     History of present illness: 69 y.o. female presented for follow-up 3/8/24 atrial flutter ablation since her last appointment.  Occasional palpitations    Interval Hx:   Patient is very concerned with CAD due to family with history of MI. Back surgery pain no better. Tearful today. Pain.      The patient had a previous history of hypertension, as well as nonobstructive coronary artery disease.  She presented off schedule 04/27/2018.  Unfortunately, she had an episode of angioedema that occurred associated with ACE inhibitor therapy.  The patient  was seen in [facility] Emergency Department.  She was given steroids, as well as Benadryl with resolution.  Her Lisinopril was discontinued.        She presented 11/30/2018 scheduled back surgery possibly due to bulging disc.      Cardiac history:    Cardiac catheterization was in 2010 at [facility].     2015 with stress perfusion imaging that was normal.    Cardiac catheterization 5/11/2016 mild nonobstructive coronary disease with moderate diffuse calcifications noted.   left main angiographically normal, LAD mild nonobstructive disease with focal stenosis at the origin in the first diagonal branch, 20%  narrowing at the circumflex, small low grade calcification luminal irregularities, right coronary and large PDA with 30% stenosis.      Ranexa initiated 12/2016 with improvement of some symptoms patient discontinued.    CT abdomen with normal adrenal glands.    12/2017 [facility] total cholesterol 116, triglycerides 70, HDL 50, LDL 52, VLDL 14.    04/2018 angioedema on ACE inhibitor.    04/27/2018 Metoprolol was changed to Bystolic.  Initiated on

## 2024-03-22 DIAGNOSIS — R06.83 SNORING: Primary | ICD-10-CM

## 2024-03-25 ENCOUNTER — TELEPHONE (OUTPATIENT)
Age: 69
End: 2024-03-25

## 2024-03-25 DIAGNOSIS — I48.3 TYPICAL ATRIAL FLUTTER (HCC): Primary | ICD-10-CM

## 2024-03-25 NOTE — TELEPHONE ENCOUNTER
Patient states that at her appointment last week with Dr. oJseph they talked about her wearing a monitor.  She is calling now to schedule that, but I do not see an order.  Can you please have him put in the order, and forward to scheduling to get it scheduled.

## 2024-03-27 NOTE — TELEPHONE ENCOUNTER
Pt had monitor placed in [place] office and scheduled for follow up 5/7/2024 with Dr Gonzalez. Will follow up and advise sooner if needed once monitor results received.

## 2024-04-05 ENCOUNTER — TELEPHONE (OUTPATIENT)
Age: 69
End: 2024-04-05

## 2024-04-08 ENCOUNTER — OFFICE VISIT (OUTPATIENT)
Dept: FAMILY MEDICINE CLINIC | Facility: CLINIC | Age: 69
End: 2024-04-08
Payer: MEDICARE

## 2024-04-08 ENCOUNTER — TELEPHONE (OUTPATIENT)
Age: 69
End: 2024-04-08

## 2024-04-08 VITALS
SYSTOLIC BLOOD PRESSURE: 132 MMHG | TEMPERATURE: 98.1 F | HEART RATE: 74 BPM | BODY MASS INDEX: 27.72 KG/M2 | WEIGHT: 166.6 LBS | DIASTOLIC BLOOD PRESSURE: 60 MMHG | OXYGEN SATURATION: 98 %

## 2024-04-08 DIAGNOSIS — E78.49 OTHER HYPERLIPIDEMIA: ICD-10-CM

## 2024-04-08 DIAGNOSIS — I25.118 ATHEROSCLEROTIC HEART DISEASE OF NATIVE CORONARY ARTERY WITH OTHER FORMS OF ANGINA PECTORIS (HCC): Primary | ICD-10-CM

## 2024-04-08 DIAGNOSIS — I10 PRIMARY HYPERTENSION: ICD-10-CM

## 2024-04-08 DIAGNOSIS — E03.8 OTHER SPECIFIED HYPOTHYROIDISM: ICD-10-CM

## 2024-04-08 DIAGNOSIS — G47.01 INSOMNIA DUE TO MEDICAL CONDITION: ICD-10-CM

## 2024-04-08 DIAGNOSIS — E11.9 TYPE 2 DIABETES MELLITUS WITHOUT COMPLICATION, WITHOUT LONG-TERM CURRENT USE OF INSULIN (HCC): ICD-10-CM

## 2024-04-08 PROCEDURE — 2022F DILAT RTA XM EVC RTNOPTHY: CPT | Performed by: FAMILY MEDICINE

## 2024-04-08 PROCEDURE — 1090F PRES/ABSN URINE INCON ASSESS: CPT | Performed by: FAMILY MEDICINE

## 2024-04-08 PROCEDURE — 1036F TOBACCO NON-USER: CPT | Performed by: FAMILY MEDICINE

## 2024-04-08 PROCEDURE — 3075F SYST BP GE 130 - 139MM HG: CPT | Performed by: FAMILY MEDICINE

## 2024-04-08 PROCEDURE — 3044F HG A1C LEVEL LT 7.0%: CPT | Performed by: FAMILY MEDICINE

## 2024-04-08 PROCEDURE — 99214 OFFICE O/P EST MOD 30 MIN: CPT | Performed by: FAMILY MEDICINE

## 2024-04-08 PROCEDURE — 1123F ACP DISCUSS/DSCN MKR DOCD: CPT | Performed by: FAMILY MEDICINE

## 2024-04-08 PROCEDURE — G8417 CALC BMI ABV UP PARAM F/U: HCPCS | Performed by: FAMILY MEDICINE

## 2024-04-08 PROCEDURE — 3017F COLORECTAL CA SCREEN DOC REV: CPT | Performed by: FAMILY MEDICINE

## 2024-04-08 PROCEDURE — G8399 PT W/DXA RESULTS DOCUMENT: HCPCS | Performed by: FAMILY MEDICINE

## 2024-04-08 PROCEDURE — G8427 DOCREV CUR MEDS BY ELIG CLIN: HCPCS | Performed by: FAMILY MEDICINE

## 2024-04-08 PROCEDURE — 3078F DIAST BP <80 MM HG: CPT | Performed by: FAMILY MEDICINE

## 2024-04-08 RX ORDER — SEMAGLUTIDE 0.25 MG/.5ML
0.25 INJECTION, SOLUTION SUBCUTANEOUS
Qty: 0.25 ML | Refills: 5 | Status: SHIPPED | OUTPATIENT
Start: 2024-04-08

## 2024-04-08 RX ORDER — ZOLPIDEM TARTRATE 10 MG/1
10 TABLET ORAL NIGHTLY
Qty: 30 TABLET | Refills: 2 | Status: SHIPPED | OUTPATIENT
Start: 2024-04-08 | End: 2024-07-07

## 2024-04-08 SDOH — ECONOMIC STABILITY: FOOD INSECURITY: WITHIN THE PAST 12 MONTHS, THE FOOD YOU BOUGHT JUST DIDN'T LAST AND YOU DIDN'T HAVE MONEY TO GET MORE.: NEVER TRUE

## 2024-04-08 SDOH — ECONOMIC STABILITY: FOOD INSECURITY: WITHIN THE PAST 12 MONTHS, YOU WORRIED THAT YOUR FOOD WOULD RUN OUT BEFORE YOU GOT MONEY TO BUY MORE.: NEVER TRUE

## 2024-04-08 SDOH — ECONOMIC STABILITY: INCOME INSECURITY: HOW HARD IS IT FOR YOU TO PAY FOR THE VERY BASICS LIKE FOOD, HOUSING, MEDICAL CARE, AND HEATING?: VERY HARD

## 2024-04-08 NOTE — TELEPHONE ENCOUNTER
Eda Carlos called and asked for a call back regarding needing a medication appeal for patients Ozempic.   [facility] ID number: [id number]

## 2024-04-08 NOTE — ASSESSMENT & PLAN NOTE
Monitored by specialist- no acute findings meriting change in the plan---WILL SEE IF INSURANCE WILL APPROVE WEGOVY AS SHE HAS HEART DISEASE.

## 2024-04-08 NOTE — TELEPHONE ENCOUNTER
Spoke with Savannah from [facility]. Savannah states they did not receive progress note with DM diagnosis. Please fax to [phone] Reference # [id number].

## 2024-04-08 NOTE — TELEPHONE ENCOUNTER
Attempted PA via Cover My Meds. Received the following message:    Eligibility could not be verified for this patient - patient not found. Please review patient information and re-submit.    Called [employer]. [employer] states Ozempic was denied. Patient started PA and did not state she has diabetes diagnosis. Transferred to appeals dept.    Spoke with Elisha from Appeals. Submitted appeal via phone. Reference # [id number]. Patient can follow up on appeal at [phone].

## 2024-04-10 ENCOUNTER — HOSPITAL ENCOUNTER (OUTPATIENT)
Dept: SLEEP CENTER | Age: 69
Discharge: HOME | End: 2024-04-13

## 2024-04-10 ASSESSMENT — ENCOUNTER SYMPTOMS
PHOTOPHOBIA: 0
NAUSEA: 0
BLURRED VISION: 0
ABDOMINAL PAIN: 0
COLOR CHANGE: 0
SHORTNESS OF BREATH: 0
BLOOD IN STOOL: 0
EYE PAIN: 0
COUGH: 0
SORE THROAT: 0
ABDOMINAL DISTENTION: 0

## 2024-04-10 NOTE — PROGRESS NOTES
Lachelle Quintero  1955 is a 69 y.o. female ,Established patient, here for evaluation of the following chief complaint(s):   Chief Complaint   Patient presents with    3 Month Follow-Up     Would like to discuss wegovy and if she could try it to help with her heart disease     Medication Refill          1. Atherosclerotic heart disease of native coronary artery with other forms of angina pectoris (HCC)  Assessment & Plan:   Monitored by specialist- no acute findings meriting change in the plan---WILL SEE IF INSURANCE WILL APPROVE WEGOVY AS SHE HAS HEART DISEASE.  Orders:  -     Semaglutide-Weight Management (WEGOVY) 0.25 MG/0.5ML SOAJ SC injection; Inject 0.25 mg into the skin every 7 days, Disp-0.25 mL, R-5Normal  2. Insomnia due to medical condition  -     zolpidem (AMBIEN) 10 MG tablet; Take 1 tablet by mouth at bedtime for 90 days. Max Daily Amount: 10 mg, Disp-30 tablet, R-2Normal  3. Type 2 diabetes mellitus without complication, without long-term current use of insulin (HCC)  -     Hemoglobin A1C; Future  4. Other specified hypothyroidism  -     TSH; Future  -     T4, Free; Future  5. Primary hypertension  -     CBC with Auto Differential; Future  -     Comprehensive Metabolic Panel; Future  6. Other hyperlipidemia  -     Lipid Panel; Future      - I have reviewed the patient’s controlled substance prescription history, as maintained in the [place] prescription monitoring program, so that the prescription(s) for a  controlled substance can be given.  - Patient is aware of addictive potential of medication.   - Patient is aware of respiratory suppression and is not experiencing any sedation with medication.   - Patient denies sedation or other side effects from medication  - Patient is instructed on compliance with dosing schedule and acknowledges that no early refill will be provided in the event of non-compliance, theft or loss of medication.   - Patient is aware that they may be subject to random

## 2024-04-11 ENCOUNTER — TELEPHONE (OUTPATIENT)
Age: 69
End: 2024-04-11

## 2024-04-11 NOTE — TELEPHONE ENCOUNTER
I received a Prior authorization denial for Ozempic. The denial states they reviewed the records and there was no mention of Diabetes Type II.     I contacted the patient to get additional information. She states Dr Escamilla office did the Appeal. She also states she contacted [facility] and was told they ran the prescription and it went  through with no problems and that her insurance covered it.      I told her that was great news. We will disregard the denial and asked her to let us know if any further information is needed. She agreed.

## 2024-04-29 ENCOUNTER — NURSE ONLY (OUTPATIENT)
Dept: FAMILY MEDICINE CLINIC | Facility: CLINIC | Age: 69
End: 2024-04-29

## 2024-04-29 DIAGNOSIS — E03.8 OTHER SPECIFIED HYPOTHYROIDISM: ICD-10-CM

## 2024-04-29 DIAGNOSIS — I10 PRIMARY HYPERTENSION: ICD-10-CM

## 2024-04-29 DIAGNOSIS — E11.9 TYPE 2 DIABETES MELLITUS WITHOUT COMPLICATION, WITHOUT LONG-TERM CURRENT USE OF INSULIN (HCC): ICD-10-CM

## 2024-04-29 DIAGNOSIS — E78.49 OTHER HYPERLIPIDEMIA: ICD-10-CM

## 2024-04-29 LAB
ALBUMIN SERPL-MCNC: 3.8 G/DL (ref 3.2–4.6)
ALBUMIN/GLOB SERPL: 1.4 (ref 1–1.9)
ALP SERPL-CCNC: 84 U/L (ref 35–104)
ALT SERPL-CCNC: 15 U/L (ref 12–65)
ANION GAP SERPL CALC-SCNC: 12 MMOL/L (ref 9–18)
AST SERPL-CCNC: 21 U/L (ref 15–37)
BASOPHILS # BLD: 0 K/UL (ref 0–0.2)
BASOPHILS NFR BLD: 1 % (ref 0–2)
BILIRUB SERPL-MCNC: 0.7 MG/DL (ref 0–1.2)
BUN SERPL-MCNC: 16 MG/DL (ref 8–23)
CALCIUM SERPL-MCNC: 9.9 MG/DL (ref 8.8–10.2)
CHLORIDE SERPL-SCNC: 104 MMOL/L (ref 98–107)
CHOLEST SERPL-MCNC: 133 MG/DL (ref 0–200)
CO2 SERPL-SCNC: 27 MMOL/L (ref 20–28)
CREAT SERPL-MCNC: 1.1 MG/DL (ref 0.6–1.1)
DIFFERENTIAL METHOD BLD: ABNORMAL
EOSINOPHIL # BLD: 0.1 K/UL (ref 0–0.8)
EOSINOPHIL NFR BLD: 1 % (ref 0.5–7.8)
ERYTHROCYTE [DISTWIDTH] IN BLOOD BY AUTOMATED COUNT: 15.4 % (ref 11.9–14.6)
EST. AVERAGE GLUCOSE BLD GHB EST-MCNC: 128 MG/DL
GLOBULIN SER CALC-MCNC: 2.8 G/DL (ref 2.3–3.5)
GLUCOSE SERPL-MCNC: 94 MG/DL (ref 70–99)
HBA1C MFR BLD: 6.1 % (ref 0–5.6)
HCT VFR BLD AUTO: 34.3 % (ref 35.8–46.3)
HDLC SERPL-MCNC: 56 MG/DL (ref 40–60)
HDLC SERPL: 2.4 (ref 0–5)
HGB BLD-MCNC: 10.9 G/DL (ref 11.7–15.4)
IMM GRANULOCYTES # BLD AUTO: 0 K/UL (ref 0–0.5)
IMM GRANULOCYTES NFR BLD AUTO: 0 % (ref 0–5)
LDLC SERPL CALC-MCNC: 63 MG/DL (ref 0–100)
LYMPHOCYTES # BLD: 1.5 K/UL (ref 0.5–4.6)
LYMPHOCYTES NFR BLD: 21 % (ref 13–44)
MCH RBC QN AUTO: 27.9 PG (ref 26.1–32.9)
MCHC RBC AUTO-ENTMCNC: 31.8 G/DL (ref 31.4–35)
MCV RBC AUTO: 87.7 FL (ref 82–102)
MONOCYTES # BLD: 0.5 K/UL (ref 0.1–1.3)
MONOCYTES NFR BLD: 8 % (ref 4–12)
NEUTS SEG # BLD: 4.7 K/UL (ref 1.7–8.2)
NEUTS SEG NFR BLD: 69 % (ref 43–78)
NRBC # BLD: 0 K/UL (ref 0–0.2)
PLATELET # BLD AUTO: 239 K/UL (ref 150–450)
PMV BLD AUTO: 10.9 FL (ref 9.4–12.3)
POTASSIUM SERPL-SCNC: 4.3 MMOL/L (ref 3.5–5.1)
PROT SERPL-MCNC: 6.6 G/DL (ref 6.3–8.2)
RBC # BLD AUTO: 3.91 M/UL (ref 4.05–5.2)
SODIUM SERPL-SCNC: 142 MMOL/L (ref 136–145)
T4 FREE SERPL-MCNC: 1.3 NG/DL (ref 0.9–1.7)
TRIGL SERPL-MCNC: 74 MG/DL (ref 0–150)
TSH, 3RD GENERATION: 0.55 UIU/ML (ref 0.27–4.2)
VLDLC SERPL CALC-MCNC: 15 MG/DL (ref 6–23)
WBC # BLD AUTO: 6.8 K/UL (ref 4.3–11.1)

## 2024-05-07 ENCOUNTER — OFFICE VISIT (OUTPATIENT)
Age: 69
End: 2024-05-07
Payer: MEDICARE

## 2024-05-07 VITALS
HEIGHT: 65 IN | BODY MASS INDEX: 28.22 KG/M2 | DIASTOLIC BLOOD PRESSURE: 62 MMHG | WEIGHT: 169.4 LBS | HEART RATE: 79 BPM | SYSTOLIC BLOOD PRESSURE: 122 MMHG

## 2024-05-07 DIAGNOSIS — I48.91 ATRIAL FIBRILLATION, UNSPECIFIED TYPE (HCC): Primary | ICD-10-CM

## 2024-05-07 PROCEDURE — 93000 ELECTROCARDIOGRAM COMPLETE: CPT | Performed by: INTERNAL MEDICINE

## 2024-05-07 PROCEDURE — 3017F COLORECTAL CA SCREEN DOC REV: CPT | Performed by: INTERNAL MEDICINE

## 2024-05-07 PROCEDURE — 3078F DIAST BP <80 MM HG: CPT | Performed by: INTERNAL MEDICINE

## 2024-05-07 PROCEDURE — 1123F ACP DISCUSS/DSCN MKR DOCD: CPT | Performed by: INTERNAL MEDICINE

## 2024-05-07 PROCEDURE — 1036F TOBACCO NON-USER: CPT | Performed by: INTERNAL MEDICINE

## 2024-05-07 PROCEDURE — G8399 PT W/DXA RESULTS DOCUMENT: HCPCS | Performed by: INTERNAL MEDICINE

## 2024-05-07 PROCEDURE — G8417 CALC BMI ABV UP PARAM F/U: HCPCS | Performed by: INTERNAL MEDICINE

## 2024-05-07 PROCEDURE — 3074F SYST BP LT 130 MM HG: CPT | Performed by: INTERNAL MEDICINE

## 2024-05-07 PROCEDURE — 99214 OFFICE O/P EST MOD 30 MIN: CPT | Performed by: INTERNAL MEDICINE

## 2024-05-07 PROCEDURE — 1090F PRES/ABSN URINE INCON ASSESS: CPT | Performed by: INTERNAL MEDICINE

## 2024-05-07 PROCEDURE — G8427 DOCREV CUR MEDS BY ELIG CLIN: HCPCS | Performed by: INTERNAL MEDICINE

## 2024-05-07 RX ORDER — DILTIAZEM HYDROCHLORIDE 120 MG/1
120 CAPSULE, COATED, EXTENDED RELEASE ORAL EVERY EVENING
Qty: 30 CAPSULE | Refills: 5 | Status: SHIPPED | OUTPATIENT
Start: 2024-05-07

## 2024-05-07 RX ORDER — ATORVASTATIN CALCIUM 40 MG/1
40 TABLET, FILM COATED ORAL DAILY
Qty: 90 TABLET | Refills: 3 | Status: SHIPPED | OUTPATIENT
Start: 2024-05-07 | End: 2025-05-02

## 2024-05-07 RX ORDER — AMLODIPINE BESYLATE 5 MG/1
5 TABLET ORAL DAILY
Qty: 90 TABLET | Refills: 3 | Status: SHIPPED | OUTPATIENT
Start: 2024-05-07 | End: 2024-05-07 | Stop reason: ALTCHOICE

## 2024-05-07 RX ORDER — NITROGLYCERIN 0.4 MG/1
0.4 TABLET SUBLINGUAL EVERY 5 MIN PRN
Qty: 25 TABLET | Refills: 1 | Status: SHIPPED | OUTPATIENT
Start: 2024-05-07

## 2024-05-07 RX ORDER — NEBIVOLOL 10 MG/1
10 TABLET ORAL DAILY
Qty: 90 TABLET | Refills: 3 | Status: SHIPPED | OUTPATIENT
Start: 2024-05-07 | End: 2024-05-07 | Stop reason: ALTCHOICE

## 2024-05-07 RX ORDER — FLECAINIDE ACETATE 50 MG/1
50 TABLET ORAL 2 TIMES DAILY
Qty: 180 TABLET | Refills: 3 | Status: SHIPPED | OUTPATIENT
Start: 2024-05-07

## 2024-05-07 ASSESSMENT — ENCOUNTER SYMPTOMS
EYES NEGATIVE: 1
ALLERGIC/IMMUNOLOGIC NEGATIVE: 1
GASTROINTESTINAL NEGATIVE: 1
SHORTNESS OF BREATH: 0

## 2024-05-07 NOTE — PROGRESS NOTES
Allergies   Allergen Reactions    Ace Inhibitors Angioedema and Swelling     LIPS, FACE AND THROAT      Codeine Itching and Other (See Comments)     DERIVATIVES  Other reaction(s): hives    Morphine Anxiety and Other (See Comments)     Other reaction(s): Rage  \" only in pill form,\" caused severe agitation.       Past Medical History:   Diagnosis Date    Arthritis     Asthma     Atherosclerotic heart disease of native coronary artery with other forms of angina pectoris ([facility]) 3/14/2022    Chronic kidney disease due to hypertension 03/14/2022    COPD (chronic obstructive pulmonary disease) ([facility]) 10/5/2015    GERD (gastroesophageal reflux disease)     HTN (hypertension), benign 10/5/2015    MUSHTAQ (obstructive sleep apnea) 4/28/2016    Psychiatric disorder     anxiety    Thyroid disease      Past Surgical History:   Procedure Laterality Date    APPENDECTOMY      CHOLECYSTECTOMY      EP DEVICE PROCEDURE N/A 11/17/2023    Ablation A-flutter performed by Derrek Gonzalez MD at [facility] CARDIAC CATH LAB    HYSTERECTOMY (CERVIX STATUS UNKNOWN)      ORTHOPEDIC SURGERY      neck x4, back x 5 - LAST ONE IN 2021    OTHER SURGICAL HISTORY      L3-L4 FUSION    ROTATOR CUFF REPAIR Right 06/11/2020    SHOULDER SURGERY Left 09/2022    TUBAL LIGATION       Family History   Problem Relation Age of Onset    Cancer Sister     Diabetes Sister     Breast Cancer Sister     Heart Attack Father     Breast Cancer Sister     Cancer Mother     Thyroid Disease Mother         goiter    Hypertension Father     Cancer Brother      Social History     Tobacco Use    Smoking status: Former     Current packs/day: 0.00     Average packs/day: 2.0 packs/day for 40.0 years (80.0 ttl pk-yrs)     Types: Cigarettes     Start date: 5/5/1974     Quit date: 5/5/2014     Years since quitting: 10.0    Smokeless tobacco: Former   Substance Use Topics    Alcohol use: No     Alcohol/week: 0.0 standard drinks of alcohol       ROS:  A comprehensive review of systems was

## 2024-05-09 ENCOUNTER — OFFICE VISIT (OUTPATIENT)
Dept: FAMILY MEDICINE CLINIC | Facility: CLINIC | Age: 69
End: 2024-05-09
Payer: MEDICARE

## 2024-05-09 VITALS
WEIGHT: 167.2 LBS | HEART RATE: 71 BPM | TEMPERATURE: 98.1 F | HEIGHT: 65 IN | OXYGEN SATURATION: 97 % | DIASTOLIC BLOOD PRESSURE: 70 MMHG | BODY MASS INDEX: 27.86 KG/M2 | SYSTOLIC BLOOD PRESSURE: 120 MMHG

## 2024-05-09 DIAGNOSIS — I48.19 PERSISTENT ATRIAL FIBRILLATION (HCC): ICD-10-CM

## 2024-05-09 DIAGNOSIS — G47.19 EXCESSIVE DAYTIME SLEEPINESS: ICD-10-CM

## 2024-05-09 DIAGNOSIS — R73.03 PREDIABETES: Primary | ICD-10-CM

## 2024-05-09 DIAGNOSIS — E66.01 MORBID OBESITY (HCC): ICD-10-CM

## 2024-05-09 DIAGNOSIS — D50.8 IRON DEFICIENCY ANEMIA SECONDARY TO INADEQUATE DIETARY IRON INTAKE: ICD-10-CM

## 2024-05-09 DIAGNOSIS — G47.33 OBSTRUCTIVE SLEEP APNEA SYNDROME: ICD-10-CM

## 2024-05-09 DIAGNOSIS — H35.3290 EXUDATIVE AGE-RELATED MACULAR DEGENERATION, UNSPECIFIED LATERALITY, UNSPECIFIED STAGE (HCC): ICD-10-CM

## 2024-05-09 LAB
ALBUMIN, URINE, POC: 10 MG/L
CREATININE, URINE, POC: 50 MG/DL
FERRITIN SERPL-MCNC: 14 NG/ML (ref 8–388)
HGB BLD-MCNC: 10.9 G/DL (ref 11.7–15.4)
IRON SERPL-MCNC: 39 UG/DL (ref 35–100)
MICROALB/CREAT RATIO, POC: <30 MG/G

## 2024-05-09 PROCEDURE — 82044 UR ALBUMIN SEMIQUANTITATIVE: CPT | Performed by: FAMILY MEDICINE

## 2024-05-09 PROCEDURE — 1036F TOBACCO NON-USER: CPT | Performed by: FAMILY MEDICINE

## 2024-05-09 PROCEDURE — 1123F ACP DISCUSS/DSCN MKR DOCD: CPT | Performed by: FAMILY MEDICINE

## 2024-05-09 PROCEDURE — 3074F SYST BP LT 130 MM HG: CPT | Performed by: FAMILY MEDICINE

## 2024-05-09 PROCEDURE — G8399 PT W/DXA RESULTS DOCUMENT: HCPCS | Performed by: FAMILY MEDICINE

## 2024-05-09 PROCEDURE — 1090F PRES/ABSN URINE INCON ASSESS: CPT | Performed by: FAMILY MEDICINE

## 2024-05-09 PROCEDURE — G8417 CALC BMI ABV UP PARAM F/U: HCPCS | Performed by: FAMILY MEDICINE

## 2024-05-09 PROCEDURE — G8427 DOCREV CUR MEDS BY ELIG CLIN: HCPCS | Performed by: FAMILY MEDICINE

## 2024-05-09 PROCEDURE — 3017F COLORECTAL CA SCREEN DOC REV: CPT | Performed by: FAMILY MEDICINE

## 2024-05-09 PROCEDURE — 99214 OFFICE O/P EST MOD 30 MIN: CPT | Performed by: FAMILY MEDICINE

## 2024-05-09 PROCEDURE — 3078F DIAST BP <80 MM HG: CPT | Performed by: FAMILY MEDICINE

## 2024-05-10 LAB — C PEPTIDE SERPL-MCNC: 3.8 NG/ML (ref 1.1–4.4)

## 2024-05-11 RX ORDER — SEMAGLUTIDE 0.25 MG/.5ML
0.25 INJECTION, SOLUTION SUBCUTANEOUS
Qty: 0.5 ML | Refills: 5 | Status: SHIPPED | OUTPATIENT
Start: 2024-05-11

## 2024-05-11 ASSESSMENT — ENCOUNTER SYMPTOMS
EYE PAIN: 0
COUGH: 0
NAUSEA: 0
SWOLLEN GLANDS: 0
BLOOD IN STOOL: 0
SHORTNESS OF BREATH: 0
PHOTOPHOBIA: 0
ABDOMINAL PAIN: 0
SORE THROAT: 0
COLOR CHANGE: 0
ABDOMINAL DISTENTION: 0

## 2024-05-11 NOTE — PROGRESS NOTES
Lachelle Quintero  1955 is a 69 y.o. female ,Established patient, here for evaluation of the following chief complaint(s):   Chief Complaint   Patient presents with    Follow-up     Coming in today to go over lab results and sleep study results- would like to see about getting a new prescription for wogvey with diagnose with heart disease     Medication Refill          1. Prediabetes  -     C-Peptide; Future  -     AMB POC URINE, MICROALBUMIN, SEMIQUANT (3 RESULTS)  2. Iron deficiency anemia secondary to inadequate dietary iron intake  -     Hemoglobin; Future  -     Iron; Future  -     Ferritin; Future  -     [facility] - Faraz Snow MD, Gastroenterology, [facility]  3. Obstructive sleep apnea syndrome  -     [facility]pey [facility], Sophie \"[facility]\", [facility]  -     AMB POC URINE, MICROALBUMIN, SEMIQUANT (3 RESULTS)  4. Excessive daytime sleepiness  -     [facility], Sophie \"Foster\", [facility]  5. Exudative age-related macular degeneration, unspecified laterality, unspecified stage (HCC)--chronic stable ; followed by specialist, eye.        Return in about 3 months (around 8/9/2024).        Subjective   SUBJECTIVE/OBJECTIVE:  Medication Refill  This is a chronic problem. The current episode started more than 1 year ago. The problem occurs daily. The problem has been unchanged. Associated symptoms include fatigue. Pertinent negatives include no abdominal pain, anorexia, chest pain, chills, coughing, fever, headaches, joint swelling, myalgias, nausea, rash, sore throat, swollen glands or weakness.   Sleep Problem  This is a chronic problem. The current episode started more than 1 year ago. The problem occurs daily. The problem has been unchanged. Associated symptoms include fatigue. Pertinent negatives include no abdominal pain, anorexia, chest pain, chills, coughing, fever, headaches, joint swelling, myalgias, nausea, rash, sore throat, swollen glands or weakness.   Anemia  Presents for

## 2024-07-11 DIAGNOSIS — G47.01 INSOMNIA DUE TO MEDICAL CONDITION: ICD-10-CM

## 2024-07-11 RX ORDER — ZOLPIDEM TARTRATE 10 MG/1
10 TABLET ORAL NIGHTLY
Qty: 30 TABLET | Refills: 2 | Status: SHIPPED | OUTPATIENT
Start: 2024-07-11 | End: 2024-10-09

## 2024-07-11 NOTE — TELEPHONE ENCOUNTER
NEEDING:  - Ambien--10 mg (NOT LISTED)/only has three pills/1x at bedtime/30 days supply/[facility] at [address] [place] [address]/[phone]    The patient would like to be called before we close the office today.  Her phone number is [phone].

## 2024-07-22 ENCOUNTER — TELEPHONE (OUTPATIENT)
Age: 69
End: 2024-07-22

## 2024-07-22 NOTE — TELEPHONE ENCOUNTER
Pt said her heart is racing, and she can hear her heart beat in her ears. She would like to speak to a nurse.

## 2024-07-22 NOTE — TELEPHONE ENCOUNTER
Patient called stating that her heart beats so hard sometimes that she can see her shirt move. Sometimes its hard to breathe. Patient states that this has been going on for a few weeks. Would like an appointment with Dr. Joseph in . Appointment scheduled for Wed 7-24-24 1115. Patient thanked me//aamir

## 2024-07-24 ENCOUNTER — OFFICE VISIT (OUTPATIENT)
Age: 69
End: 2024-07-24
Payer: MEDICARE

## 2024-07-24 ENCOUNTER — TELEPHONE (OUTPATIENT)
Age: 69
End: 2024-07-24

## 2024-07-24 VITALS
DIASTOLIC BLOOD PRESSURE: 72 MMHG | SYSTOLIC BLOOD PRESSURE: 136 MMHG | HEART RATE: 80 BPM | BODY MASS INDEX: 26.92 KG/M2 | WEIGHT: 161.6 LBS | HEIGHT: 65 IN

## 2024-07-24 DIAGNOSIS — I48.91 ATRIAL FIBRILLATION, UNSPECIFIED TYPE (HCC): ICD-10-CM

## 2024-07-24 DIAGNOSIS — I49.1 ATRIAL ECTOPY: ICD-10-CM

## 2024-07-24 DIAGNOSIS — D64.9 ANEMIA, UNSPECIFIED TYPE: ICD-10-CM

## 2024-07-24 DIAGNOSIS — I48.91 ATRIAL FIBRILLATION, UNSPECIFIED TYPE (HCC): Primary | ICD-10-CM

## 2024-07-24 LAB
ANION GAP SERPL CALC-SCNC: 11 MMOL/L (ref 9–18)
BASOPHILS # BLD: 0.1 K/UL (ref 0–0.2)
BASOPHILS NFR BLD: 1 % (ref 0–2)
BUN SERPL-MCNC: 13 MG/DL (ref 8–23)
CALCIUM SERPL-MCNC: 9.5 MG/DL (ref 8.8–10.2)
CHLORIDE SERPL-SCNC: 101 MMOL/L (ref 98–107)
CO2 SERPL-SCNC: 26 MMOL/L (ref 20–28)
CREAT SERPL-MCNC: 1.09 MG/DL (ref 0.6–1.1)
DIFFERENTIAL METHOD BLD: ABNORMAL
EOSINOPHIL # BLD: 0.1 K/UL (ref 0–0.8)
EOSINOPHIL NFR BLD: 1 % (ref 0.5–7.8)
ERYTHROCYTE [DISTWIDTH] IN BLOOD BY AUTOMATED COUNT: 14.7 % (ref 11.9–14.6)
FOLATE SERPL-MCNC: 11.4 NG/ML (ref 3.1–17.5)
GLUCOSE SERPL-MCNC: 117 MG/DL (ref 70–99)
HCT VFR BLD AUTO: 34.9 % (ref 35.8–46.3)
HGB BLD-MCNC: 10.7 G/DL (ref 11.7–15.4)
IMM GRANULOCYTES # BLD AUTO: 0 K/UL (ref 0–0.5)
IMM GRANULOCYTES NFR BLD AUTO: 0 % (ref 0–5)
LYMPHOCYTES # BLD: 1.7 K/UL (ref 0.5–4.6)
LYMPHOCYTES NFR BLD: 21 % (ref 13–44)
MCH RBC QN AUTO: 27.8 PG (ref 26.1–32.9)
MCHC RBC AUTO-ENTMCNC: 30.7 G/DL (ref 31.4–35)
MCV RBC AUTO: 90.6 FL (ref 82–102)
MONOCYTES # BLD: 0.6 K/UL (ref 0.1–1.3)
MONOCYTES NFR BLD: 7 % (ref 4–12)
NEUTS SEG # BLD: 5.7 K/UL (ref 1.7–8.2)
NEUTS SEG NFR BLD: 70 % (ref 43–78)
NRBC # BLD: 0 K/UL (ref 0–0.2)
PLATELET # BLD AUTO: 322 K/UL (ref 150–450)
PMV BLD AUTO: 10.4 FL (ref 9.4–12.3)
POTASSIUM SERPL-SCNC: 4 MMOL/L (ref 3.5–5.1)
RBC # BLD AUTO: 3.85 M/UL (ref 4.05–5.2)
SODIUM SERPL-SCNC: 137 MMOL/L (ref 136–145)
TRANSFERRIN SERPL-MCNC: 299 MG/DL (ref 200–360)
TSH W FREE THYROID IF ABNORMAL: 0.64 UIU/ML (ref 0.27–4.2)
VIT B12 SERPL-MCNC: 554 PG/ML (ref 193–986)
WBC # BLD AUTO: 8.2 K/UL (ref 4.3–11.1)

## 2024-07-24 PROCEDURE — 3078F DIAST BP <80 MM HG: CPT | Performed by: INTERNAL MEDICINE

## 2024-07-24 PROCEDURE — G8427 DOCREV CUR MEDS BY ELIG CLIN: HCPCS | Performed by: INTERNAL MEDICINE

## 2024-07-24 PROCEDURE — 99214 OFFICE O/P EST MOD 30 MIN: CPT | Performed by: INTERNAL MEDICINE

## 2024-07-24 PROCEDURE — 3075F SYST BP GE 130 - 139MM HG: CPT | Performed by: INTERNAL MEDICINE

## 2024-07-24 PROCEDURE — 3017F COLORECTAL CA SCREEN DOC REV: CPT | Performed by: INTERNAL MEDICINE

## 2024-07-24 PROCEDURE — G8417 CALC BMI ABV UP PARAM F/U: HCPCS | Performed by: INTERNAL MEDICINE

## 2024-07-24 PROCEDURE — G8399 PT W/DXA RESULTS DOCUMENT: HCPCS | Performed by: INTERNAL MEDICINE

## 2024-07-24 PROCEDURE — 1123F ACP DISCUSS/DSCN MKR DOCD: CPT | Performed by: INTERNAL MEDICINE

## 2024-07-24 PROCEDURE — 1090F PRES/ABSN URINE INCON ASSESS: CPT | Performed by: INTERNAL MEDICINE

## 2024-07-24 PROCEDURE — 1036F TOBACCO NON-USER: CPT | Performed by: INTERNAL MEDICINE

## 2024-07-24 ASSESSMENT — ENCOUNTER SYMPTOMS
NAIL CHANGES: 0
COUGH: 1
STRIDOR: 0
APHONIA: 0
EYE PAIN: 0
SPUTUM PRODUCTION: 1
ABDOMINAL PAIN: 0

## 2024-07-24 NOTE — PROGRESS NOTES
[facility], [address], [address]  [place] [address]  PHONE: [phone]    SUBJECTIVE:   Lachelle Quintero is a 69 y.o. female 1955   seen for a follow up visit regarding the following:     Chief Complaint   Patient presents with    Hypertension     History of present illness: 69 y.o. female presented for follow-up 7/24/24 history of atrial flutter ablation she has had palpitations intermittently.  On days 2 she has increased heart rates she feels poorly.  Other days she is fatigued.  She had a home sleep study performed with mild obstructive sleep apnea.  She has had stools only 2 or 3 times per week.  Most recent labs showed mild anemia she is on Eliquis for anticoagulation therapy.    Interval Hx:   Patient is very concerned with CAD due to family with history of MI. Back surgery pain no better. Tearful today. Pain.      The patient had a previous history of hypertension, as well as nonobstructive coronary artery disease.  She presented off schedule 04/27/2018.  Unfortunately, she had an episode of angioedema that occurred associated with ACE inhibitor therapy.  The patient  was seen in [facility] Emergency Department.  She was given steroids, as well as Benadryl with resolution.  Her Lisinopril was discontinued.        She presented 11/30/2018 scheduled back surgery possibly due to bulging disc.      Cardiac history:    Cardiac catheterization was in 2010 at [facility].     2015 with stress perfusion imaging that was normal.    Cardiac catheterization 5/11/2016 mild nonobstructive coronary disease with moderate diffuse calcifications noted.   left main angiographically normal, LAD mild nonobstructive disease with focal stenosis at the origin in the first diagonal branch, 20%  narrowing at the circumflex, small low grade calcification luminal irregularities, right coronary and large PDA with 30% stenosis.      Ranexa initiated 12/2016 with improvement of some symptoms

## 2024-07-24 NOTE — TELEPHONE ENCOUNTER
[employer] reports auto trigger event today  2:49p first documentation A Flutter  101 bpm  67 seconds.     Pt seen in clinic today.  Hx A flutter ablation.   Monitor placed to evaluate palpitations.   Pt is anticoagulated, Eliquis 5mg BID. cgh

## 2024-08-13 ENCOUNTER — OFFICE VISIT (OUTPATIENT)
Dept: FAMILY MEDICINE CLINIC | Facility: CLINIC | Age: 69
End: 2024-08-13
Payer: MEDICARE

## 2024-08-13 VITALS
OXYGEN SATURATION: 98 % | WEIGHT: 158 LBS | BODY MASS INDEX: 26.33 KG/M2 | HEIGHT: 65 IN | DIASTOLIC BLOOD PRESSURE: 80 MMHG | SYSTOLIC BLOOD PRESSURE: 132 MMHG | HEART RATE: 78 BPM

## 2024-08-13 DIAGNOSIS — G47.01 INSOMNIA DUE TO MEDICAL CONDITION: ICD-10-CM

## 2024-08-13 DIAGNOSIS — E03.9 PRIMARY HYPOTHYROIDISM: ICD-10-CM

## 2024-08-13 DIAGNOSIS — D50.8 OTHER IRON DEFICIENCY ANEMIA: ICD-10-CM

## 2024-08-13 DIAGNOSIS — E55.9 VITAMIN D DEFICIENCY: ICD-10-CM

## 2024-08-13 DIAGNOSIS — E78.49 OTHER HYPERLIPIDEMIA: Primary | ICD-10-CM

## 2024-08-13 DIAGNOSIS — E11.9 TYPE 2 DIABETES MELLITUS WITHOUT COMPLICATION, WITHOUT LONG-TERM CURRENT USE OF INSULIN (HCC): ICD-10-CM

## 2024-08-13 PROBLEM — Z12.11 SCREENING FOR COLON CANCER: Status: ACTIVE | Noted: 2024-08-08

## 2024-08-13 PROBLEM — D64.9 ANEMIA: Status: ACTIVE | Noted: 2024-08-08

## 2024-08-13 PROBLEM — K63.5 COLON POLYPS: Status: ACTIVE | Noted: 2023-02-23

## 2024-08-13 LAB
25(OH)D3 SERPL-MCNC: 28.3 NG/ML (ref 30–100)
ALBUMIN SERPL-MCNC: 3.7 G/DL (ref 3.2–4.6)
ALBUMIN/GLOB SERPL: 1.4 (ref 1–1.9)
ALP SERPL-CCNC: 85 U/L (ref 35–104)
ALT SERPL-CCNC: 13 U/L (ref 12–65)
ANION GAP SERPL CALC-SCNC: 9 MMOL/L (ref 9–18)
AST SERPL-CCNC: 22 U/L (ref 15–37)
BASOPHILS # BLD: 0.1 K/UL (ref 0–0.2)
BASOPHILS NFR BLD: 1 % (ref 0–2)
BILIRUB SERPL-MCNC: 0.7 MG/DL (ref 0–1.2)
BUN SERPL-MCNC: 11 MG/DL (ref 8–23)
CALCIUM SERPL-MCNC: 9.6 MG/DL (ref 8.8–10.2)
CHLORIDE SERPL-SCNC: 104 MMOL/L (ref 98–107)
CHOLEST SERPL-MCNC: 130 MG/DL (ref 0–200)
CO2 SERPL-SCNC: 27 MMOL/L (ref 20–28)
CREAT SERPL-MCNC: 0.96 MG/DL (ref 0.6–1.1)
DIFFERENTIAL METHOD BLD: ABNORMAL
EOSINOPHIL # BLD: 0.1 K/UL (ref 0–0.8)
EOSINOPHIL NFR BLD: 1 % (ref 0.5–7.8)
ERYTHROCYTE [DISTWIDTH] IN BLOOD BY AUTOMATED COUNT: 14.8 % (ref 11.9–14.6)
EST. AVERAGE GLUCOSE BLD GHB EST-MCNC: 118 MG/DL
FERRITIN SERPL-MCNC: 11 NG/ML (ref 8–388)
GLOBULIN SER CALC-MCNC: 2.7 G/DL (ref 2.3–3.5)
GLUCOSE SERPL-MCNC: 95 MG/DL (ref 70–99)
HBA1C MFR BLD: 5.7 % (ref 0–5.6)
HCT VFR BLD AUTO: 34.6 % (ref 35.8–46.3)
HDLC SERPL-MCNC: 59 MG/DL (ref 40–60)
HDLC SERPL: 2.2 (ref 0–5)
HGB BLD-MCNC: 10.7 G/DL (ref 11.7–15.4)
IMM GRANULOCYTES # BLD AUTO: 0 K/UL (ref 0–0.5)
IMM GRANULOCYTES NFR BLD AUTO: 1 % (ref 0–5)
IRON SERPL-MCNC: 57 UG/DL (ref 35–100)
LDLC SERPL CALC-MCNC: 58 MG/DL (ref 0–100)
LYMPHOCYTES # BLD: 1.1 K/UL (ref 0.5–4.6)
LYMPHOCYTES NFR BLD: 18 % (ref 13–44)
MCH RBC QN AUTO: 28 PG (ref 26.1–32.9)
MCHC RBC AUTO-ENTMCNC: 30.9 G/DL (ref 31.4–35)
MCV RBC AUTO: 90.6 FL (ref 82–102)
MONOCYTES # BLD: 0.5 K/UL (ref 0.1–1.3)
MONOCYTES NFR BLD: 7 % (ref 4–12)
NEUTS SEG # BLD: 4.7 K/UL (ref 1.7–8.2)
NEUTS SEG NFR BLD: 72 % (ref 43–78)
NRBC # BLD: 0 K/UL (ref 0–0.2)
PLATELET # BLD AUTO: 258 K/UL (ref 150–450)
PMV BLD AUTO: 10.8 FL (ref 9.4–12.3)
POTASSIUM SERPL-SCNC: 4.1 MMOL/L (ref 3.5–5.1)
PROT SERPL-MCNC: 6.5 G/DL (ref 6.3–8.2)
RBC # BLD AUTO: 3.82 M/UL (ref 4.05–5.2)
SODIUM SERPL-SCNC: 140 MMOL/L (ref 136–145)
TRIGL SERPL-MCNC: 65 MG/DL (ref 0–150)
VLDLC SERPL CALC-MCNC: 13 MG/DL (ref 6–23)
WBC # BLD AUTO: 6.4 K/UL (ref 4.3–11.1)

## 2024-08-13 PROCEDURE — 2022F DILAT RTA XM EVC RTNOPTHY: CPT | Performed by: FAMILY MEDICINE

## 2024-08-13 PROCEDURE — 3017F COLORECTAL CA SCREEN DOC REV: CPT | Performed by: FAMILY MEDICINE

## 2024-08-13 PROCEDURE — G8427 DOCREV CUR MEDS BY ELIG CLIN: HCPCS | Performed by: FAMILY MEDICINE

## 2024-08-13 PROCEDURE — G8399 PT W/DXA RESULTS DOCUMENT: HCPCS | Performed by: FAMILY MEDICINE

## 2024-08-13 PROCEDURE — 1090F PRES/ABSN URINE INCON ASSESS: CPT | Performed by: FAMILY MEDICINE

## 2024-08-13 PROCEDURE — 3079F DIAST BP 80-89 MM HG: CPT | Performed by: FAMILY MEDICINE

## 2024-08-13 PROCEDURE — 1123F ACP DISCUSS/DSCN MKR DOCD: CPT | Performed by: FAMILY MEDICINE

## 2024-08-13 PROCEDURE — 1036F TOBACCO NON-USER: CPT | Performed by: FAMILY MEDICINE

## 2024-08-13 PROCEDURE — 3044F HG A1C LEVEL LT 7.0%: CPT | Performed by: FAMILY MEDICINE

## 2024-08-13 PROCEDURE — G8417 CALC BMI ABV UP PARAM F/U: HCPCS | Performed by: FAMILY MEDICINE

## 2024-08-13 PROCEDURE — 99214 OFFICE O/P EST MOD 30 MIN: CPT | Performed by: FAMILY MEDICINE

## 2024-08-13 PROCEDURE — 3075F SYST BP GE 130 - 139MM HG: CPT | Performed by: FAMILY MEDICINE

## 2024-08-13 RX ORDER — ZOLPIDEM TARTRATE 10 MG/1
10 TABLET ORAL NIGHTLY
Qty: 30 TABLET | Refills: 2 | Status: SHIPPED | OUTPATIENT
Start: 2024-08-13 | End: 2024-11-11

## 2024-08-13 RX ORDER — LEVOTHYROXINE SODIUM 88 UG/1
88 TABLET ORAL
Qty: 90 TABLET | Refills: 3 | Status: SHIPPED | OUTPATIENT
Start: 2024-08-13

## 2024-08-14 ENCOUNTER — TELEPHONE (OUTPATIENT)
Age: 69
End: 2024-08-14

## 2024-08-14 NOTE — TELEPHONE ENCOUNTER
Informed pt of Dr. Joseph's response. Pt voiced understanding States has seen Dr. Gonzalez for A Flutter ablation. HA f/u in Nov. Prefers to see him EA. Informed pt will update Dr. Gonzalez tomorrow and myself or Dr. Gonzalez's  will call pt to schedule appt.  Pt voiced understanding and agreement with POC and thanks. cgh

## 2024-08-14 NOTE — TELEPHONE ENCOUNTER
Informed pt of monitor results.   Pt confirms at times light headed, dizzy.  At times sob worse and when HR is racing can look down and see shirt moving.    Drinks x1 20 oz Sprite daily and eats ice.  Advise x2 20 oz Sprites daily and ice.   Will update Dr. Joseph and return call.   Pt voiced understanding. Confirms 8/22/24 FU.  cgh

## 2024-08-14 NOTE — TELEPHONE ENCOUNTER
Triage received End of Summary report for Holter   7/24/24- 7/31/24.   Report uploaded Cardiology Tab.  UCD f/u 8/22/24 Dr. Joseph, ALESSIA  cgh

## 2024-08-14 NOTE — TELEPHONE ENCOUNTER
Holter monitor shows episodes of atrial flutter with rapid ventricular response.  She is anticoagulated and on antiarrhythmic therapies.  Please inquire if she is having symptoms during these episodes.

## 2024-08-15 ASSESSMENT — ENCOUNTER SYMPTOMS
BLURRED VISION: 0
BLOOD IN STOOL: 0
NAUSEA: 0
COUGH: 0
COLOR CHANGE: 0
EYE PAIN: 0
HOARSE VOICE: 0
ABDOMINAL DISTENTION: 0
BACK PAIN: 1
SORE THROAT: 0
SHORTNESS OF BREATH: 0
ABDOMINAL PAIN: 0
PHOTOPHOBIA: 0

## 2024-08-15 NOTE — PROGRESS NOTES
Lachelle Quintero  1955 is a 69 y.o. female ,Established patient, here for evaluation of the following chief complaint(s):   Chief Complaint   Patient presents with    Follow-up          1. Other hyperlipidemia  -     Lipid Panel; Future  2. Insomnia due to medical condition  -     zolpidem (AMBIEN) 10 MG tablet; Take 1 tablet by mouth at bedtime for 90 days. Max Daily Amount: 10 mg, Disp-30 tablet, R-2Normal  3. Primary hypothyroidism  -     levothyroxine (SYNTHROID) 88 MCG tablet; Take 1 tablet by mouth every morning (before breakfast), Disp-90 tablet, R-3Normal  -     CBC with Auto Differential; Future  -     Comprehensive Metabolic Panel; Future  4. Vitamin D deficiency  -     Vitamin D 25 Hydroxy; Future  5. Type 2 diabetes mellitus without complication, without long-term current use of insulin (HCC)  -     Hemoglobin A1C; Future  6. Other iron deficiency anemia  -     Iron; Future  -     Ferritin; Future      - I have reviewed the patient’s controlled substance prescription history, as maintained in the [place] prescription monitoring program, so that the prescription(s) for a  controlled substance can be given.  - Patient is aware of addictive potential of medication.   - Patient is aware of respiratory suppression and is not experiencing any sedation with medication.   - Patient denies sedation or other side effects from medication  - Patient is instructed on compliance with dosing schedule and acknowledges that no early refill will be provided in the event of non-compliance, theft or loss of medication.   - Patient is aware that they may be subject to random drug testing and pill counts.      Return in about 6 weeks (around 9/24/2024).        Subjective   SUBJECTIVE/OBJECTIVE:  She has not heard from pain center referral    Hypertension  This is a chronic problem. The current episode started more than 1 year ago. The problem has been gradually improving since onset. The problem is controlled.

## 2024-08-30 DIAGNOSIS — M70.60 TROCHANTERIC BURSITIS, UNSPECIFIED LATERALITY: ICD-10-CM

## 2024-08-30 DIAGNOSIS — M19.019 SHOULDER ARTHRITIS: Primary | ICD-10-CM

## 2024-08-30 DIAGNOSIS — M41.9 SCOLIOSIS OF LUMBAR SPINE, UNSPECIFIED SCOLIOSIS TYPE: ICD-10-CM

## 2024-09-03 ENCOUNTER — OFFICE VISIT (OUTPATIENT)
Age: 69
End: 2024-09-03
Payer: MEDICARE

## 2024-09-03 VITALS
BODY MASS INDEX: 26.33 KG/M2 | DIASTOLIC BLOOD PRESSURE: 84 MMHG | HEART RATE: 66 BPM | HEIGHT: 65 IN | WEIGHT: 158 LBS | SYSTOLIC BLOOD PRESSURE: 156 MMHG

## 2024-09-03 DIAGNOSIS — I10 HTN (HYPERTENSION), BENIGN: ICD-10-CM

## 2024-09-03 DIAGNOSIS — R07.2 PRECORDIAL CHEST PAIN: Primary | ICD-10-CM

## 2024-09-03 DIAGNOSIS — Z79.899 LONG TERM CURRENT USE OF ANTIARRHYTHMIC DRUG: ICD-10-CM

## 2024-09-03 DIAGNOSIS — I48.91 ATRIAL FIBRILLATION, UNSPECIFIED TYPE (HCC): ICD-10-CM

## 2024-09-03 PROCEDURE — 1090F PRES/ABSN URINE INCON ASSESS: CPT | Performed by: INTERNAL MEDICINE

## 2024-09-03 PROCEDURE — 3078F DIAST BP <80 MM HG: CPT | Performed by: INTERNAL MEDICINE

## 2024-09-03 PROCEDURE — 93000 ELECTROCARDIOGRAM COMPLETE: CPT | Performed by: INTERNAL MEDICINE

## 2024-09-03 PROCEDURE — 3077F SYST BP >= 140 MM HG: CPT | Performed by: INTERNAL MEDICINE

## 2024-09-03 PROCEDURE — 1123F ACP DISCUSS/DSCN MKR DOCD: CPT | Performed by: INTERNAL MEDICINE

## 2024-09-03 PROCEDURE — 1036F TOBACCO NON-USER: CPT | Performed by: INTERNAL MEDICINE

## 2024-09-03 PROCEDURE — G8417 CALC BMI ABV UP PARAM F/U: HCPCS | Performed by: INTERNAL MEDICINE

## 2024-09-03 PROCEDURE — G8399 PT W/DXA RESULTS DOCUMENT: HCPCS | Performed by: INTERNAL MEDICINE

## 2024-09-03 PROCEDURE — 99214 OFFICE O/P EST MOD 30 MIN: CPT | Performed by: INTERNAL MEDICINE

## 2024-09-03 PROCEDURE — 3017F COLORECTAL CA SCREEN DOC REV: CPT | Performed by: INTERNAL MEDICINE

## 2024-09-03 PROCEDURE — G8427 DOCREV CUR MEDS BY ELIG CLIN: HCPCS | Performed by: INTERNAL MEDICINE

## 2024-09-03 RX ORDER — SPIRONOLACTONE AND HYDROCHLOROTHIAZIDE 25; 25 MG/1; MG/1
1 TABLET ORAL DAILY
Qty: 90 TABLET | Refills: 3 | Status: SHIPPED | OUTPATIENT
Start: 2024-09-03

## 2024-09-03 RX ORDER — NEBIVOLOL 10 MG/1
10 TABLET ORAL DAILY
Qty: 90 TABLET | Refills: 1 | Status: SHIPPED | OUTPATIENT
Start: 2024-09-03

## 2024-09-03 ASSESSMENT — ENCOUNTER SYMPTOMS
NAIL CHANGES: 0
ABDOMINAL PAIN: 0
SPUTUM PRODUCTION: 1
STRIDOR: 0
COUGH: 1
APHONIA: 0
EYE PAIN: 0

## 2024-09-03 NOTE — PROGRESS NOTES
[facility] [place], [address], [address]  [place] [address]  PHONE: [phone]    SUBJECTIVE:   Lachelle Quintero is a 69 y.o. female 1955   seen for a follow up visit regarding the following:     Chief Complaint   Patient presents with    Coronary Artery Disease     History of present illness: 69 y.o. female presented for follow-up 9/3/24 history of atrial flutter ablation she presented for follow-up 9/3/2024 due to abnormal heart rhythms detected on her most recent monitor.  Additionally she describes episodes of substernal chest discomfort.  At her last appointment with electrophysiology she was placed on diltiazem therapy which she has had causes swelling and has been less successful in controlling her blood pressure.  Additionally she thought she was instructed to stop Aldactazide however based on her after visit summary it appears that this was not discontinued.    Interval Hx:   Patient is very concerned with CAD due to family with history of MI. Back surgery pain no better. Tearful today. Pain.      The patient had a previous history of hypertension, as well as nonobstructive coronary artery disease.  She presented off schedule 04/27/2018.  Unfortunately, she had an episode of angioedema that occurred associated with ACE inhibitor therapy.  The patient  was seen in [facility] Emergency Department.  She was given steroids, as well as Benadryl with resolution.  Her Lisinopril was discontinued.        She presented 11/30/2018 scheduled back surgery possibly due to bulging disc.      Cardiac history:   5/11/2016 Cath mild nonobstructive coronary disease with moderate diffuse calcifications noted.   left main angiographically normal, LAD mild nonobstructive disease with focal stenosis at the origin in the first diagonal branch, 20%  narrowing at the circumflex, small low grade calcification luminal irregularities, right coronary and large PDA with 30% stenosis.      Ranexa initiated

## 2024-09-12 PROBLEM — Z12.11 SCREENING FOR COLON CANCER: Status: RESOLVED | Noted: 2024-08-08 | Resolved: 2024-09-12

## 2024-09-24 ENCOUNTER — OFFICE VISIT (OUTPATIENT)
Dept: FAMILY MEDICINE CLINIC | Facility: CLINIC | Age: 69
End: 2024-09-24
Payer: MEDICARE

## 2024-09-24 VITALS
BODY MASS INDEX: 25.89 KG/M2 | HEART RATE: 66 BPM | HEIGHT: 65 IN | WEIGHT: 155.4 LBS | SYSTOLIC BLOOD PRESSURE: 124 MMHG | OXYGEN SATURATION: 98 % | DIASTOLIC BLOOD PRESSURE: 70 MMHG | TEMPERATURE: 98.1 F

## 2024-09-24 DIAGNOSIS — M54.50 CHRONIC BILATERAL LOW BACK PAIN WITHOUT SCIATICA: ICD-10-CM

## 2024-09-24 DIAGNOSIS — Z86.79 HISTORY OF ATRIAL FLUTTER: ICD-10-CM

## 2024-09-24 DIAGNOSIS — G89.29 CHRONIC BILATERAL LOW BACK PAIN WITHOUT SCIATICA: ICD-10-CM

## 2024-09-24 DIAGNOSIS — E03.9 PRIMARY HYPOTHYROIDISM: ICD-10-CM

## 2024-09-24 DIAGNOSIS — I10 PRIMARY HYPERTENSION: Primary | ICD-10-CM

## 2024-09-24 PROCEDURE — G8399 PT W/DXA RESULTS DOCUMENT: HCPCS | Performed by: FAMILY MEDICINE

## 2024-09-24 PROCEDURE — 3074F SYST BP LT 130 MM HG: CPT | Performed by: FAMILY MEDICINE

## 2024-09-24 PROCEDURE — 3078F DIAST BP <80 MM HG: CPT | Performed by: FAMILY MEDICINE

## 2024-09-24 PROCEDURE — 99213 OFFICE O/P EST LOW 20 MIN: CPT | Performed by: FAMILY MEDICINE

## 2024-09-24 PROCEDURE — 1036F TOBACCO NON-USER: CPT | Performed by: FAMILY MEDICINE

## 2024-09-24 PROCEDURE — 1123F ACP DISCUSS/DSCN MKR DOCD: CPT | Performed by: FAMILY MEDICINE

## 2024-09-24 PROCEDURE — 3017F COLORECTAL CA SCREEN DOC REV: CPT | Performed by: FAMILY MEDICINE

## 2024-09-24 PROCEDURE — G8417 CALC BMI ABV UP PARAM F/U: HCPCS | Performed by: FAMILY MEDICINE

## 2024-09-24 PROCEDURE — G8427 DOCREV CUR MEDS BY ELIG CLIN: HCPCS | Performed by: FAMILY MEDICINE

## 2024-09-24 PROCEDURE — 1090F PRES/ABSN URINE INCON ASSESS: CPT | Performed by: FAMILY MEDICINE

## 2024-09-24 RX ORDER — LEVOTHYROXINE SODIUM 88 UG/1
88 TABLET ORAL
Qty: 90 TABLET | Refills: 3 | Status: SHIPPED | OUTPATIENT
Start: 2024-09-24

## 2024-09-24 ASSESSMENT — ENCOUNTER SYMPTOMS
COUGH: 0
ABDOMINAL DISTENTION: 0
BLURRED VISION: 0
BLOOD IN STOOL: 0
SHORTNESS OF BREATH: 0
COLOR CHANGE: 0
SORE THROAT: 0
ABDOMINAL PAIN: 0
EYE PAIN: 0
CONSTIPATION: 0
PHOTOPHOBIA: 0
NAUSEA: 0

## 2024-10-22 RX ORDER — ATORVASTATIN CALCIUM 40 MG/1
40 TABLET, FILM COATED ORAL DAILY
Qty: 90 TABLET | Refills: 3 | Status: SHIPPED | OUTPATIENT
Start: 2024-10-22 | End: 2025-10-17

## 2024-11-01 ENCOUNTER — LAB (OUTPATIENT)
Dept: FAMILY MEDICINE CLINIC | Facility: CLINIC | Age: 69
End: 2024-11-01

## 2024-11-01 DIAGNOSIS — D50.8 IRON DEFICIENCY ANEMIA SECONDARY TO INADEQUATE DIETARY IRON INTAKE: ICD-10-CM

## 2024-11-01 DIAGNOSIS — E55.9 VITAMIN D DEFICIENCY: ICD-10-CM

## 2024-11-01 DIAGNOSIS — E78.49 OTHER HYPERLIPIDEMIA: ICD-10-CM

## 2024-11-01 DIAGNOSIS — I10 PRIMARY HYPERTENSION: Primary | ICD-10-CM

## 2024-11-01 DIAGNOSIS — E11.9 TYPE 2 DIABETES MELLITUS WITHOUT COMPLICATION, WITHOUT LONG-TERM CURRENT USE OF INSULIN (HCC): ICD-10-CM

## 2024-11-01 LAB
25(OH)D3 SERPL-MCNC: 23.3 NG/ML (ref 30–100)
ALBUMIN SERPL-MCNC: 3.3 G/DL (ref 3.2–4.6)
ALBUMIN/GLOB SERPL: 1.2 (ref 1–1.9)
ALP SERPL-CCNC: 73 U/L (ref 35–104)
ALT SERPL-CCNC: 20 U/L (ref 8–45)
ANION GAP SERPL CALC-SCNC: 8 MMOL/L (ref 7–16)
AST SERPL-CCNC: 23 U/L (ref 15–37)
BASOPHILS # BLD: 0.1 K/UL (ref 0–0.2)
BASOPHILS NFR BLD: 1 % (ref 0–2)
BILIRUB SERPL-MCNC: 0.4 MG/DL (ref 0–1.2)
BUN SERPL-MCNC: 14 MG/DL (ref 8–23)
CALCIUM SERPL-MCNC: 9.2 MG/DL (ref 8.8–10.2)
CHLORIDE SERPL-SCNC: 106 MMOL/L (ref 98–107)
CHOLEST SERPL-MCNC: 128 MG/DL (ref 0–200)
CO2 SERPL-SCNC: 28 MMOL/L (ref 20–29)
CREAT SERPL-MCNC: 1.03 MG/DL (ref 0.6–1.1)
DIFFERENTIAL METHOD BLD: NORMAL
EOSINOPHIL # BLD: 0.1 K/UL (ref 0–0.8)
EOSINOPHIL NFR BLD: 1 % (ref 0.5–7.8)
ERYTHROCYTE [DISTWIDTH] IN BLOOD BY AUTOMATED COUNT: 14.6 % (ref 11.9–14.6)
EST. AVERAGE GLUCOSE BLD GHB EST-MCNC: 127 MG/DL
FERRITIN SERPL-MCNC: 19 NG/ML (ref 8–388)
GLOBULIN SER CALC-MCNC: 2.8 G/DL (ref 2.3–3.5)
GLUCOSE SERPL-MCNC: 93 MG/DL (ref 70–99)
HBA1C MFR BLD: 6.1 % (ref 0–5.6)
HCT VFR BLD AUTO: 36.5 % (ref 35.8–46.3)
HDLC SERPL-MCNC: 62 MG/DL (ref 40–60)
HDLC SERPL: 2.1 (ref 0–5)
HGB BLD-MCNC: 11.7 G/DL (ref 11.7–15.4)
IMM GRANULOCYTES # BLD AUTO: 0 K/UL (ref 0–0.5)
IMM GRANULOCYTES NFR BLD AUTO: 0 % (ref 0–5)
IRON SERPL-MCNC: 45 UG/DL (ref 35–100)
LDLC SERPL CALC-MCNC: 55 MG/DL (ref 0–100)
LYMPHOCYTES # BLD: 2.5 K/UL (ref 0.5–4.6)
LYMPHOCYTES NFR BLD: 29 % (ref 13–44)
MCH RBC QN AUTO: 28.7 PG (ref 26.1–32.9)
MCHC RBC AUTO-ENTMCNC: 32.1 G/DL (ref 31.4–35)
MCV RBC AUTO: 89.5 FL (ref 82–102)
MONOCYTES # BLD: 0.7 K/UL (ref 0.1–1.3)
MONOCYTES NFR BLD: 8 % (ref 4–12)
NEUTS SEG # BLD: 5.1 K/UL (ref 1.7–8.2)
NEUTS SEG NFR BLD: 61 % (ref 43–78)
NRBC # BLD: 0 K/UL (ref 0–0.2)
PLATELET # BLD AUTO: 269 K/UL (ref 150–450)
PMV BLD AUTO: 10.4 FL (ref 9.4–12.3)
POTASSIUM SERPL-SCNC: 3.7 MMOL/L (ref 3.5–5.1)
PROT SERPL-MCNC: 6.1 G/DL (ref 6.3–8.2)
RBC # BLD AUTO: 4.08 M/UL (ref 4.05–5.2)
SODIUM SERPL-SCNC: 143 MMOL/L (ref 136–145)
TRIGL SERPL-MCNC: 57 MG/DL (ref 0–150)
VLDLC SERPL CALC-MCNC: 11 MG/DL (ref 6–23)
WBC # BLD AUTO: 8.4 K/UL (ref 4.3–11.1)

## 2024-11-07 ENCOUNTER — HOSPITAL ENCOUNTER (OUTPATIENT)
Dept: CARDIAC CATH/INVASIVE PROCEDURES | Age: 69
Setting detail: OBSERVATION
Discharge: HOME | End: 2024-11-08
Attending: INTERNAL MEDICINE | Admitting: INTERNAL MEDICINE
Payer: MEDICARE

## 2024-11-07 ENCOUNTER — OFFICE VISIT (OUTPATIENT)
Age: 69
End: 2024-11-07

## 2024-11-07 VITALS
HEART RATE: 157 BPM | HEIGHT: 65 IN | BODY MASS INDEX: 25.16 KG/M2 | SYSTOLIC BLOOD PRESSURE: 118 MMHG | WEIGHT: 151 LBS | DIASTOLIC BLOOD PRESSURE: 76 MMHG

## 2024-11-07 DIAGNOSIS — I48.91 A-FIB (HCC): ICD-10-CM

## 2024-11-07 DIAGNOSIS — I48.4 ATYPICAL ATRIAL FLUTTER (HCC): Primary | ICD-10-CM

## 2024-11-07 DIAGNOSIS — E78.5 HYPERLIPIDEMIA LDL GOAL <70: ICD-10-CM

## 2024-11-07 DIAGNOSIS — I10 HYPERTENSION, ESSENTIAL: ICD-10-CM

## 2024-11-07 DIAGNOSIS — I25.10 ASCVD (ARTERIOSCLEROTIC CARDIOVASCULAR DISEASE): ICD-10-CM

## 2024-11-07 PROBLEM — I48.92 ATRIAL FLUTTER (HCC): Status: ACTIVE | Noted: 2024-11-07

## 2024-11-07 LAB
ANION GAP SERPL CALC-SCNC: 13 MMOL/L (ref 7–16)
BASOPHILS # BLD: 0.1 K/UL (ref 0–0.2)
BASOPHILS NFR BLD: 1 % (ref 0–2)
BUN SERPL-MCNC: 10 MG/DL (ref 8–23)
CALCIUM SERPL-MCNC: 9.8 MG/DL (ref 8.8–10.2)
CHLORIDE SERPL-SCNC: 102 MMOL/L (ref 98–107)
CO2 SERPL-SCNC: 25 MMOL/L (ref 20–29)
CREAT SERPL-MCNC: 1.22 MG/DL (ref 0.6–1.1)
DIFFERENTIAL METHOD BLD: ABNORMAL
EKG ATRIAL RATE: 277 BPM
EKG ATRIAL RATE: 300 BPM
EKG ATRIAL RATE: 300 BPM
EKG DIAGNOSIS: NORMAL
EKG P AXIS: 91 DEGREES
EKG P AXIS: 91 DEGREES
EKG Q-T INTERVAL: 344 MS
EKG Q-T INTERVAL: 372 MS
EKG Q-T INTERVAL: 372 MS
EKG QRS DURATION: 88 MS
EKG QRS DURATION: 92 MS
EKG QRS DURATION: 92 MS
EKG QTC CALCULATION (BAZETT): 424 MS
EKG QTC CALCULATION (BAZETT): 424 MS
EKG QTC CALCULATION (BAZETT): 516 MS
EKG R AXIS: 43 DEGREES
EKG R AXIS: 43 DEGREES
EKG R AXIS: 68 DEGREES
EKG T AXIS: 64 DEGREES
EKG T AXIS: 64 DEGREES
EKG T AXIS: 73 DEGREES
EKG VENTRICULAR RATE: 135 BPM
EKG VENTRICULAR RATE: 78 BPM
EKG VENTRICULAR RATE: 78 BPM
EOSINOPHIL # BLD: 0 K/UL (ref 0–0.8)
EOSINOPHIL NFR BLD: 0 % (ref 0.5–7.8)
ERYTHROCYTE [DISTWIDTH] IN BLOOD BY AUTOMATED COUNT: 14.8 % (ref 11.9–14.6)
GLUCOSE SERPL-MCNC: 94 MG/DL (ref 70–99)
HCT VFR BLD AUTO: 40 % (ref 35.8–46.3)
HGB BLD-MCNC: 12.8 G/DL (ref 11.7–15.4)
IMM GRANULOCYTES # BLD AUTO: 0 K/UL (ref 0–0.5)
IMM GRANULOCYTES NFR BLD AUTO: 0 % (ref 0–5)
LYMPHOCYTES # BLD: 2.4 K/UL (ref 0.5–4.6)
LYMPHOCYTES NFR BLD: 23 % (ref 13–44)
MAGNESIUM SERPL-MCNC: 1.8 MG/DL (ref 1.8–2.4)
MCH RBC QN AUTO: 28.1 PG (ref 26.1–32.9)
MCHC RBC AUTO-ENTMCNC: 32 G/DL (ref 31.4–35)
MCV RBC AUTO: 87.9 FL (ref 82–102)
MONOCYTES # BLD: 0.7 K/UL (ref 0.1–1.3)
MONOCYTES NFR BLD: 7 % (ref 4–12)
NEUTS SEG # BLD: 7.3 K/UL (ref 1.7–8.2)
NEUTS SEG NFR BLD: 69 % (ref 43–78)
NRBC # BLD: 0 K/UL (ref 0–0.2)
PLATELET # BLD AUTO: 341 K/UL (ref 150–450)
PMV BLD AUTO: 10.3 FL (ref 9.4–12.3)
POTASSIUM SERPL-SCNC: 3.9 MMOL/L (ref 3.5–5.1)
RBC # BLD AUTO: 4.55 M/UL (ref 4.05–5.2)
SODIUM SERPL-SCNC: 140 MMOL/L (ref 136–145)
WBC # BLD AUTO: 10.6 K/UL (ref 4.3–11.1)

## 2024-11-07 PROCEDURE — 99153 MOD SED SAME PHYS/QHP EA: CPT | Performed by: INTERNAL MEDICINE

## 2024-11-07 PROCEDURE — 99152 MOD SED SAME PHYS/QHP 5/>YRS: CPT | Performed by: INTERNAL MEDICINE

## 2024-11-07 PROCEDURE — 96365 THER/PROPH/DIAG IV INF INIT: CPT

## 2024-11-07 PROCEDURE — 93319 3D ECHO IMG CGEN CAR ANOMAL: CPT

## 2024-11-07 PROCEDURE — 2580000003 HC RX 258: Performed by: INTERNAL MEDICINE

## 2024-11-07 PROCEDURE — 2500000003 HC RX 250 WO HCPCS: Performed by: INTERNAL MEDICINE

## 2024-11-07 PROCEDURE — 83735 ASSAY OF MAGNESIUM: CPT

## 2024-11-07 PROCEDURE — 92960 CARDIOVERSION ELECTRIC EXT: CPT

## 2024-11-07 PROCEDURE — 6360000002 HC RX W HCPCS: Performed by: NURSE PRACTITIONER

## 2024-11-07 PROCEDURE — 6360000002 HC RX W HCPCS: Performed by: INTERNAL MEDICINE

## 2024-11-07 PROCEDURE — 99222 1ST HOSP IP/OBS MODERATE 55: CPT | Performed by: INTERNAL MEDICINE

## 2024-11-07 PROCEDURE — 2580000003 HC RX 258: Performed by: NURSE PRACTITIONER

## 2024-11-07 PROCEDURE — G0378 HOSPITAL OBSERVATION PER HR: HCPCS

## 2024-11-07 PROCEDURE — 93010 ELECTROCARDIOGRAM REPORT: CPT | Performed by: INTERNAL MEDICINE

## 2024-11-07 PROCEDURE — 6370000000 HC RX 637 (ALT 250 FOR IP): Performed by: NURSE PRACTITIONER

## 2024-11-07 PROCEDURE — 99153 MOD SED SAME PHYS/QHP EA: CPT

## 2024-11-07 PROCEDURE — 96376 TX/PRO/DX INJ SAME DRUG ADON: CPT

## 2024-11-07 PROCEDURE — 6370000000 HC RX 637 (ALT 250 FOR IP): Performed by: INTERNAL MEDICINE

## 2024-11-07 PROCEDURE — 80048 BASIC METABOLIC PNL TOTAL CA: CPT

## 2024-11-07 PROCEDURE — 96366 THER/PROPH/DIAG IV INF ADDON: CPT

## 2024-11-07 PROCEDURE — 93005 ELECTROCARDIOGRAM TRACING: CPT | Performed by: INTERNAL MEDICINE

## 2024-11-07 PROCEDURE — 99152 MOD SED SAME PHYS/QHP 5/>YRS: CPT

## 2024-11-07 PROCEDURE — 96368 THER/DIAG CONCURRENT INF: CPT

## 2024-11-07 PROCEDURE — 85025 COMPLETE CBC W/AUTO DIFF WBC: CPT

## 2024-11-07 PROCEDURE — 94640 AIRWAY INHALATION TREATMENT: CPT

## 2024-11-07 RX ORDER — MAGNESIUM SULFATE IN WATER 40 MG/ML
2000 INJECTION, SOLUTION INTRAVENOUS PRN
Status: DISCONTINUED | OUTPATIENT
Start: 2024-11-07 | End: 2024-11-08 | Stop reason: HOSPADM

## 2024-11-07 RX ORDER — BUDESONIDE 0.5 MG/2ML
0.5 INHALANT ORAL
Status: DISCONTINUED | OUTPATIENT
Start: 2024-11-07 | End: 2024-11-08 | Stop reason: HOSPADM

## 2024-11-07 RX ORDER — METOPROLOL SUCCINATE 100 MG/1
100 TABLET, EXTENDED RELEASE ORAL DAILY
Status: DISCONTINUED | OUTPATIENT
Start: 2024-11-08 | End: 2024-11-08 | Stop reason: HOSPADM

## 2024-11-07 RX ORDER — PANTOPRAZOLE SODIUM 40 MG/1
40 TABLET, DELAYED RELEASE ORAL
Status: DISCONTINUED | OUTPATIENT
Start: 2024-11-08 | End: 2024-11-08 | Stop reason: HOSPADM

## 2024-11-07 RX ORDER — FENTANYL CITRATE 50 UG/ML
INJECTION, SOLUTION INTRAMUSCULAR; INTRAVENOUS PRN
Status: COMPLETED | OUTPATIENT
Start: 2024-11-07 | End: 2024-11-07

## 2024-11-07 RX ORDER — LEVOTHYROXINE SODIUM 88 UG/1
88 TABLET ORAL
Status: DISCONTINUED | OUTPATIENT
Start: 2024-11-08 | End: 2024-11-08 | Stop reason: HOSPADM

## 2024-11-07 RX ORDER — IPRATROPIUM BROMIDE AND ALBUTEROL SULFATE 2.5; .5 MG/3ML; MG/3ML
1 SOLUTION RESPIRATORY (INHALATION) EVERY 4 HOURS PRN
Status: DISCONTINUED | OUTPATIENT
Start: 2024-11-07 | End: 2024-11-08 | Stop reason: HOSPADM

## 2024-11-07 RX ORDER — LIDOCAINE HYDROCHLORIDE 20 MG/ML
SOLUTION OROPHARYNGEAL PRN
Status: COMPLETED | OUTPATIENT
Start: 2024-11-07 | End: 2024-11-07

## 2024-11-07 RX ORDER — POTASSIUM CHLORIDE 1500 MG/1
40 TABLET, EXTENDED RELEASE ORAL PRN
Status: DISCONTINUED | OUTPATIENT
Start: 2024-11-07 | End: 2024-11-08 | Stop reason: HOSPADM

## 2024-11-07 RX ORDER — ONDANSETRON 2 MG/ML
4 INJECTION INTRAMUSCULAR; INTRAVENOUS EVERY 6 HOURS PRN
Status: DISCONTINUED | OUTPATIENT
Start: 2024-11-07 | End: 2024-11-08 | Stop reason: HOSPADM

## 2024-11-07 RX ORDER — SODIUM CHLORIDE 0.9 % (FLUSH) 0.9 %
5-40 SYRINGE (ML) INJECTION EVERY 12 HOURS SCHEDULED
Status: DISCONTINUED | OUTPATIENT
Start: 2024-11-07 | End: 2024-11-08 | Stop reason: HOSPADM

## 2024-11-07 RX ORDER — LANOLIN ALCOHOL/MO/W.PET/CERES
400 CREAM (GRAM) TOPICAL DAILY
Status: DISCONTINUED | OUTPATIENT
Start: 2024-11-07 | End: 2024-11-08 | Stop reason: HOSPADM

## 2024-11-07 RX ORDER — POTASSIUM CHLORIDE 7.45 MG/ML
10 INJECTION INTRAVENOUS PRN
Status: DISCONTINUED | OUTPATIENT
Start: 2024-11-07 | End: 2024-11-08 | Stop reason: HOSPADM

## 2024-11-07 RX ORDER — SPIRONOLACTONE AND HYDROCHLOROTHIAZIDE 25; 25 MG/1; MG/1
1 TABLET ORAL DAILY
Status: DISCONTINUED | OUTPATIENT
Start: 2024-11-07 | End: 2024-11-07 | Stop reason: SDUPTHER

## 2024-11-07 RX ORDER — SODIUM CHLORIDE 9 MG/ML
INJECTION, SOLUTION INTRAVENOUS PRN
Status: DISCONTINUED | OUTPATIENT
Start: 2024-11-07 | End: 2024-11-08 | Stop reason: HOSPADM

## 2024-11-07 RX ORDER — BUDESONIDE AND FORMOTEROL FUMARATE DIHYDRATE 160; 4.5 UG/1; UG/1
2 AEROSOL RESPIRATORY (INHALATION)
Status: DISCONTINUED | OUTPATIENT
Start: 2024-11-07 | End: 2024-11-07 | Stop reason: SDUPTHER

## 2024-11-07 RX ORDER — MAGNESIUM SULFATE IN WATER 40 MG/ML
2000 INJECTION, SOLUTION INTRAVENOUS ONCE
Status: COMPLETED | OUTPATIENT
Start: 2024-11-07 | End: 2024-11-07

## 2024-11-07 RX ORDER — ROFLUMILAST 500 UG/1
500 TABLET ORAL DAILY
Status: DISCONTINUED | OUTPATIENT
Start: 2024-11-07 | End: 2024-11-08 | Stop reason: HOSPADM

## 2024-11-07 RX ORDER — HYDROCHLOROTHIAZIDE 25 MG/1
25 TABLET ORAL DAILY
Status: DISCONTINUED | OUTPATIENT
Start: 2024-11-07 | End: 2024-11-08

## 2024-11-07 RX ORDER — SODIUM CHLORIDE 0.9 % (FLUSH) 0.9 %
5-40 SYRINGE (ML) INJECTION PRN
Status: DISCONTINUED | OUTPATIENT
Start: 2024-11-07 | End: 2024-11-08 | Stop reason: HOSPADM

## 2024-11-07 RX ORDER — MAGNESIUM HYDROXIDE/ALUMINUM HYDROXICE/SIMETHICONE 120; 1200; 1200 MG/30ML; MG/30ML; MG/30ML
30 SUSPENSION ORAL EVERY 6 HOURS PRN
Status: DISCONTINUED | OUTPATIENT
Start: 2024-11-07 | End: 2024-11-08 | Stop reason: HOSPADM

## 2024-11-07 RX ORDER — ATORVASTATIN CALCIUM 40 MG/1
40 TABLET, FILM COATED ORAL NIGHTLY
Status: DISCONTINUED | OUTPATIENT
Start: 2024-11-07 | End: 2024-11-08 | Stop reason: HOSPADM

## 2024-11-07 RX ORDER — ZOLPIDEM TARTRATE 5 MG/1
10 TABLET ORAL NIGHTLY
Status: DISCONTINUED | OUTPATIENT
Start: 2024-11-07 | End: 2024-11-08 | Stop reason: HOSPADM

## 2024-11-07 RX ORDER — FLECAINIDE ACETATE 50 MG/1
50 TABLET ORAL 2 TIMES DAILY
Status: DISCONTINUED | OUTPATIENT
Start: 2024-11-07 | End: 2024-11-08

## 2024-11-07 RX ORDER — ACETAMINOPHEN 650 MG/1
650 SUPPOSITORY RECTAL EVERY 6 HOURS PRN
Status: DISCONTINUED | OUTPATIENT
Start: 2024-11-07 | End: 2024-11-08 | Stop reason: HOSPADM

## 2024-11-07 RX ORDER — POLYETHYLENE GLYCOL 3350 17 G/17G
17 POWDER, FOR SOLUTION ORAL DAILY PRN
Status: DISCONTINUED | OUTPATIENT
Start: 2024-11-07 | End: 2024-11-08 | Stop reason: HOSPADM

## 2024-11-07 RX ORDER — ACETAMINOPHEN 325 MG/1
650 TABLET ORAL EVERY 6 HOURS PRN
Status: DISCONTINUED | OUTPATIENT
Start: 2024-11-07 | End: 2024-11-08 | Stop reason: HOSPADM

## 2024-11-07 RX ORDER — MIDAZOLAM HYDROCHLORIDE 1 MG/ML
INJECTION, SOLUTION INTRAMUSCULAR; INTRAVENOUS PRN
Status: COMPLETED | OUTPATIENT
Start: 2024-11-07 | End: 2024-11-07

## 2024-11-07 RX ORDER — SPIRONOLACTONE 25 MG/1
25 TABLET ORAL DAILY
Status: DISCONTINUED | OUTPATIENT
Start: 2024-11-07 | End: 2024-11-08

## 2024-11-07 RX ORDER — METOPROLOL TARTRATE 1 MG/ML
5 INJECTION, SOLUTION INTRAVENOUS EVERY 6 HOURS PRN
Status: DISCONTINUED | OUTPATIENT
Start: 2024-11-07 | End: 2024-11-08 | Stop reason: HOSPADM

## 2024-11-07 RX ORDER — OXYCODONE HYDROCHLORIDE 5 MG/1
15 TABLET ORAL EVERY 6 HOURS PRN
Status: DISCONTINUED | OUTPATIENT
Start: 2024-11-07 | End: 2024-11-08 | Stop reason: HOSPADM

## 2024-11-07 RX ORDER — ARFORMOTEROL TARTRATE 15 UG/2ML
15 SOLUTION RESPIRATORY (INHALATION)
Status: DISCONTINUED | OUTPATIENT
Start: 2024-11-07 | End: 2024-11-08 | Stop reason: HOSPADM

## 2024-11-07 RX ORDER — ONDANSETRON 4 MG/1
4 TABLET, ORALLY DISINTEGRATING ORAL EVERY 8 HOURS PRN
Status: DISCONTINUED | OUTPATIENT
Start: 2024-11-07 | End: 2024-11-08 | Stop reason: HOSPADM

## 2024-11-07 RX ADMIN — MIDAZOLAM 1 MG: 1 INJECTION INTRAMUSCULAR; INTRAVENOUS at 14:16

## 2024-11-07 RX ADMIN — OXYCODONE 15 MG: 5 TABLET ORAL at 20:16

## 2024-11-07 RX ADMIN — MIDAZOLAM 1 MG: 1 INJECTION INTRAMUSCULAR; INTRAVENOUS at 14:09

## 2024-11-07 RX ADMIN — SPIRONOLACTONE 25 MG: 25 TABLET ORAL at 17:14

## 2024-11-07 RX ADMIN — FENTANYL CITRATE 25 MCG: 50 INJECTION, SOLUTION INTRAMUSCULAR; INTRAVENOUS at 14:16

## 2024-11-07 RX ADMIN — FENTANYL CITRATE 25 MCG: 50 INJECTION, SOLUTION INTRAMUSCULAR; INTRAVENOUS at 14:21

## 2024-11-07 RX ADMIN — SODIUM CHLORIDE, PRESERVATIVE FREE 10 ML: 5 INJECTION INTRAVENOUS at 20:11

## 2024-11-07 RX ADMIN — MAGNESIUM GLUCONATE 500 MG ORAL TABLET 400 MG: 500 TABLET ORAL at 17:14

## 2024-11-07 RX ADMIN — IPRATROPIUM BROMIDE 0.5 MG: 0.5 SOLUTION RESPIRATORY (INHALATION) at 19:11

## 2024-11-07 RX ADMIN — FENTANYL CITRATE 25 MCG: 50 INJECTION, SOLUTION INTRAMUSCULAR; INTRAVENOUS at 14:06

## 2024-11-07 RX ADMIN — MIDAZOLAM 2 MG: 1 INJECTION INTRAMUSCULAR; INTRAVENOUS at 14:05

## 2024-11-07 RX ADMIN — AMIODARONE HYDROCHLORIDE 0.5 MG/MIN: 50 INJECTION, SOLUTION INTRAVENOUS at 20:49

## 2024-11-07 RX ADMIN — MIDAZOLAM 1 MG: 1 INJECTION INTRAMUSCULAR; INTRAVENOUS at 14:21

## 2024-11-07 RX ADMIN — MAGNESIUM SULFATE HEPTAHYDRATE 2000 MG: 40 INJECTION, SOLUTION INTRAVENOUS at 17:59

## 2024-11-07 RX ADMIN — ATORVASTATIN CALCIUM 40 MG: 40 TABLET, FILM COATED ORAL at 20:11

## 2024-11-07 RX ADMIN — MIDAZOLAM 2 MG: 1 INJECTION INTRAMUSCULAR; INTRAVENOUS at 14:07

## 2024-11-07 RX ADMIN — APIXABAN 5 MG: 5 TABLET, FILM COATED ORAL at 20:11

## 2024-11-07 RX ADMIN — LIDOCAINE HYDROCHLORIDE 15 ML: 20 SOLUTION ORAL at 14:00

## 2024-11-07 RX ADMIN — AMIODARONE HYDROCHLORIDE 150 MG: 1.5 INJECTION, SOLUTION INTRAVENOUS at 15:51

## 2024-11-07 RX ADMIN — HYDROCHLOROTHIAZIDE 25 MG: 25 TABLET ORAL at 17:14

## 2024-11-07 RX ADMIN — AMIODARONE HYDROCHLORIDE 1 MG/MIN: 50 INJECTION, SOLUTION INTRAVENOUS at 16:05

## 2024-11-07 RX ADMIN — ARFORMOTEROL TARTRATE 15 MCG: 15 SOLUTION RESPIRATORY (INHALATION) at 19:11

## 2024-11-07 RX ADMIN — ZOLPIDEM TARTRATE 10 MG: 5 TABLET, COATED ORAL at 20:11

## 2024-11-07 RX ADMIN — BUDESONIDE INHALATION 500 MCG: 0.5 SUSPENSION RESPIRATORY (INHALATION) at 19:11

## 2024-11-07 ASSESSMENT — PAIN DESCRIPTION - LOCATION: LOCATION: BACK

## 2024-11-07 ASSESSMENT — PAIN DESCRIPTION - DESCRIPTORS: DESCRIPTORS: ACHING;DISCOMFORT

## 2024-11-07 ASSESSMENT — PAIN SCALES - GENERAL: PAINLEVEL_OUTOF10: 10

## 2024-11-07 NOTE — FLOWSHEET NOTE
11/07/24 1528   Dual Clinician Skin Assessment   Dual Skin Assessment (4 Eyes) X   Second Clinical  (First and Last Name) Odalis MCGEE   Skin Integumentary    Skin Integumentary (WDL) X   Skin Color Ecchymosis (comment);Pale   Skin Condition/Temp Warm;Dry;Flaky;Fragile   Skin Integrity Ecchymosis;Scars (comment)   Location scattered     4 Eyes Skin Assessment     NAME:  Lachelle Quintero  YOB: 1955  MEDICAL RECORD NUMBER:  [id number]    The patient is being assessed for  {Reason for Assessment:48797}    I agree that at least one RN has performed a thorough Head to Toe Skin Assessment on the patient. ALL assessment sites listed below have been assessed.      Areas assessed by both nurses:    Head, Face, Ears, Shoulders, Back, Chest, Arms, Elbows, Hands, Sacrum. Buttock, Coccyx, Ischium, Legs. Feet and Heels, and Under Medical Devices         Does the Patient have a Wound? No noted wound(s)       Lonnie Prevention initiated by RN: Yes  Wound Care Orders initiated by RN: No    Pressure Injury (Stage 3,4, Unstageable, DTI, NWPT, and Complex wounds) if present, place Wound referral order by RN under : No    New Ostomies, if present place, Ostomy referral order under : No     Nurse 1 eSignature: Electronically signed by Charis Hong RN on 11/7/24 at 4:33 PM EST    **SHARE this note so that the co-signing nurse can place an eSignature**    Nurse 2 eSignature: {Esignature:[id number]}      Dual skin assessment preformed with Odalis MCGEE upon pt arrival to the floor.  Pt  has scattered scarring and bruising throughout all extremities and trunk.  Heels and sacrum intact.  No other redness, breakdown, or impairments noted.

## 2024-11-07 NOTE — PROGRESS NOTES
SUSHILA/CVN by Dr Buchanan  II ASA II Mallampati  7mg versed  75mcg fentanyl  Viscous Solution given at 14:00  1 shock at 100 joules synced  Post cardioversion rhythm Aflutter  2nd shock at 200 joules synced  Post cardioversion Aflutter  3rd shock at 360 joules synced  Post cardioversion Aflutter  Pt tolerated well.

## 2024-11-07 NOTE — H&P
[facility], [address], [address]  [place] [address]  PHONE: [phone]    SUBJECTIVE:   Lachelle Quintero is a 69 y.o. female 1955   seen for a follow up visit regarding the following:     No chief complaint on file.        History of Present Illness  The patient presents for evaluation of multiple medical concerns. She is accompanied by her daughter.    She was scheduled for a routine colonoscopy and endoscopy today, but the procedures were canceled due to an elevated heart rate detected during an EKG. She has been experiencing intermittent episodes of rapid heart rate, which she believes may be related to her flecainide medication. Despite taking flecainide, her heart rate continues to fluctuate throughout the day. She recalls feeling shaky upon waking up this morning and noticed her heart racing after taking the prep for her procedures yesterday.     She has a stress test scheduled for Tuesday and an appointment with an electrophysiologist in December 2024. She has not taken her Eliquis since Sunday due to the planned procedures.    She reports no symptoms of anemia or blood loss, such as dark stools.        Interval history:  Patient is very concerned with CAD due to family with history of MI. Back surgery pain no better. Tearful today. Pain.     The patient had a previous history of hypertension, as well as nonobstructive coronary artery disease.  She presented off schedule 04/27/2018.  Unfortunately, she had an episode of angioedema that occurred associated with ACE inhibitor therapy.  The patient  was seen in [facility] Emergency Department.  She was given steroids, as well as Benadryl with resolution.  Her Lisinopril was discontinued.        She presented 11/30/2018 scheduled back surgery possibly due to bulging disc.      Cardiac history:   5/11/2016 Cath mild nonobstructive coronary disease with moderate diffuse calcifications noted.   left main  Glom Filt Rate 48 (L) >60 ml/min/1.73m2    Calcium 9.8 8.8 - 10.2 MG/DL   CBC with Auto Differential   Result Value Ref Range    WBC 10.6 4.3 - 11.1 K/uL    RBC 4.55 4.05 - 5.2 M/uL    Hemoglobin 12.8 11.7 - 15.4 g/dL    Hematocrit 40.0 35.8 - 46.3 %    MCV 87.9 82 - 102 FL    MCH 28.1 26.1 - 32.9 PG    MCHC 32.0 31.4 - 35.0 g/dL    RDW 14.8 (H) 11.9 - 14.6 %    Platelets 341 150 - 450 K/uL    MPV 10.3 9.4 - 12.3 FL    nRBC 0.00 0.0 - 0.2 K/uL    Differential Type AUTOMATED      Neutrophils % 69 43 - 78 %    Lymphocytes % 23 13 - 44 %    Monocytes % 7 4.0 - 12.0 %    Eosinophils % 0 (L) 0.5 - 7.8 %    Basophils % 1 0.0 - 2.0 %    Immature Granulocytes % 0 0.0 - 5.0 %    Neutrophils Absolute 7.3 1.7 - 8.2 K/UL    Lymphocytes Absolute 2.4 0.5 - 4.6 K/UL    Monocytes Absolute 0.7 0.1 - 1.3 K/UL    Eosinophils Absolute 0.0 0.0 - 0.8 K/UL    Basophils Absolute 0.1 0.0 - 0.2 K/UL    Immature Granulocytes Absolute 0.0 0.0 - 0.5 K/UL   Magnesium   Result Value Ref Range    Magnesium 1.8 1.8 - 2.4 mg/dL   EKG 12 Lead   Result Value Ref Range    Ventricular Rate 78 BPM    Atrial Rate 300 BPM    QRS Duration 92 ms    Q-T Interval 372 ms    QTc Calculation (Bazett) 424 ms    P Axis 91 degrees    R Axis 43 degrees    T Axis 64 degrees    Diagnosis       Atrial flutter with variable A-V block  Abnormal ECG  When compared with ECG of 17-NOV-2023 08:14,  Atrial flutter has replaced Sinus rhythm     EKG 12 Lead   Result Value Ref Range    Ventricular Rate 78 BPM    Atrial Rate 300 BPM    QRS Duration 92 ms    Q-T Interval 372 ms    QTc Calculation (Bazett) 424 ms    P Axis 91 degrees    R Axis 43 degrees    T Axis 64 degrees    Diagnosis       Atrial flutter with variable A-V block  Abnormal ECG  When compared with ECG of 17-NOV-2023 08:14,  Atrial flutter has replaced Sinus rhythm     EKG 12 Lead   Result Value Ref Range    Ventricular Rate 135 BPM    Atrial Rate 277 BPM    QRS Duration 88 ms    Q-T Interval 344 ms    QTc  Calculation (Bazett) 516 ms    R Axis 68 degrees    T Axis 73 degrees    Diagnosis       Atrial flutter with variable A-V block  Abnormal ECG  When compared with ECG of 07-NOV-2024 13:08,  Vent. rate has increased BY  57 BPM     SUSHILA with possible cardioversion (PRN contrast/bubble/3D)   Result Value Ref Range    Body Surface Area 1.77 m2           PLAN  Lachelle was seen today for atrial fibrillation.    Diagnoses and all orders for this visit:    Atypical atrial flutter (HCC)  -  She underwent unsuccessful SUSHILA guided cardioversion x3.   -Admit overnight and load with Amiodarone with plans for repeat CV tomorrow.  -NPO at midnight.    ASCVD (arteriosclerotic cardiovascular disease)    Hypertension, essential    Hyperlipidemia LDL goal <70          MANE Barahona - NP  11/7/2024  2:49 PM      The patient (or guardian, if applicable) and other individuals in attendance with the patient were advised that Artificial Intelligence will be utilized during this visit to record, process the conversation to generate a clinical note, and support improvement of the AI technology. The patient (or guardian, if applicable) and other individuals in attendance at the appointment consented to the use of AI, including the recording.        Attending attestation:   Patient personally seen and examined with Marcelino Agudelo NP.  Medical decision making as per assessment and plan as below.  69-year-old female with a history of paroxysmal atrial fibrillation, mild nonobstructive CAD, HTN, HLD who presented for colonoscopy on 11/7/2024, found to be in regular tachycardia appearing consistent with due to 1 atrial flutter.  Colonoscopy procedure canceled she was seen in the office today and transferred to the hospital for SUSHILA guided cardioversion.  Eliquis was given in the office prior to SUSHILA cardioversion.  Unsuccessful cardioversion were performed 11/7/2024 with patient remaining in atrial fibrillation.  She we will be

## 2024-11-07 NOTE — PROGRESS NOTES
[facility], [address], [address]  [place] [address]  PHONE: [phone]    SUBJECTIVE:   Lachelle Quintero is a 69 y.o. female 1955   seen for a follow up visit regarding the following:     Chief Complaint   Patient presents with    Atrial Fibrillation         History of Present Illness  The patient presents for evaluation of multiple medical concerns. She is accompanied by her daughter.    She was scheduled for a routine colonoscopy and endoscopy today, but the procedures were canceled due to an elevated heart rate detected during an EKG. She has been experiencing intermittent episodes of rapid heart rate, which she believes may be related to her flecainide medication. Despite taking flecainide, her heart rate continues to fluctuate throughout the day. She recalls feeling shaky upon waking up this morning and noticed her heart racing after taking the prep for her procedures yesterday.     She has a stress test scheduled for Tuesday and an appointment with an electrophysiologist in December 2024. She has not taken her Eliquis since Sunday due to the planned procedures.    She reports no symptoms of anemia or blood loss, such as dark stools.        Interval history:  Patient is very concerned with CAD due to family with history of MI. Back surgery pain no better. Tearful today. Pain.     The patient had a previous history of hypertension, as well as nonobstructive coronary artery disease.  She presented off schedule 04/27/2018.  Unfortunately, she had an episode of angioedema that occurred associated with ACE inhibitor therapy.  The patient  was seen in [facility] Emergency Department.  She was given steroids, as well as Benadryl with resolution.  Her Lisinopril was discontinued.        She presented 11/30/2018 scheduled back surgery possibly due to bulging disc.      Cardiac history:   5/11/2016 Cath mild nonobstructive coronary disease with moderate diffuse

## 2024-11-07 NOTE — PROGRESS NOTES
Patient received to CPRU room # 16  Ambulatory from [facility]. Patient scheduled for SUSHILA/CVN today with Dr Buchanan. Procedure reviewed & questions answered, voiced good understanding consent obtained & placed on chart. All medications and medical history reviewed. Will prep patient per orders. Patient & family updated on plan of care.      The patient has a fraility score of 3-MANAGING WELL, based on ambulation.

## 2024-11-07 NOTE — PROGRESS NOTES
TRANSFER - OUT REPORT:    Verbal report given to ARELY Vizcaino on Lachelle Quintero being transferred to [facility] for routine progression of patient care       Report consisted of patient’s Situation, Background, Assessment and Recommendations (SBAR).     Information from the following report(s) SBAR, Kardex, Procedure Summary, and Cardiac Rhythm Aflutter  was reviewed with the receiving nurse.    Opportunity for questions and clarification was provided.

## 2024-11-07 NOTE — PROGRESS NOTES
Transesophageal Echo Note  - Indication: atrial flutter with RVR.   - pt underwent successful SUSHILA today in cath holding  - start 1405  - stop 1418  - sedation: 7 mg IV Midazolam, 75 mcg Fentanyl IV given by Russell Peguero RN under my direct supervision. Direct monitoring of vital signs and respiratory status throughout the procedure.   - An independent trained observer pushed medications at my direction. We monitored the patient's level of consciousness and vital signs/physiologic status throughout the procedure duration (see start and stop times above).   - No complications, pt in stable condition  - SUSHILA Brief Findings: no LA/RACHNA thrombus, LVEF normal range, LVH. Trace MR. 3D images obtained.   - Complete SUSHILA report to follow.   - appropriate for cardioversion attempt.     Direct Current Cardioversion  - Indication : atrial fibrillation / atrial flutter   - synchronized DC Cardioversion 100 J x 1 with brief sinus beat quick return of Atrial fibrillation, 200J x 1 with no change. 360 J x 1 with no change in rhythm.    - Results: atrial fibrillation.   - 12 lead EKG pending at this time  - OK for oral intake at 1618.  - No driving or heavy machine operation for 24 hours.   - Results and medication changes discussed with patient and family at bedside  -Patient will be admitted to telemetry unit , cardiology service for IV amiodarone infusion.  N.p.o. midnight for possible repeat cardioversion tomorrow if she does not convert to sinus rhythm.

## 2024-11-07 NOTE — DISCHARGE INSTRUCTIONS
The patient is being discharged home in stable condition on a no/low caffeine, no/low alcohol, low saturated fat, low cholesterol and low salt diet. The patient is instructed to advance activities as tolerated to the limit of fatigue or shortness of breath. The patient is instructed to call the office or return to the ER for immediate evaluation for any severe shortness of breath, chest pain, prolonged palpitations, near syncope or syncope. The patient is instructed to monitor for signs of GI bleeding including dark, black, tarry or bloody stools and if noted is to call our office immediately.      Electrical Cardioversion: What to Expect at Home  Your recovery     Electrical cardioversion is a treatment for an abnormal heartbeat, such as atrial fibrillation, supraventricular tachycardia, or ventricular tachycardia (VT). Your doctor used a brief electrical shock to reset your heart's rhythm.  After the procedure, you may have redness, like a sunburn, where the patches were. The medicines you got to make you sleepy may make you feel drowsy for the rest of the day. You may feel soreness or discomfort in your chest wall for a few days.  Your doctor may have you take medicines to help the heart beat normally and to prevent blood clots.  This care sheet gives you a general idea about how long it will take for you to recover. But each person recovers at a different pace. Follow the steps below to feel better as quickly as possible.  How can you care for yourself at home?  Medicines    If the doctor gave you a sedative:  For 24 hours, don't do anything that requires attention to detail. It takes time for the medicine's effects to completely wear off.  For your safety, do not drive or operate any machinery that could be dangerous. Wait until the medicine wears off and you can think clearly and react easily.     Be safe with medicines. Take your medicines exactly as prescribed. Call your doctor if you think you are having a

## 2024-11-08 VITALS
HEIGHT: 65 IN | WEIGHT: 151 LBS | HEART RATE: 63 BPM | SYSTOLIC BLOOD PRESSURE: 133 MMHG | BODY MASS INDEX: 25.16 KG/M2 | TEMPERATURE: 99 F | RESPIRATION RATE: 16 BRPM | OXYGEN SATURATION: 98 % | DIASTOLIC BLOOD PRESSURE: 56 MMHG

## 2024-11-08 PROBLEM — I25.10 CORONARY ARTERY DISEASE INVOLVING NATIVE CORONARY ARTERY OF NATIVE HEART WITHOUT ANGINA PECTORIS: Status: ACTIVE | Noted: 2022-03-14

## 2024-11-08 PROBLEM — I48.0 PAROXYSMAL ATRIAL FIBRILLATION (HCC): Status: ACTIVE | Noted: 2024-11-08

## 2024-11-08 PROBLEM — I48.92 ATRIAL FLUTTER WITH RAPID VENTRICULAR RESPONSE (HCC): Status: ACTIVE | Noted: 2024-11-08

## 2024-11-08 LAB
ANION GAP SERPL CALC-SCNC: 9 MMOL/L (ref 7–16)
BUN SERPL-MCNC: 16 MG/DL (ref 8–23)
CALCIUM SERPL-MCNC: 9 MG/DL (ref 8.8–10.2)
CHLORIDE SERPL-SCNC: 101 MMOL/L (ref 98–107)
CO2 SERPL-SCNC: 27 MMOL/L (ref 20–29)
CREAT SERPL-MCNC: 1.31 MG/DL (ref 0.6–1.1)
ECHO AO SINUS VALSALVA DIAM: 3.1 CM
ECHO AO SINUS VALSALVA INDEX: 1.76 CM/M2
ECHO BSA: 1.77 M2
ECHO LV IVSD: 1.6 CM (ref 0.6–0.9)
EKG ATRIAL RATE: 59 BPM
EKG DIAGNOSIS: NORMAL
EKG P AXIS: 32 DEGREES
EKG P-R INTERVAL: 186 MS
EKG Q-T INTERVAL: 448 MS
EKG QRS DURATION: 88 MS
EKG QTC CALCULATION (BAZETT): 443 MS
EKG R AXIS: 39 DEGREES
EKG T AXIS: 61 DEGREES
EKG VENTRICULAR RATE: 59 BPM
ERYTHROCYTE [DISTWIDTH] IN BLOOD BY AUTOMATED COUNT: 15 % (ref 11.9–14.6)
GLUCOSE SERPL-MCNC: 102 MG/DL (ref 70–99)
HCT VFR BLD AUTO: 33.4 % (ref 35.8–46.3)
HGB BLD-MCNC: 10.8 G/DL (ref 11.7–15.4)
MAGNESIUM SERPL-MCNC: 2.3 MG/DL (ref 1.8–2.4)
MCH RBC QN AUTO: 28.9 PG (ref 26.1–32.9)
MCHC RBC AUTO-ENTMCNC: 32.3 G/DL (ref 31.4–35)
MCV RBC AUTO: 89.3 FL (ref 82–102)
NRBC # BLD: 0 K/UL (ref 0–0.2)
PLATELET # BLD AUTO: 235 K/UL (ref 150–450)
PMV BLD AUTO: 10.4 FL (ref 9.4–12.3)
POTASSIUM SERPL-SCNC: 3.9 MMOL/L (ref 3.5–5.1)
RBC # BLD AUTO: 3.74 M/UL (ref 4.05–5.2)
SODIUM SERPL-SCNC: 137 MMOL/L (ref 136–145)
TSH W FREE THYROID IF ABNORMAL: 1.31 UIU/ML (ref 0.27–4.2)
WBC # BLD AUTO: 7.1 K/UL (ref 4.3–11.1)

## 2024-11-08 PROCEDURE — 83735 ASSAY OF MAGNESIUM: CPT

## 2024-11-08 PROCEDURE — 94640 AIRWAY INHALATION TREATMENT: CPT

## 2024-11-08 PROCEDURE — 96366 THER/PROPH/DIAG IV INF ADDON: CPT

## 2024-11-08 PROCEDURE — G0378 HOSPITAL OBSERVATION PER HR: HCPCS

## 2024-11-08 PROCEDURE — 93312 ECHO TRANSESOPHAGEAL: CPT | Performed by: INTERNAL MEDICINE

## 2024-11-08 PROCEDURE — 36415 COLL VENOUS BLD VENIPUNCTURE: CPT

## 2024-11-08 PROCEDURE — 84443 ASSAY THYROID STIM HORMONE: CPT

## 2024-11-08 PROCEDURE — 2580000003 HC RX 258: Performed by: INTERNAL MEDICINE

## 2024-11-08 PROCEDURE — 93010 ELECTROCARDIOGRAM REPORT: CPT | Performed by: INTERNAL MEDICINE

## 2024-11-08 PROCEDURE — 6370000000 HC RX 637 (ALT 250 FOR IP): Performed by: NURSE PRACTITIONER

## 2024-11-08 PROCEDURE — 93319 3D ECHO IMG CGEN CAR ANOMAL: CPT | Performed by: INTERNAL MEDICINE

## 2024-11-08 PROCEDURE — 93005 ELECTROCARDIOGRAM TRACING: CPT | Performed by: INTERNAL MEDICINE

## 2024-11-08 PROCEDURE — 6360000002 HC RX W HCPCS: Performed by: INTERNAL MEDICINE

## 2024-11-08 PROCEDURE — 92960 CARDIOVERSION ELECTRIC EXT: CPT | Performed by: INTERNAL MEDICINE

## 2024-11-08 PROCEDURE — 85027 COMPLETE CBC AUTOMATED: CPT

## 2024-11-08 PROCEDURE — 80048 BASIC METABOLIC PNL TOTAL CA: CPT

## 2024-11-08 PROCEDURE — 99232 SBSQ HOSP IP/OBS MODERATE 35: CPT | Performed by: INTERNAL MEDICINE

## 2024-11-08 PROCEDURE — 99152 MOD SED SAME PHYS/QHP 5/>YRS: CPT | Performed by: INTERNAL MEDICINE

## 2024-11-08 PROCEDURE — 93320 DOPPLER ECHO COMPLETE: CPT | Performed by: INTERNAL MEDICINE

## 2024-11-08 RX ORDER — HYDROCHLOROTHIAZIDE 25 MG/1
25 TABLET ORAL DAILY
Status: DISCONTINUED | OUTPATIENT
Start: 2024-11-08 | End: 2024-11-08

## 2024-11-08 RX ORDER — SPIRONOLACTONE 25 MG/1
25 TABLET ORAL EVERY EVENING
Status: DISCONTINUED | OUTPATIENT
Start: 2024-11-08 | End: 2024-11-08

## 2024-11-08 RX ORDER — SPIRONOLACTONE 25 MG/1
25 TABLET ORAL EVERY EVENING
Status: DISCONTINUED | OUTPATIENT
Start: 2024-11-08 | End: 2024-11-08 | Stop reason: HOSPADM

## 2024-11-08 RX ORDER — HYDROCHLOROTHIAZIDE 25 MG/1
25 TABLET ORAL EVERY EVENING
Status: DISCONTINUED | OUTPATIENT
Start: 2024-11-08 | End: 2024-11-08 | Stop reason: HOSPADM

## 2024-11-08 RX ORDER — AMIODARONE HYDROCHLORIDE 200 MG/1
400 TABLET ORAL 2 TIMES DAILY
Status: DISCONTINUED | OUTPATIENT
Start: 2024-11-08 | End: 2024-11-08 | Stop reason: HOSPADM

## 2024-11-08 RX ORDER — AMIODARONE HYDROCHLORIDE 200 MG/1
TABLET ORAL
Qty: 132 TABLET | Refills: 0 | Status: SHIPPED | OUTPATIENT
Start: 2024-11-08 | End: 2025-02-20

## 2024-11-08 RX ADMIN — AMIODARONE HYDROCHLORIDE 400 MG: 200 TABLET ORAL at 11:26

## 2024-11-08 RX ADMIN — LEVOTHYROXINE SODIUM 88 MCG: 0.09 TABLET ORAL at 05:57

## 2024-11-08 RX ADMIN — METOPROLOL SUCCINATE 100 MG: 100 TABLET, EXTENDED RELEASE ORAL at 08:21

## 2024-11-08 RX ADMIN — ARFORMOTEROL TARTRATE 15 MCG: 15 SOLUTION RESPIRATORY (INHALATION) at 07:30

## 2024-11-08 RX ADMIN — IPRATROPIUM BROMIDE 0.5 MG: 0.5 SOLUTION RESPIRATORY (INHALATION) at 07:31

## 2024-11-08 RX ADMIN — IPRATROPIUM BROMIDE 0.5 MG: 0.5 SOLUTION RESPIRATORY (INHALATION) at 11:13

## 2024-11-08 RX ADMIN — AMIODARONE HYDROCHLORIDE 0.5 MG/MIN: 50 INJECTION, SOLUTION INTRAVENOUS at 05:57

## 2024-11-08 RX ADMIN — BUDESONIDE INHALATION 500 MCG: 0.5 SUSPENSION RESPIRATORY (INHALATION) at 07:31

## 2024-11-08 RX ADMIN — OXYCODONE 15 MG: 5 TABLET ORAL at 08:21

## 2024-11-08 RX ADMIN — APIXABAN 5 MG: 5 TABLET, FILM COATED ORAL at 08:21

## 2024-11-08 RX ADMIN — MAGNESIUM GLUCONATE 500 MG ORAL TABLET 400 MG: 500 TABLET ORAL at 08:21

## 2024-11-08 RX ADMIN — PANTOPRAZOLE SODIUM 40 MG: 40 TABLET, DELAYED RELEASE ORAL at 05:57

## 2024-11-08 ASSESSMENT — PAIN SCALES - GENERAL
PAINLEVEL_OUTOF10: 0
PAINLEVEL_OUTOF10: 6
PAINLEVEL_OUTOF10: 0

## 2024-11-08 NOTE — PLAN OF CARE
Problem: Respiratory - Adult  Goal: Achieves optimal ventilation and oxygenation  Outcome: Progressing  Flowsheets (Taken 11/8/2024 8059)  Achieves optimal ventilation and oxygenation:   Assess for changes in respiratory status   Respiratory therapy support as indicated   Assess for changes in mentation and behavior   Position to facilitate oxygenation and minimize respiratory effort   Encourage broncho-pulmonary hygiene including cough, deep breathe, incentive spirometry   Assess and instruct to report shortness of breath or any respiratory difficulty

## 2024-11-08 NOTE — PROGRESS NOTES
[facility] CARDIOLOGY PROGRESS NOTE           11/8/2024 9:51 AM    Admit Date: 11/7/2024      Subjective:   Mrs. Quintero converted to sinus rhythm overnight. She is resting comfortable this morning. Denies chest pain, palpitations, dyspnea. She has no complaints and is feeling well.    ROS:  Cardiovascular:  As noted above    Objective:      Vitals:    11/08/24 0500 11/08/24 0600 11/08/24 0722 11/08/24 0731   BP: (!) 109/59 126/70 (!) 107/52    Pulse: 60 63 59 62   Resp:   17 18   Temp:   98.6 °F (37 °C)    TempSrc:   Oral    SpO2:   97%    Weight:       Height:             Intake/Output Summary (Last 24 hours) at 11/8/2024 0951  Last data filed at 11/8/2024 0601  Gross per 24 hour   Intake 600.65 ml   Output 450 ml   Net 150.65 ml       Physical Exam:  General-No Acute Distress  Neck- supple, no JVD  CV- regular rate and rhythm no MRG  Lung- clear bilaterally  Abd- soft, nontender, nondistended  Ext- no edema bilaterally.  Skin- warm and dry    Data Review:   Recent Labs     11/07/24  1200 11/08/24  0448    137   K 3.9 3.9   MG 1.8 2.3   BUN 10 16   WBC 10.6 7.1   HGB 12.8 10.8*   HCT 40.0 33.4*    235       Assessment/Plan:     Principal Problem:    Atrial flutter (HCC)    Paroxysmal atrial fibrillation (HCC)  Status post unsuccessful DCCV x3 yesterday, converted spontaneously overnight. We discussed the importance of Eliquis compliance for stroke prevention. She is feeling well and will be ready for discharge on oral amiodarone later today if she is able to ambulate comfortably and without arrhythmia.    Plan:   - When IV amiodarone infusion is completed, will start an oral load with taper as follows: take amiodarone 400 mg twice daily for two weeks, then 400 mg once daily for two weeks, then 200 mg once daily thereafter  - Hold flecainide  - Continue nebivolol 10 mg daily  - Continue Eliquis 5 mg twice daily  - Outpatient EP follow-up as already scheduled for next week    Active  Problems:  Plan:     Coronary artery disease involving native coronary artery of native heart without angina pectoris  Plan: asymptomatic, continue Eliquis, Lipitor at home doses        Mixed hyperlipidemia: Continue statin    Essential hypertension: closely monitor BP while inpatient.      Gómez Childs MD  11/8/2024 9:51 AM

## 2024-11-08 NOTE — PLAN OF CARE
Problem: Discharge Planning  Goal: Discharge to home or other facility with appropriate resources  Outcome: Adequate for Discharge     Problem: Pain  Goal: Verbalizes/displays adequate comfort level or baseline comfort level  Outcome: Adequate for Discharge     Problem: Safety - Adult  Goal: Free from fall injury  Outcome: Adequate for Discharge     Problem: Respiratory - Adult  Goal: Achieves optimal ventilation and oxygenation  11/8/2024 1530 by Allison Miguel RN  Outcome: Adequate for Discharge  11/8/2024 0733 by Raz Em RCP  Outcome: Progressing  Flowsheets (Taken 11/8/2024 0733)  Achieves optimal ventilation and oxygenation:   Assess for changes in respiratory status   Respiratory therapy support as indicated   Assess for changes in mentation and behavior   Position to facilitate oxygenation and minimize respiratory effort   Encourage broncho-pulmonary hygiene including cough, deep breathe, incentive spirometry   Assess and instruct to report shortness of breath or any respiratory difficulty

## 2024-11-08 NOTE — DISCHARGE SUMMARY
[facility] Cardiology Discharge Summary     Patient ID:  Lachelle Quintero  [id number]  69 y.o.  1955    Admit date: 11/7/2024    Discharge date:  11/8/2024    Admitting Physician: Steve Buchanan DO     Discharge Physician: Dr. Childs    Admission Diagnoses: A-fib (HCC) [I48.91]  Atrial flutter (HCC) [I48.92]    Discharge Diagnoses:   Patient Active Problem List   Diagnosis    Chronic pain disorder    Coronary artery disease involving native coronary artery of native heart without angina pectoris    Alicia's esophagus    Cervical radiculopathy    Chronic low back pain    AMD (age-related macular degeneration), bilateral    Primary hypothyroidism    HTN (hypertension), benign    Gastroesophageal reflux disease without esophagitis    Iron deficiency anemia    Mixed hyperlipidemia    MUSHTAQ (obstructive sleep apnea)    Prediabetes    Primary insomnia    Sciatica    Slow transit constipation    Stage 3a chronic kidney disease     Vitamin D deficiency    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 Joseph for Afib RVR and planned SUSHILA/DCCV.     SUSHILA/Cardioversion was planned. The patient was taken to the CCL on 11/7/24 by Dr. Buchanan and underwent SUSHILA that was negative for thrombus followed by unsuccessful DCCV x3 (100j, 200j, 360j).      The patient was admitted to the telemetry floor and initiated on IV Amio gtt. The patient converted back to sinus rhythm. The patient felt much better back in sinus rhythm.      On the morning of 11/8/24, the patient was feeling much better and remained in sinus rhythm. She was initiated on PO Amio. The patient was seen and examined by Dr. Childs and was determined stable and ready for discharge home. The

## 2024-11-08 NOTE — CARE COORDINATION
Patient hospitalized in OP status for Aflutter. Amio drip. Starting oral Amio dose.   11/08/24 1343   Service Assessment   Patient Orientation Alert and Oriented   Cognition Alert   History Provided By Patient   Primary Caregiver Self   Accompanied By/Relationship Family   Support Systems Children   Patient's Healthcare Decision Maker is: Legal Next of Kin   PCP Verified by CM Yes   Last Visit to PCP Within last 6 months   Prior Functional Level Independent in ADLs/IADLs   Current Functional Level Independent in ADLs/IADLs   Can patient return to prior living arrangement Yes   Ability to make needs known: Good   Family able to assist with home care needs: Yes   Would you like for me to discuss the discharge plan with any other family members/significant others, and if so, who? No   Financial Resources Medicare;Medicaid   Community Resources None   CM/SW Referral Other (see comment)  (N/A)   Social/Functional History   Lives With Family   Type of Home House   Home Equipment None   Receives Help From Family   ADL Assistance Independent   Homemaking Assistance Independent   Ambulation Assistance Independent   Transfer Assistance Independent   Active  Yes   Mode of Transportation Car   Occupation Retired   Discharge Planning   Type of Residence House   Living Arrangements Children   Current Services Prior To Admission None   Potential Assistance Needed N/A   DME Ordered? No   Potential Assistance Purchasing Medications No   Type of Home Care Services None   Patient expects to be discharged to: House   Services At/After Discharge   Transition of Care Consult (CM Consult) N/A   Services At/After Discharge None    Resource Information Provided? No   Mode of Transport at Discharge Other (see comment)  (Car driven by family member)   Confirm Follow Up Transport Family   Condition of Participation: Discharge Planning   The Plan for Transition of Care is related to the following treatment goals: Home with family

## 2024-11-08 NOTE — PROGRESS NOTES
Discharge instructions reviewed with pt. Prescriptions given for new meds and med info sheets provided for all new medications. Opportunity for questions provided. pt voiced understanding of all discharge instructions.   Iv and monitor removed

## 2024-11-08 NOTE — PLAN OF CARE
Problem: Discharge Planning  Goal: Discharge to home or other facility with appropriate resources  Outcome: Progressing  Flowsheets  Taken 11/7/2024 2016 by Aliza Osman RN  Discharge to home or other facility with appropriate resources:   Identify barriers to discharge with patient and caregiver   Identify discharge learning needs (meds, wound care, etc)   Arrange for needed discharge resources and transportation as appropriate  Taken 11/7/2024 1528 by Charis Hong RN  Discharge to home or other facility with appropriate resources: Identify barriers to discharge with patient and caregiver     Problem: Pain  Goal: Verbalizes/displays adequate comfort level or baseline comfort level  Outcome: Progressing  Flowsheets (Taken 11/7/2024 2016)  Verbalizes/displays adequate comfort level or baseline comfort level:   Encourage patient to monitor pain and request assistance   Assess pain using appropriate pain scale   Administer analgesics based on type and severity of pain and evaluate response

## 2024-11-11 ENCOUNTER — TELEPHONE (OUTPATIENT)
Age: 69
End: 2024-11-11

## 2024-11-11 ENCOUNTER — CARE COORDINATION (OUTPATIENT)
Dept: CARE COORDINATION | Facility: CLINIC | Age: 69
End: 2024-11-11

## 2024-11-11 DIAGNOSIS — I48.4 ATYPICAL ATRIAL FLUTTER (HCC): Primary | ICD-10-CM

## 2024-11-11 PROCEDURE — 1111F DSCHRG MED/CURRENT MED MERGE: CPT | Performed by: INTERNAL MEDICINE

## 2024-11-11 NOTE — TELEPHONE ENCOUNTER
Pt was in the hospital last night, Dr in the ER told her the SUSHILA did not work. She was told that she should push out her Nuke. Pt would like to speak with Dr.Merriams horner.

## 2024-11-11 NOTE — CARE COORDINATION
Patient Current Location:  Home: [address] [facility] [place]  [place] [address]    Care Transition Nurse contacted the patient by telephone to perform post hospital discharge assessment, verified name and  as identifiers. Provided introduction to self, and explanation of the Care Transition Nurse role.     Patient: Lachelle Quintero    Patient : 1955   MRN: [id number]    Reason for Admission: A-fib  Discharge Date: 24  RURS: No data recorded    Last Discharge Facility       Date Complaint Diagnosis Description Type Department Provider    24  A-fib (HCC) Admission (Discharged) from [id number] Steve Buchanan, DO            Was this an external facility discharge? No    Additional needs identified to be addressed with provider   No needs identified             Method of communication with provider: none.    Patients top risk factors for readmission: medical condition-see below for history and problems.    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    Care Summary Note: Patient states they are doing ok. Good understanding of

## 2024-11-20 ENCOUNTER — CARE COORDINATION (OUTPATIENT)
Dept: CARE COORDINATION | Facility: CLINIC | Age: 69
End: 2024-11-20

## 2024-11-20 NOTE — CARE COORDINATION
Attempted to reach patient for transitions of care follow up.  Unable to reach patient.      Outreach Attempts:   HIPAA compliant voicemail left for patient.     Care Summary Note: UTR x 1.  /7/2024 - 11/8/2024 (28 hours)  [facility]        Steve Buchanan DO  Last attending  Treatment team Atrial flutter (HCC)  Principal problem     Went to cardiologist on 11/07/2024    Follow Up Appointment:   Future Appointments         Provider Specialty Dept Phone    12/10/2024 7:45 AM [facility] Cardiology [phone]    12/13/2024 8:30 AM Derrek Gonzalez MD Cardiology [phone]    12/19/2024 8:45 AM Samir Mcwilliams MD Family Medicine [phone]    12/19/2024 11:45 AM Stephen Joseph MD Cardiology [phone]    3/6/2025 8:00 AM Stephen Joseph MD Cardiology [phone]            Plan for follow-up call in 11-14 days based on severity of symptoms and risk factors. Plan for next call:  concerns/questions.    Sarah Gaines, RN

## 2024-11-21 ENCOUNTER — TELEPHONE (OUTPATIENT)
Dept: FAMILY MEDICINE CLINIC | Facility: CLINIC | Age: 69
End: 2024-11-21

## 2024-11-21 DIAGNOSIS — F51.01 PRIMARY INSOMNIA: Primary | ICD-10-CM

## 2024-11-21 RX ORDER — ZOLPIDEM TARTRATE 10 MG/1
10 TABLET ORAL NIGHTLY PRN
Qty: 30 TABLET | Refills: 1 | Status: SHIPPED | OUTPATIENT
Start: 2024-11-21 | End: 2025-01-20

## 2024-11-21 NOTE — TELEPHONE ENCOUNTER
Pt needs the following refill:    Zolpidem (Ambien) 10 mg tablet      Please use the [facility] on file.  Thank you

## 2024-12-05 ENCOUNTER — CARE COORDINATION (OUTPATIENT)
Dept: CARE COORDINATION | Facility: CLINIC | Age: 69
End: 2024-12-05

## 2024-12-05 NOTE — CARE COORDINATION
Attempted to reach patient for transitions of care follow up.  Unable to reach patient.      Outreach Attempts:   Unable to leave message.     Care Summary Note: UTR x 2. Plan to call again on 12/11/2024    Follow Up Appointment:   Future Appointments         Provider Specialty Dept Phone    12/10/2024 7:45 AM [facility] Cardiology [phone]    12/13/2024 8:30 AM Derrek Gonzalez MD Cardiology [phone]    12/19/2024 8:45 AM Samir Mcwilliams MD Family Medicine [phone]    12/19/2024 11:45 AM Stephen Joseph MD Cardiology [phone]    3/6/2025 8:00 AM Stephen Joseph MD Cardiology [phone]            Plan for follow-up call in 2-5 days based on severity of symptoms and risk factors. Plan for next call:  attempt to call and close.    Sarah Gaines RN

## 2024-12-11 ENCOUNTER — CARE COORDINATION (OUTPATIENT)
Dept: CARE COORDINATION | Facility: CLINIC | Age: 69
End: 2024-12-11

## 2024-12-11 NOTE — CARE COORDINATION
Attempted to reach patient for transitions of care follow up.  Unable to reach patient.      Outreach Attempts:   Unable to leave message.     Patient closed (unable to reach patient) from the Care Transitions program on 12/11/2024.  Patient/family  unable to reach x 2. .      Handoff:   Patient was not referred to the ACM team due to unable to contact patient.      Care Summary Note: Patient went to an appointment on 11/7/2024 with Cardiology; no other appointments have been attended; upcoming appointment with Cardiology and PCP.    Assessments:  Care Transitions Subsequent and Final Call    Subsequent and Final Calls  Care Transitions Interventions  Other Interventions:              Upcoming Appointments:    Future Appointments         Provider Specialty Dept Phone    12/13/2024 8:30 AM Derrek Gonzalez MD Cardiology [phone]    12/19/2024 8:45 AM Samir Mcwilliams MD Family Medicine [phone]    12/19/2024 11:45 AM Stephen Joseph MD Cardiology [phone]    3/6/2025 8:00 AM Stephen Joseph MD Cardiology [phone]            Sarah Gaines RN

## 2024-12-13 ENCOUNTER — OFFICE VISIT (OUTPATIENT)
Age: 69
End: 2024-12-13
Payer: MEDICARE

## 2024-12-13 VITALS
SYSTOLIC BLOOD PRESSURE: 122 MMHG | WEIGHT: 150 LBS | HEART RATE: 60 BPM | BODY MASS INDEX: 24.99 KG/M2 | DIASTOLIC BLOOD PRESSURE: 62 MMHG | HEIGHT: 65 IN

## 2024-12-13 DIAGNOSIS — I25.10 CORONARY ARTERY DISEASE INVOLVING NATIVE CORONARY ARTERY OF NATIVE HEART WITHOUT ANGINA PECTORIS: ICD-10-CM

## 2024-12-13 DIAGNOSIS — I48.3 TYPICAL ATRIAL FLUTTER (HCC): Primary | ICD-10-CM

## 2024-12-13 DIAGNOSIS — I48.0 PAROXYSMAL ATRIAL FIBRILLATION (HCC): ICD-10-CM

## 2024-12-13 DIAGNOSIS — J44.9 CHRONIC OBSTRUCTIVE PULMONARY DISEASE, UNSPECIFIED COPD TYPE (HCC): ICD-10-CM

## 2024-12-13 DIAGNOSIS — I10 HTN (HYPERTENSION), BENIGN: ICD-10-CM

## 2024-12-13 PROCEDURE — G8427 DOCREV CUR MEDS BY ELIG CLIN: HCPCS | Performed by: INTERNAL MEDICINE

## 2024-12-13 PROCEDURE — 93000 ELECTROCARDIOGRAM COMPLETE: CPT | Performed by: INTERNAL MEDICINE

## 2024-12-13 PROCEDURE — G8399 PT W/DXA RESULTS DOCUMENT: HCPCS | Performed by: INTERNAL MEDICINE

## 2024-12-13 PROCEDURE — 1090F PRES/ABSN URINE INCON ASSESS: CPT | Performed by: INTERNAL MEDICINE

## 2024-12-13 PROCEDURE — 99214 OFFICE O/P EST MOD 30 MIN: CPT | Performed by: INTERNAL MEDICINE

## 2024-12-13 PROCEDURE — 1036F TOBACCO NON-USER: CPT | Performed by: INTERNAL MEDICINE

## 2024-12-13 PROCEDURE — 3074F SYST BP LT 130 MM HG: CPT | Performed by: INTERNAL MEDICINE

## 2024-12-13 PROCEDURE — 1123F ACP DISCUSS/DSCN MKR DOCD: CPT | Performed by: INTERNAL MEDICINE

## 2024-12-13 PROCEDURE — G8484 FLU IMMUNIZE NO ADMIN: HCPCS | Performed by: INTERNAL MEDICINE

## 2024-12-13 PROCEDURE — 3023F SPIROM DOC REV: CPT | Performed by: INTERNAL MEDICINE

## 2024-12-13 PROCEDURE — 1159F MED LIST DOCD IN RCRD: CPT | Performed by: INTERNAL MEDICINE

## 2024-12-13 PROCEDURE — 1125F AMNT PAIN NOTED PAIN PRSNT: CPT | Performed by: INTERNAL MEDICINE

## 2024-12-13 PROCEDURE — G8420 CALC BMI NORM PARAMETERS: HCPCS | Performed by: INTERNAL MEDICINE

## 2024-12-13 PROCEDURE — 3078F DIAST BP <80 MM HG: CPT | Performed by: INTERNAL MEDICINE

## 2024-12-13 PROCEDURE — 3017F COLORECTAL CA SCREEN DOC REV: CPT | Performed by: INTERNAL MEDICINE

## 2024-12-13 RX ORDER — SPIRONOLACTONE AND HYDROCHLOROTHIAZIDE 25; 25 MG/1; MG/1
1 TABLET ORAL DAILY
Qty: 90 TABLET | Refills: 3 | Status: SHIPPED | OUTPATIENT
Start: 2024-12-13

## 2024-12-13 RX ORDER — NEBIVOLOL 10 MG/1
10 TABLET ORAL DAILY
Qty: 90 TABLET | Refills: 3 | Status: SHIPPED | OUTPATIENT
Start: 2024-12-13

## 2024-12-13 RX ORDER — AMIODARONE HYDROCHLORIDE 200 MG/1
200 TABLET ORAL DAILY
Qty: 90 TABLET | Refills: 3 | Status: SHIPPED | OUTPATIENT
Start: 2024-12-13

## 2024-12-13 ASSESSMENT — ENCOUNTER SYMPTOMS
SHORTNESS OF BREATH: 0
GASTROINTESTINAL NEGATIVE: 1
EYES NEGATIVE: 1
ALLERGIC/IMMUNOLOGIC NEGATIVE: 1

## 2024-12-13 NOTE — PROGRESS NOTES
pk-yrs)     Types: Cigarettes     Start date: 5/5/1974     Quit date: 5/5/2014     Years since quitting: 10.6    Smokeless tobacco: Former   Substance Use Topics    Alcohol use: No     Alcohol/week: 0.0 standard drinks of alcohol       ROS:  A comprehensive review of systems was performed with the pertinent positives and negatives as noted in the HPI in addition to:  Review of Systems   Constitutional: Negative.    HENT: Negative.     Eyes: Negative.    Respiratory:  Negative for shortness of breath.    Cardiovascular:  Positive for palpitations.   Gastrointestinal: Negative.    Endocrine: Negative.    Genitourinary: Negative.    Musculoskeletal: Negative.    Skin: Negative.    Allergic/Immunologic: Negative.    Neurological: Negative.    Hematological: Negative.    Psychiatric/Behavioral: Negative.     All other systems reviewed and are negative.    PHYSICAL EXAM:   /62   Pulse 60   Ht 1.651 m (5' 5\")   Wt 68 kg (150 lb)   BMI 24.96 kg/m²      Wt Readings from Last 3 Encounters:   12/13/24 68 kg (150 lb)   11/07/24 68.5 kg (151 lb)   11/07/24 68.5 kg (151 lb)     BP Readings from Last 3 Encounters:   12/13/24 122/62   11/08/24 (!) 133/56   11/07/24 118/76     Gen: Well appearing, well developed, no acute distress  Eyes: Pupils equal, round. Extraocular movements are intact  ENT: Oropharynx clear, no oral lesions, normal dentition  CV: S1S2, RRR, no murmurs, rubs or gallops, normal JVD, no carotid bruits, normal distal pulses, no MEENU  Pulm: Clear to auscultation bilaterally, no accessory muscle uses, no wheezes or rales  GI: Soft, NT, ND, +BS  Neuro: Alert and oriented, nonfocal  Psych: Appropriate affect  Skin: Normal color and skin turgor  MSK: Normal muscle bulk and tone    Medical problems and test results were reviewed with the patient today.     No results found for any visits on 12/13/24.

## 2024-12-17 ENCOUNTER — CARE COORDINATION (OUTPATIENT)
Dept: CARE COORDINATION | Facility: CLINIC | Age: 69
End: 2024-12-17

## 2024-12-19 ENCOUNTER — OFFICE VISIT (OUTPATIENT)
Dept: FAMILY MEDICINE CLINIC | Facility: CLINIC | Age: 69
End: 2024-12-19
Payer: MEDICARE

## 2024-12-19 ENCOUNTER — OFFICE VISIT (OUTPATIENT)
Age: 69
End: 2024-12-19
Payer: MEDICARE

## 2024-12-19 VITALS
DIASTOLIC BLOOD PRESSURE: 58 MMHG | HEART RATE: 64 BPM | HEIGHT: 65 IN | BODY MASS INDEX: 25.16 KG/M2 | SYSTOLIC BLOOD PRESSURE: 120 MMHG | WEIGHT: 151 LBS

## 2024-12-19 VITALS
WEIGHT: 151 LBS | SYSTOLIC BLOOD PRESSURE: 120 MMHG | TEMPERATURE: 98.3 F | BODY MASS INDEX: 25.16 KG/M2 | DIASTOLIC BLOOD PRESSURE: 60 MMHG | HEIGHT: 65 IN

## 2024-12-19 DIAGNOSIS — K22.70 BARRETT'S ESOPHAGUS WITHOUT DYSPLASIA: ICD-10-CM

## 2024-12-19 DIAGNOSIS — E03.8 OTHER SPECIFIED HYPOTHYROIDISM: ICD-10-CM

## 2024-12-19 DIAGNOSIS — K59.09 OTHER CONSTIPATION: ICD-10-CM

## 2024-12-19 DIAGNOSIS — F51.01 PRIMARY INSOMNIA: ICD-10-CM

## 2024-12-19 DIAGNOSIS — R11.0 NAUSEA: ICD-10-CM

## 2024-12-19 DIAGNOSIS — I48.4 ATYPICAL ATRIAL FLUTTER (HCC): ICD-10-CM

## 2024-12-19 DIAGNOSIS — Z09 HOSPITAL DISCHARGE FOLLOW-UP: ICD-10-CM

## 2024-12-19 DIAGNOSIS — R11.2 NAUSEA AND VOMITING, UNSPECIFIED VOMITING TYPE: ICD-10-CM

## 2024-12-19 DIAGNOSIS — R10.9 ABDOMINAL CRAMPING: ICD-10-CM

## 2024-12-19 DIAGNOSIS — I10 HYPERTENSION, ESSENTIAL: ICD-10-CM

## 2024-12-19 DIAGNOSIS — F41.9 ANXIETY: Primary | ICD-10-CM

## 2024-12-19 DIAGNOSIS — I48.0 PAROXYSMAL ATRIAL FIBRILLATION (HCC): ICD-10-CM

## 2024-12-19 DIAGNOSIS — Z79.899 LONG TERM CURRENT USE OF ANTIARRHYTHMIC DRUG: ICD-10-CM

## 2024-12-19 DIAGNOSIS — I10 HTN (HYPERTENSION), BENIGN: Primary | ICD-10-CM

## 2024-12-19 LAB
ALBUMIN SERPL-MCNC: 3.7 G/DL (ref 3.2–4.6)
ALBUMIN/GLOB SERPL: 1.4 (ref 1–1.9)
ALP SERPL-CCNC: 61 U/L (ref 35–104)
ALT SERPL-CCNC: 13 U/L (ref 8–45)
ANION GAP SERPL CALC-SCNC: 11 MMOL/L (ref 7–16)
AST SERPL-CCNC: 18 U/L (ref 15–37)
BASOPHILS # BLD: 0.1 K/UL (ref 0–0.2)
BASOPHILS NFR BLD: 1 % (ref 0–2)
BILIRUB SERPL-MCNC: 0.6 MG/DL (ref 0–1.2)
BUN SERPL-MCNC: 21 MG/DL (ref 8–23)
CALCIUM SERPL-MCNC: 9.7 MG/DL (ref 8.8–10.2)
CHLORIDE SERPL-SCNC: 100 MMOL/L (ref 98–107)
CO2 SERPL-SCNC: 28 MMOL/L (ref 20–29)
CREAT SERPL-MCNC: 1.46 MG/DL (ref 0.6–1.1)
DIFFERENTIAL METHOD BLD: ABNORMAL
EOSINOPHIL # BLD: 0 K/UL (ref 0–0.8)
EOSINOPHIL NFR BLD: 0 % (ref 0.5–7.8)
ERYTHROCYTE [DISTWIDTH] IN BLOOD BY AUTOMATED COUNT: 15.3 % (ref 11.9–14.6)
GLOBULIN SER CALC-MCNC: 2.7 G/DL (ref 2.3–3.5)
GLUCOSE SERPL-MCNC: 95 MG/DL (ref 70–99)
HCT VFR BLD AUTO: 23.6 % (ref 35.8–46.3)
HGB BLD-MCNC: 7.2 G/DL (ref 11.7–15.4)
IMM GRANULOCYTES # BLD AUTO: 0 K/UL (ref 0–0.5)
IMM GRANULOCYTES NFR BLD AUTO: 0 % (ref 0–5)
LIPASE SERPL-CCNC: 57 U/L (ref 13–60)
LYMPHOCYTES # BLD: 1 K/UL (ref 0.5–4.6)
LYMPHOCYTES NFR BLD: 15 % (ref 13–44)
MCH RBC QN AUTO: 27.5 PG (ref 26.1–32.9)
MCHC RBC AUTO-ENTMCNC: 30.5 G/DL (ref 31.4–35)
MCV RBC AUTO: 90.1 FL (ref 82–102)
MONOCYTES # BLD: 0.6 K/UL (ref 0.1–1.3)
MONOCYTES NFR BLD: 9 % (ref 4–12)
NEUTS SEG # BLD: 5.2 K/UL (ref 1.7–8.2)
NEUTS SEG NFR BLD: 75 % (ref 43–78)
NRBC # BLD: 0 K/UL (ref 0–0.2)
PLATELET # BLD AUTO: 360 K/UL (ref 150–450)
PMV BLD AUTO: 9.9 FL (ref 9.4–12.3)
POTASSIUM SERPL-SCNC: 4.5 MMOL/L (ref 3.5–5.1)
PROT SERPL-MCNC: 6.3 G/DL (ref 6.3–8.2)
RBC # BLD AUTO: 2.62 M/UL (ref 4.05–5.2)
SODIUM SERPL-SCNC: 139 MMOL/L (ref 136–145)
TSH, 3RD GENERATION: 2 UIU/ML (ref 0.27–4.2)
WBC # BLD AUTO: 7 K/UL (ref 4.3–11.1)

## 2024-12-19 PROCEDURE — G8417 CALC BMI ABV UP PARAM F/U: HCPCS | Performed by: FAMILY MEDICINE

## 2024-12-19 PROCEDURE — G8484 FLU IMMUNIZE NO ADMIN: HCPCS | Performed by: FAMILY MEDICINE

## 2024-12-19 PROCEDURE — 3074F SYST BP LT 130 MM HG: CPT | Performed by: FAMILY MEDICINE

## 2024-12-19 PROCEDURE — 3017F COLORECTAL CA SCREEN DOC REV: CPT | Performed by: INTERNAL MEDICINE

## 2024-12-19 PROCEDURE — 1036F TOBACCO NON-USER: CPT | Performed by: FAMILY MEDICINE

## 2024-12-19 PROCEDURE — 3078F DIAST BP <80 MM HG: CPT | Performed by: FAMILY MEDICINE

## 2024-12-19 PROCEDURE — 3074F SYST BP LT 130 MM HG: CPT | Performed by: INTERNAL MEDICINE

## 2024-12-19 PROCEDURE — G8427 DOCREV CUR MEDS BY ELIG CLIN: HCPCS | Performed by: FAMILY MEDICINE

## 2024-12-19 PROCEDURE — 1090F PRES/ABSN URINE INCON ASSESS: CPT | Performed by: FAMILY MEDICINE

## 2024-12-19 PROCEDURE — 1036F TOBACCO NON-USER: CPT | Performed by: INTERNAL MEDICINE

## 2024-12-19 PROCEDURE — 1159F MED LIST DOCD IN RCRD: CPT | Performed by: INTERNAL MEDICINE

## 2024-12-19 PROCEDURE — 1123F ACP DISCUSS/DSCN MKR DOCD: CPT | Performed by: FAMILY MEDICINE

## 2024-12-19 PROCEDURE — 3017F COLORECTAL CA SCREEN DOC REV: CPT | Performed by: FAMILY MEDICINE

## 2024-12-19 PROCEDURE — 1090F PRES/ABSN URINE INCON ASSESS: CPT | Performed by: INTERNAL MEDICINE

## 2024-12-19 PROCEDURE — 99214 OFFICE O/P EST MOD 30 MIN: CPT | Performed by: FAMILY MEDICINE

## 2024-12-19 PROCEDURE — 1111F DSCHRG MED/CURRENT MED MERGE: CPT | Performed by: FAMILY MEDICINE

## 2024-12-19 PROCEDURE — G8428 CUR MEDS NOT DOCUMENT: HCPCS | Performed by: INTERNAL MEDICINE

## 2024-12-19 PROCEDURE — 1126F AMNT PAIN NOTED NONE PRSNT: CPT | Performed by: INTERNAL MEDICINE

## 2024-12-19 PROCEDURE — 1159F MED LIST DOCD IN RCRD: CPT | Performed by: FAMILY MEDICINE

## 2024-12-19 PROCEDURE — 3078F DIAST BP <80 MM HG: CPT | Performed by: INTERNAL MEDICINE

## 2024-12-19 PROCEDURE — 99214 OFFICE O/P EST MOD 30 MIN: CPT | Performed by: INTERNAL MEDICINE

## 2024-12-19 PROCEDURE — G8484 FLU IMMUNIZE NO ADMIN: HCPCS | Performed by: INTERNAL MEDICINE

## 2024-12-19 PROCEDURE — G8399 PT W/DXA RESULTS DOCUMENT: HCPCS | Performed by: INTERNAL MEDICINE

## 2024-12-19 PROCEDURE — 1123F ACP DISCUSS/DSCN MKR DOCD: CPT | Performed by: INTERNAL MEDICINE

## 2024-12-19 PROCEDURE — G8399 PT W/DXA RESULTS DOCUMENT: HCPCS | Performed by: FAMILY MEDICINE

## 2024-12-19 PROCEDURE — G8417 CALC BMI ABV UP PARAM F/U: HCPCS | Performed by: INTERNAL MEDICINE

## 2024-12-19 RX ORDER — AMIODARONE HYDROCHLORIDE 100 MG/1
100 TABLET ORAL DAILY
Qty: 30 TABLET | Refills: 3 | Status: SHIPPED | OUTPATIENT
Start: 2024-12-19

## 2024-12-19 RX ORDER — SERTRALINE HYDROCHLORIDE 25 MG/1
25 TABLET, FILM COATED ORAL DAILY
Qty: 30 TABLET | Refills: 0 | Status: SHIPPED | OUTPATIENT
Start: 2024-12-19

## 2024-12-19 RX ORDER — ZOLPIDEM TARTRATE 10 MG/1
10 TABLET ORAL NIGHTLY PRN
Qty: 30 TABLET | Refills: 1 | Status: SHIPPED | OUTPATIENT
Start: 2024-12-19 | End: 2025-02-17

## 2024-12-19 ASSESSMENT — PATIENT HEALTH QUESTIONNAIRE - PHQ9
8. MOVING OR SPEAKING SO SLOWLY THAT OTHER PEOPLE COULD HAVE NOTICED. OR THE OPPOSITE, BEING SO FIGETY OR RESTLESS THAT YOU HAVE BEEN MOVING AROUND A LOT MORE THAN USUAL: NOT AT ALL
5. POOR APPETITE OR OVEREATING: NEARLY EVERY DAY
6. FEELING BAD ABOUT YOURSELF - OR THAT YOU ARE A FAILURE OR HAVE LET YOURSELF OR YOUR FAMILY DOWN: NOT AT ALL
SUM OF ALL RESPONSES TO PHQ9 QUESTIONS 1 & 2: 6
7. TROUBLE CONCENTRATING ON THINGS, SUCH AS READING THE NEWSPAPER OR WATCHING TELEVISION: NOT AT ALL
1. LITTLE INTEREST OR PLEASURE IN DOING THINGS: NEARLY EVERY DAY
2. FEELING DOWN, DEPRESSED OR HOPELESS: NEARLY EVERY DAY
3. TROUBLE FALLING OR STAYING ASLEEP: NEARLY EVERY DAY
SUM OF ALL RESPONSES TO PHQ QUESTIONS 1-9: 15
4. FEELING TIRED OR HAVING LITTLE ENERGY: NEARLY EVERY DAY
SUM OF ALL RESPONSES TO PHQ QUESTIONS 1-9: 15
9. THOUGHTS THAT YOU WOULD BE BETTER OFF DEAD, OR OF HURTING YOURSELF: NOT AT ALL
10. IF YOU CHECKED OFF ANY PROBLEMS, HOW DIFFICULT HAVE THESE PROBLEMS MADE IT FOR YOU TO DO YOUR WORK, TAKE CARE OF THINGS AT HOME, OR GET ALONG WITH OTHER PEOPLE: VERY DIFFICULT

## 2024-12-19 NOTE — PROGRESS NOTES
Post-Discharge Transitional Care Follow Up      Lachelle Quintero   YOB: 1955    Date of Office Visit:  12/19/2024  Date of Hospital Admission: 11/7/24  Date of Hospital Discharge: 11/8/24  Readmission Risk Score (high >=14%. Medium >=10%):No data recorded    Care management risk score Rising risk (score 2-5) and Complex Care (Scores >=6): No Risk Score On File     Non face to face  following discharge, date last encounter closed (first attempt may have been earlier): *No documented post hospital discharge outreach found in the last 14 days     Call initiated 2 business days of discharge: *No response recorded in the last 14 days     Anxiety  -     sertraline (ZOLOFT) 25 MG tablet; Take 1 tablet by mouth daily, Disp-30 tablet, R-0Normal  Primary insomnia  -     zolpidem (AMBIEN) 10 MG tablet; Take 1 tablet by mouth nightly as needed for Sleep for up to 60 days. Max Daily Amount: 10 mg, Disp-30 tablet, R-1Normal  Nausea  -     CBC with Auto Differential; Future  -     Comprehensive Metabolic Panel; Future  -     Lipase; Future  -     CT ABDOMEN PELVIS WO CONTRAST Additional Contrast? Radiologist Recommendation; Future  -     External Referral To Gastroenterology  Alicia's esophagus without dysplasia  -     CBC with Auto Differential; Future  -     External Referral To Gastroenterology  Abdominal cramping  -     CBC with Auto Differential; Future  -     Comprehensive Metabolic Panel; Future  -     Lipase; Future  -     CT ABDOMEN PELVIS WO CONTRAST Additional Contrast? Radiologist Recommendation; Future  -     External Referral To Gastroenterology  Other constipation  -     CBC with Auto Differential; Future  -     CT ABDOMEN PELVIS WO CONTRAST Additional Contrast? Radiologist Recommendation; Future  -     External Referral To Gastroenterology  Nausea and vomiting, unspecified vomiting type  -     CBC with Auto Differential; Future  -     Comprehensive Metabolic Panel; Future  -     Lipase; Future  -

## 2024-12-21 ASSESSMENT — ENCOUNTER SYMPTOMS
ABDOMINAL DISTENTION: 0
VOMITING: 1
COUGH: 0
PHOTOPHOBIA: 0
BLOOD IN STOOL: 0
NAUSEA: 1
ABDOMINAL PAIN: 0
EYE PAIN: 0
SORE THROAT: 0
COLOR CHANGE: 0
SHORTNESS OF BREATH: 0

## 2024-12-23 ENCOUNTER — TELEPHONE (OUTPATIENT)
Age: 69
End: 2024-12-23

## 2024-12-23 ENCOUNTER — CLINICAL DOCUMENTATION (OUTPATIENT)
Dept: FAMILY MEDICINE CLINIC | Facility: CLINIC | Age: 69
End: 2024-12-23

## 2024-12-23 DIAGNOSIS — I10 HTN (HYPERTENSION), BENIGN: Primary | ICD-10-CM

## 2024-12-23 RX ORDER — AMIODARONE HYDROCHLORIDE 200 MG/1
200 TABLET ORAL DAILY
Qty: 30 TABLET | Refills: 1 | Status: SHIPPED | OUTPATIENT
Start: 2024-12-23

## 2024-12-23 NOTE — TELEPHONE ENCOUNTER
Pt states that Dr. Joseph change the dose for her medication     MEDICATION REFILL REQUEST          Name of Medication:  Amiodarone  Dose:  100mg  Frequency:  1xday            Pharmacy Name/Location:  [facility] [place] [id number]

## 2024-12-23 NOTE — PROGRESS NOTES
I attempted to speak with patient yesterday but to no avail. She presented with severe nausea and has had a total of 3 ablations. Her GI work up has been delayed due to clearance issues regarding her heart. HER HGB IS 7.2-----------this has decreased from 10.8 a little over one month ago and she states that she continues with the nausea. She is also very dizzy this am. I ADVISED HER TO GO TO THE ER as I feel that she needs immediate evaluation and treatment. Dr. Alvares is with [facility] so I have advised her to go to [facility] ER as I feel that she can be better taken care of there.    I WILL ATTEMPT TO SEND A NOTE TO DR ALVARES THROUGH [employer].

## 2024-12-23 NOTE — TELEPHONE ENCOUNTER
Patient states the pharmacy did no receive the new prescription for Amiodarone on 12.19.24  Pharmacy : [facility]

## 2024-12-23 NOTE — TELEPHONE ENCOUNTER
Per Dr. Joseph's office visit note 12/19/2024 \"She has not yet commenced the 100 mg daily dose. A follow-up appointment with Dr. Rosa is scheduled in 6 months. She expresses concern regarding potential rhythm disturbances post-stress test, given her history of unsuccessful cardioversion. She is currently on amiodarone 200 mg once daily.\"  Requested Prescriptions     Pending Prescriptions Disp Refills    amiodarone (CORDARONE) 200 MG tablet 30 tablet 1     Sig: Take 1 tablet by mouth daily

## 2024-12-26 ENCOUNTER — HOSPITAL ENCOUNTER (EMERGENCY)
Age: 69
Discharge: HOME | End: 2024-12-26
Attending: EMERGENCY MEDICINE
Payer: MEDICARE

## 2024-12-26 VITALS
DIASTOLIC BLOOD PRESSURE: 55 MMHG | SYSTOLIC BLOOD PRESSURE: 120 MMHG | TEMPERATURE: 98.6 F | BODY MASS INDEX: 25.16 KG/M2 | OXYGEN SATURATION: 100 % | HEART RATE: 67 BPM | WEIGHT: 151 LBS | HEIGHT: 65 IN | RESPIRATION RATE: 16 BRPM

## 2024-12-26 DIAGNOSIS — D64.9 ANEMIA, UNSPECIFIED TYPE: Primary | ICD-10-CM

## 2024-12-26 LAB
ALBUMIN SERPL-MCNC: 3.5 G/DL (ref 3.2–4.6)
ALBUMIN/GLOB SERPL: 1.2 (ref 1–1.9)
ALP SERPL-CCNC: 61 U/L (ref 35–104)
ALT SERPL-CCNC: 13 U/L (ref 8–45)
ANION GAP SERPL CALC-SCNC: 10 MMOL/L (ref 7–16)
AST SERPL-CCNC: 21 U/L (ref 15–37)
BASOPHILS # BLD: 0 K/UL (ref 0–0.2)
BASOPHILS NFR BLD: 1 % (ref 0–2)
BILIRUB SERPL-MCNC: 0.6 MG/DL (ref 0–1.2)
BUN SERPL-MCNC: 15 MG/DL (ref 8–23)
CALCIUM SERPL-MCNC: 9.2 MG/DL (ref 8.8–10.2)
CHLORIDE SERPL-SCNC: 103 MMOL/L (ref 98–107)
CO2 SERPL-SCNC: 25 MMOL/L (ref 20–29)
CREAT SERPL-MCNC: 1.4 MG/DL (ref 0.6–1.1)
DIFFERENTIAL METHOD BLD: ABNORMAL
EOSINOPHIL # BLD: 0 K/UL (ref 0–0.8)
EOSINOPHIL NFR BLD: 1 % (ref 0.5–7.8)
ERYTHROCYTE [DISTWIDTH] IN BLOOD BY AUTOMATED COUNT: 15.8 % (ref 11.9–14.6)
GLOBULIN SER CALC-MCNC: 2.9 G/DL (ref 2.3–3.5)
GLUCOSE SERPL-MCNC: 141 MG/DL (ref 70–99)
HCT VFR BLD AUTO: 22.5 % (ref 35.8–46.3)
HCT VFR BLD AUTO: 23.3 % (ref 35.8–46.3)
HGB BLD-MCNC: 7 G/DL (ref 11.7–15.4)
HGB BLD-MCNC: 7.3 G/DL (ref 11.7–15.4)
HISTORY CHECK: NORMAL
IMM GRANULOCYTES # BLD AUTO: 0 K/UL (ref 0–0.5)
IMM GRANULOCYTES NFR BLD AUTO: 0 % (ref 0–5)
LYMPHOCYTES # BLD: 1.3 K/UL (ref 0.5–4.6)
LYMPHOCYTES NFR BLD: 17 % (ref 13–44)
MAGNESIUM SERPL-MCNC: 1.7 MG/DL (ref 1.8–2.4)
MCH RBC QN AUTO: 26.6 PG (ref 26.1–32.9)
MCHC RBC AUTO-ENTMCNC: 31.1 G/DL (ref 31.4–35)
MCV RBC AUTO: 85.6 FL (ref 82–102)
MONOCYTES # BLD: 0.5 K/UL (ref 0.1–1.3)
MONOCYTES NFR BLD: 7 % (ref 4–12)
NEUTS SEG # BLD: 5.6 K/UL (ref 1.7–8.2)
NEUTS SEG NFR BLD: 74 % (ref 43–78)
NRBC # BLD: 0 K/UL (ref 0–0.2)
PLATELET # BLD AUTO: 369 K/UL (ref 150–450)
PMV BLD AUTO: 9.6 FL (ref 9.4–12.3)
POTASSIUM SERPL-SCNC: 3.7 MMOL/L (ref 3.5–5.1)
PROT SERPL-MCNC: 6.4 G/DL (ref 6.3–8.2)
RBC # BLD AUTO: 2.63 M/UL (ref 4.05–5.2)
SODIUM SERPL-SCNC: 138 MMOL/L (ref 136–145)
WBC # BLD AUTO: 7.4 K/UL (ref 4.3–11.1)

## 2024-12-26 PROCEDURE — 85018 HEMOGLOBIN: CPT

## 2024-12-26 PROCEDURE — 83735 ASSAY OF MAGNESIUM: CPT

## 2024-12-26 PROCEDURE — 86923 COMPATIBILITY TEST ELECTRIC: CPT

## 2024-12-26 PROCEDURE — 86901 BLOOD TYPING SEROLOGIC RH(D): CPT

## 2024-12-26 PROCEDURE — 86900 BLOOD TYPING SEROLOGIC ABO: CPT

## 2024-12-26 PROCEDURE — 99285 EMERGENCY DEPT VISIT HI MDM: CPT

## 2024-12-26 PROCEDURE — P9016 RBC LEUKOCYTES REDUCED: HCPCS

## 2024-12-26 PROCEDURE — 85025 COMPLETE CBC W/AUTO DIFF WBC: CPT

## 2024-12-26 PROCEDURE — 80053 COMPREHEN METABOLIC PANEL: CPT

## 2024-12-26 PROCEDURE — 36430 TRANSFUSION BLD/BLD COMPNT: CPT

## 2024-12-26 PROCEDURE — 86850 RBC ANTIBODY SCREEN: CPT

## 2024-12-26 PROCEDURE — 85014 HEMATOCRIT: CPT

## 2024-12-26 RX ORDER — ONDANSETRON 4 MG/1
4 TABLET, FILM COATED ORAL 3 TIMES DAILY PRN
Qty: 15 TABLET | Refills: 0 | Status: SHIPPED | OUTPATIENT
Start: 2024-12-26

## 2024-12-26 RX ORDER — SODIUM CHLORIDE 9 MG/ML
INJECTION, SOLUTION INTRAVENOUS PRN
Status: DISCONTINUED | OUTPATIENT
Start: 2024-12-26 | End: 2024-12-26 | Stop reason: HOSPADM

## 2024-12-26 ASSESSMENT — PAIN - FUNCTIONAL ASSESSMENT
PAIN_FUNCTIONAL_ASSESSMENT: NONE - DENIES PAIN
PAIN_FUNCTIONAL_ASSESSMENT: 0-10
PAIN_FUNCTIONAL_ASSESSMENT: 0-10
PAIN_FUNCTIONAL_ASSESSMENT: NONE - DENIES PAIN

## 2024-12-26 ASSESSMENT — ENCOUNTER SYMPTOMS
NAUSEA: 1
SHORTNESS OF BREATH: 0
VOMITING: 1
ABDOMINAL PAIN: 0
COUGH: 0
ABDOMINAL DISTENTION: 0

## 2024-12-26 ASSESSMENT — PAIN DESCRIPTION - DESCRIPTORS: DESCRIPTORS: ACHING

## 2024-12-26 ASSESSMENT — LIFESTYLE VARIABLES
HOW MANY STANDARD DRINKS CONTAINING ALCOHOL DO YOU HAVE ON A TYPICAL DAY: PATIENT DOES NOT DRINK
HOW OFTEN DO YOU HAVE A DRINK CONTAINING ALCOHOL: NEVER

## 2024-12-26 ASSESSMENT — PAIN DESCRIPTION - ORIENTATION: ORIENTATION: RIGHT

## 2024-12-26 ASSESSMENT — PAIN SCALES - GENERAL
PAINLEVEL_OUTOF10: 9
PAINLEVEL_OUTOF10: 8

## 2024-12-26 ASSESSMENT — PAIN DESCRIPTION - LOCATION
LOCATION: BACK
LOCATION: BACK

## 2024-12-26 NOTE — ED PROVIDER NOTES
Social Connections     Frequency of Communication with Friends and Family: Not asked     Frequency of Social Gatherings with Friends and Family: Not asked   Intimate Partner Violence: Unknown (3/20/2021)    Received from [facility]    Intimate Partner Violence     Fear of Current or Ex-Partner: Not asked     Emotionally Abused: Not asked     Physically Abused: Not asked     Sexually Abused: Not asked   Housing Stability: Low Risk  (11/7/2024)    Housing Stability Vital Sign     Unable to Pay for Housing in the Last Year: No     Number of Times Moved in the Last Year: 0     Homeless in the Last Year: No        Previous Medications    AMIODARONE (CORDARONE) 200 MG TABLET    Take 1 tablet by mouth daily    APIXABAN (ELIQUIS) 5 MG TABS TABLET    Take 1 tablet by mouth 2 times daily    ATORVASTATIN (LIPITOR) 40 MG TABLET    Take 1 tablet by mouth daily    COMBIVENT RESPIMAT  MCG/ACT AERS INHALER    INHALE 2 PUFFS EVERY 6 (SIX) HOURS AS NEEDED (WHEEZING, SHORTNESS OF BREATH)    DEXLANSOPRAZOLE (DEXILANT) 60 MG CPDR DELAYED RELEASE CAPSULE    TAKE 1 CAPSULE (60 MG) BY MOUTH DAILY    FLUTICASONE-UMECLIDIN-VILANT (TRELEGY ELLIPTA) 200-62.5-25 MCG/INH AEPB    INHALE 1 PUFF DAILY    LEVOTHYROXINE (SYNTHROID) 88 MCG TABLET    Take 1 tablet by mouth every morning (before breakfast)    MAGNESIUM OXIDE (MAG-OX) 400 MG TABLET    Take 1 tablet by mouth daily    NEBIVOLOL (BYSTOLIC) 10 MG TABLET    Take 1 tablet by mouth daily    NITROGLYCERIN (NITROSTAT) 0.4 MG SL TABLET    Place 1 tablet under the tongue every 5 minutes as needed for Chest pain    OXYCODONE (OXY-IR) 15 MG IMMEDIATE RELEASE TABLET    TAKE 1 TABLET EVERY 6 HOURS    PROMETHAZINE (PHENERGAN) 25 MG TABLET    ONE TABLET EVERY 6 HOURS AS NEEDED FOR NAUSEA    ROFLUMILAST (DALIRESP) 500 MCG TABLET    Take 1 tablet by mouth daily    SEMAGLUTIDE, 2 MG/DOSE, (OZEMPIC, 2 MG/DOSE,) 8 MG/3ML SOPN SC INJECTION    Inject 0.75 mLs into the skin every 7 days  EVERY 6 HOURS AS NEEDED FOR NAUSEA    ROFLUMILAST (DALIRESP) 500 MCG TABLET    Take 1 tablet by mouth daily    SEMAGLUTIDE, 2 MG/DOSE, (OZEMPIC, 2 MG/DOSE,) 8 MG/3ML SOPN SC INJECTION    Inject 0.75 mLs into the skin every 7 days    SERTRALINE (ZOLOFT) 25 MG TABLET    Take 1 tablet by mouth daily    SPIRONOLACTONE-HYDROCHLOROTHIAZIDE (ALDACTAZIDE) 25-25 MG PER TABLET    Take 1 tablet by mouth daily    ZOLPIDEM (AMBIEN) 10 MG TABLET    Take 1 tablet by mouth nightly as needed for Sleep for up to 60 days. Max Daily Amount: 10 mg        No results found for any visits on 12/26/24.      No orders to display                No results for input(s): \"COVID19\" in the last 72 hours.    Voice dictation software was used during the making of this note.  This software is not perfect and grammatical and other typographical errors may be present.  This note has not been completely proofread for errors.     Isaías Mar MD  01/01/25 [id number]

## 2024-12-26 NOTE — ED TRIAGE NOTES
Pt ambulatory to ED w/ cc of fatigue, weakness, vomiting x 1 month. Pt was informed Monday that she had a low Hgb by her PCP. Pt endorses black tarry stools x 2-3 wks, dizziness when rising from seated position. Pt A&O x 4

## 2024-12-26 NOTE — CONSENT
Informed Consent for Blood Component Transfusion Note    I have discussed with the patient the rationale for blood component transfusion; its benefits in treating or preventing fatigue, organ damage, or death; and its risk which includes mild transfusion reactions, rare risk of blood borne infection, or more serious but rare reactions. I have discussed the alternatives to transfusion, including the risk and consequences of not receiving transfusion. The patient had an opportunity to ask questions and had agreed to proceed with transfusion of blood components.    Electronically signed by Isaías aMr MD on 12/26/24 at 4:21 PM EST

## 2024-12-26 NOTE — DISCHARGE INSTRUCTIONS
Hold your Eliquis tonight  Continue your present medications otherwise  You have been given 1 unit of packed red blood cells  Follow-up with your primary care provider within the next 7 to 10 days may return to the emergency room at anytime with worsening/black stools or other changes  Nausea, as needed: Zofran  May need further GI workup

## 2024-12-27 LAB
ABO + RH BLD: NORMAL
BLD PROD TYP BPU: NORMAL
BLOOD BANK BLOOD PRODUCT EXPIRATION DATE: NORMAL
BLOOD BANK DISPENSE STATUS: NORMAL
BLOOD BANK ISBT PRODUCT BLOOD TYPE: 6200
BLOOD BANK PRODUCT CODE: NORMAL
BLOOD BANK UNIT TYPE AND RH: NORMAL
BLOOD GROUP ANTIBODIES SERPL: NORMAL
BPU ID: NORMAL
CROSSMATCH RESULT: NORMAL
SPECIMEN EXP DATE BLD: NORMAL
UNIT DIVISION: 0
UNIT ISSUE DATE/TIME: NORMAL

## 2025-01-06 ENCOUNTER — OFFICE VISIT (OUTPATIENT)
Dept: FAMILY MEDICINE CLINIC | Facility: CLINIC | Age: 70
End: 2025-01-06
Payer: MEDICARE

## 2025-01-06 VITALS
TEMPERATURE: 98 F | WEIGHT: 149.6 LBS | DIASTOLIC BLOOD PRESSURE: 64 MMHG | HEART RATE: 69 BPM | OXYGEN SATURATION: 98 % | HEIGHT: 65 IN | SYSTOLIC BLOOD PRESSURE: 120 MMHG | BODY MASS INDEX: 24.93 KG/M2

## 2025-01-06 DIAGNOSIS — N28.1 BILATERAL RENAL CYSTS: ICD-10-CM

## 2025-01-06 DIAGNOSIS — D50.8 OTHER IRON DEFICIENCY ANEMIA: Primary | ICD-10-CM

## 2025-01-06 LAB
BASOPHILS # BLD: 0.1 K/UL (ref 0–0.2)
BASOPHILS NFR BLD: 1 % (ref 0–2)
DIFFERENTIAL METHOD BLD: ABNORMAL
EOSINOPHIL # BLD: 0 K/UL (ref 0–0.8)
EOSINOPHIL NFR BLD: 0 % (ref 0.5–7.8)
ERYTHROCYTE [DISTWIDTH] IN BLOOD BY AUTOMATED COUNT: 17.2 % (ref 11.9–14.6)
HCT VFR BLD AUTO: 28.4 % (ref 35.8–46.3)
HGB BLD-MCNC: 8.7 G/DL (ref 11.7–15.4)
IMM GRANULOCYTES # BLD AUTO: 0 K/UL (ref 0–0.5)
IMM GRANULOCYTES NFR BLD AUTO: 0 % (ref 0–5)
LYMPHOCYTES # BLD: 1.1 K/UL (ref 0.5–4.6)
LYMPHOCYTES NFR BLD: 12 % (ref 13–44)
MCH RBC QN AUTO: 26.4 PG (ref 26.1–32.9)
MCHC RBC AUTO-ENTMCNC: 30.6 G/DL (ref 31.4–35)
MCV RBC AUTO: 86.1 FL (ref 82–102)
MONOCYTES # BLD: 0.8 K/UL (ref 0.1–1.3)
MONOCYTES NFR BLD: 8 % (ref 4–12)
NEUTS SEG # BLD: 7.7 K/UL (ref 1.7–8.2)
NEUTS SEG NFR BLD: 79 % (ref 43–78)
NRBC # BLD: 0 K/UL (ref 0–0.2)
PLATELET # BLD AUTO: 334 K/UL (ref 150–450)
PMV BLD AUTO: 10.2 FL (ref 9.4–12.3)
RBC # BLD AUTO: 3.3 M/UL (ref 4.05–5.2)
WBC # BLD AUTO: 9.7 K/UL (ref 4.3–11.1)

## 2025-01-06 PROCEDURE — G8399 PT W/DXA RESULTS DOCUMENT: HCPCS | Performed by: FAMILY MEDICINE

## 2025-01-06 PROCEDURE — 99213 OFFICE O/P EST LOW 20 MIN: CPT | Performed by: FAMILY MEDICINE

## 2025-01-06 PROCEDURE — G8427 DOCREV CUR MEDS BY ELIG CLIN: HCPCS | Performed by: FAMILY MEDICINE

## 2025-01-06 PROCEDURE — 1090F PRES/ABSN URINE INCON ASSESS: CPT | Performed by: FAMILY MEDICINE

## 2025-01-06 PROCEDURE — 1123F ACP DISCUSS/DSCN MKR DOCD: CPT | Performed by: FAMILY MEDICINE

## 2025-01-06 PROCEDURE — G8420 CALC BMI NORM PARAMETERS: HCPCS | Performed by: FAMILY MEDICINE

## 2025-01-06 PROCEDURE — 3074F SYST BP LT 130 MM HG: CPT | Performed by: FAMILY MEDICINE

## 2025-01-06 PROCEDURE — 1159F MED LIST DOCD IN RCRD: CPT | Performed by: FAMILY MEDICINE

## 2025-01-06 PROCEDURE — 1036F TOBACCO NON-USER: CPT | Performed by: FAMILY MEDICINE

## 2025-01-06 PROCEDURE — 3078F DIAST BP <80 MM HG: CPT | Performed by: FAMILY MEDICINE

## 2025-01-06 PROCEDURE — M1308 PR FLU IMMUNIZE NO ADMIN: HCPCS | Performed by: FAMILY MEDICINE

## 2025-01-06 PROCEDURE — 3017F COLORECTAL CA SCREEN DOC REV: CPT | Performed by: FAMILY MEDICINE

## 2025-01-06 ASSESSMENT — PATIENT HEALTH QUESTIONNAIRE - PHQ9
SUM OF ALL RESPONSES TO PHQ QUESTIONS 1-9: 9
8. MOVING OR SPEAKING SO SLOWLY THAT OTHER PEOPLE COULD HAVE NOTICED. OR THE OPPOSITE, BEING SO FIGETY OR RESTLESS THAT YOU HAVE BEEN MOVING AROUND A LOT MORE THAN USUAL: NOT AT ALL
10. IF YOU CHECKED OFF ANY PROBLEMS, HOW DIFFICULT HAVE THESE PROBLEMS MADE IT FOR YOU TO DO YOUR WORK, TAKE CARE OF THINGS AT HOME, OR GET ALONG WITH OTHER PEOPLE: SOMEWHAT DIFFICULT
SUM OF ALL RESPONSES TO PHQ QUESTIONS 1-9: 9
9. THOUGHTS THAT YOU WOULD BE BETTER OFF DEAD, OR OF HURTING YOURSELF: NOT AT ALL
6. FEELING BAD ABOUT YOURSELF - OR THAT YOU ARE A FAILURE OR HAVE LET YOURSELF OR YOUR FAMILY DOWN: NOT AT ALL
SUM OF ALL RESPONSES TO PHQ QUESTIONS 1-9: 9
SUM OF ALL RESPONSES TO PHQ9 QUESTIONS 1 & 2: 0
5. POOR APPETITE OR OVEREATING: NEARLY EVERY DAY
7. TROUBLE CONCENTRATING ON THINGS, SUCH AS READING THE NEWSPAPER OR WATCHING TELEVISION: NOT AT ALL
SUM OF ALL RESPONSES TO PHQ QUESTIONS 1-9: 9
3. TROUBLE FALLING OR STAYING ASLEEP: NEARLY EVERY DAY
4. FEELING TIRED OR HAVING LITTLE ENERGY: NEARLY EVERY DAY
1. LITTLE INTEREST OR PLEASURE IN DOING THINGS: NOT AT ALL
2. FEELING DOWN, DEPRESSED OR HOPELESS: NOT AT ALL

## 2025-01-08 ENCOUNTER — CARE COORDINATION (OUTPATIENT)
Dept: CARE COORDINATION | Facility: CLINIC | Age: 70
End: 2025-01-08

## 2025-01-08 ASSESSMENT — ENCOUNTER SYMPTOMS
PHOTOPHOBIA: 0
SHORTNESS OF BREATH: 0
COUGH: 0
COLOR CHANGE: 0
SORE THROAT: 0
EYE PAIN: 0
ABDOMINAL DISTENTION: 0
NAUSEA: 0
BLOOD IN STOOL: 0
ABDOMINAL PAIN: 0

## 2025-01-08 NOTE — CARE COORDINATION
Ambulatory Care Coordination Note     1/8/2025 3:35 PM     ACM outreach attempt by this ACM today to offer care management services. ACM was unable to reach the patient by telephone today;   left voice message requesting a return phone call to this ACM.     ACM: Jocelyn Mauricio RN     PCP/Specialist follow up:   Future Appointments         Provider Specialty Dept Phone    1/9/2025 7:45 AM [facility] NUCLEAR STRESS Cardiology [phone]    2/17/2025 9:45 AM Derrek Gonzalez MD Cardiology [phone]    3/6/2025 8:00 AM Stephen Joseph MD Cardiology [phone]    3/11/2025 8:00 AM Samir Mcwilliams MD Family Medicine [phone]    6/26/2025 1:00 PM Stephen Joseph MD Cardiology [phone]            Follow Up:   Plan for next ACM outreach in approximately 1-2 days  to complete:  - outreach attempt to offer care management services.

## 2025-01-09 ENCOUNTER — CARE COORDINATION (OUTPATIENT)
Dept: CARE COORDINATION | Facility: CLINIC | Age: 70
End: 2025-01-09

## 2025-01-09 ENCOUNTER — TELEPHONE (OUTPATIENT)
Age: 70
End: 2025-01-09

## 2025-01-09 NOTE — CARE COORDINATION
Ambulatory Care Coordination Note     2025 2:32 PM     Patient Current Location:  Home: [address]  [place] [address]     This patient was received as a referral from [facility] health report .    ACM contacted the patient by telephone. Verified name and  with patient as identifiers. Provided introduction to self, and explanation of the ACM role.   Patient accepted care management services at this time.          ACM: Jocelyn Mauricio RN     Challenges to be reviewed by the provider   Additional needs identified to be addressed with provider No  none               Method of communication with provider: none.    Utilization: N/A - Initial Call     Care Summary Note:     Discussed ED MITCHEL for anemia, most recent lab results. Pt had f/u w/ PCP 25.  Pt reports fatigue. Has EGD/COLO scheduled 27. Denies bleeding. Will follow for outcome.     Assessments Completed:   General Assessment    Do you have any symptoms that are causing concern?: No          Medications Reviewed:   Pt not at home    Advance Care Planning:   Not reviewed during this call     Care Planning:    Goals Addressed                   This Visit's Progress     Conditions and Symptoms        I will schedule office visits, as directed by my provider.  I will keep my appointment or reschedule if I have to cancel.  I will notify my provider of any barriers to my plan of care.  I will notify my provider of any symptoms that indicate a worsening of my condition.    Barriers: none  Plan for overcoming my barriers: N/A  Confidence: 9/10  Anticipated Goal Completion Date: 25                 PCP/Specialist follow up:   Future Appointments         Provider Specialty Dept Phone    2025 9:45 AM Derrek Gonzalez MD Cardiology [phone]    3/6/2025 8:00 AM Stephen Joseph MD Cardiology [phone]    3/11/2025 8:00 AM Samir Mcwilliams MD Family Medicine [phone]    2025 1:00 PM Stephen Joseph MD

## 2025-01-09 NOTE — TELEPHONE ENCOUNTER
Cardiac Clearance        Physician or Practice Requesting:Gastroenterology  : Eve  Contact Phone Number: [phone]  Fax Number: [phone]  Date of Surgery/Procedure: 01/14/25  Type of Surgery or Procedure: upper endo/colonoscopy  Type of Anesthesia: ?  Type of Clearance Requested: Cardiac Clearance and Medication Hold  Medication to Hold:?  Days to Hold: ?

## 2025-01-09 NOTE — PROGRESS NOTES
Lachelle Quintero  1955 is a 69 y.o. female ,Established patient, here for evaluation of the following chief complaint(s):   Chief Complaint   Patient presents with   • 2 Week Follow-Up   • Medication Refill          1. Other iron deficiency anemia  -     CBC with Auto Differential; Future  2. Bilateral renal cysts  -     CT ABDOMEN RENAL MASS Additional Contrast? Radiologist Recommendation; Future        Return in about 2 months (around 3/6/2025).        Subjective   SUBJECTIVE/OBJECTIVE:  She needs multiphasic ct to characterize renal lesions.    She will see gi tomorrow    Medication Refill  This is a chronic problem. The current episode started more than 1 year ago. The problem occurs every several days. The problem has been unchanged. Pertinent negatives include no abdominal pain, chest pain, chills, coughing, fever, headaches, joint swelling, myalgias, nausea, rash, sore throat or weakness.   Anemia  Presents for follow-up visit. There has been no abdominal pain, confusion, fever, light-headedness, malaise/fatigue, pallor, palpitations or paresthesias.     Review of Systems   Constitutional:  Negative for chills, fever and malaise/fatigue.   HENT:  Negative for ear pain, hearing loss and sore throat.    Eyes:  Negative for photophobia and pain.   Respiratory:  Negative for cough and shortness of breath.    Cardiovascular:  Negative for chest pain, palpitations and leg swelling.   Gastrointestinal:  Negative for abdominal distention, abdominal pain, blood in stool and nausea.   Genitourinary:  Negative for dysuria and urgency.   Musculoskeletal:  Negative for joint swelling and myalgias.   Skin:  Negative for color change, pallor and rash.   Neurological:  Negative for speech difficulty, weakness, light-headedness, headaches and paresthesias.   Hematological:  Negative for adenopathy.   Psychiatric/Behavioral:  Negative for agitation, confusion, hallucinations, self-injury and suicidal ideas.

## 2025-01-09 NOTE — TELEPHONE ENCOUNTER
When interruption of novel anticoagulation therapy is deemed necessary, the agent is usually stopped no more than 2-3 days before the procedure ( and is restarted postoperatively as soon as bleeding risk allows, typically 12 to 24 hours after surgery.     As always, temporary discontinuation of anticoagulation therapy does place patient at minimally increased risk of embolic events.  A risk-benefit decision should be made by the physician performing procedure when deciding to hold anticoagulation.

## 2025-01-09 NOTE — TELEPHONE ENCOUNTER
Eve from Gastroenterologu states pt needs cardiac clearance for colon/ uppr endo on 1/14/25  and also needs to know if pt can hold her Eliquis 48 hrs prior to surg. Nurse will check with Dr Joseph and then will let her know. Eve said we can fax over clearance note to her. linh

## 2025-01-09 NOTE — TELEPHONE ENCOUNTER
Dr Joseph's Note faxed To Eve at gastorenterology Regarding cardiac clearance and medictation being held.

## 2025-01-16 ENCOUNTER — CARE COORDINATION (OUTPATIENT)
Dept: CARE COORDINATION | Facility: CLINIC | Age: 70
End: 2025-01-16

## 2025-01-16 NOTE — CARE COORDINATION
Ambulatory Care Coordination Note     1/16/2025 12:03 PM     ACM outreach attempt by this ACM today to perform care management follow up . ACM was unable to reach the patient by telephone today;   left voice message requesting a return phone call to this ACM.     ACM: Jocelyn Mauricio RN     PCP/Specialist follow up:   Future Appointments         Provider Specialty Dept Phone    1/21/2025 9:00 AM (Arrive by 8:45 AM) [facility] Radiology [phone]    2/17/2025 9:45 AM Derrek Gonzalez MD Cardiology [phone]    3/6/2025 8:00 AM Stephen Joseph MD Cardiology [phone]    3/11/2025 8:00 AM Samir Mcwilliams MD Family Medicine [phone]    6/26/2025 1:00 PM Stephen Joseph MD Cardiology [phone]            Follow Up:   Plan for next ACM outreach in approximately 1 week to complete:  - goal progression.

## 2025-01-24 ENCOUNTER — CARE COORDINATION (OUTPATIENT)
Dept: CARE COORDINATION | Facility: CLINIC | Age: 70
End: 2025-01-24

## 2025-01-24 NOTE — CARE COORDINATION
Ambulatory Care Coordination Note     1/24/2025 1:13 PM     ACM outreach attempt by this ACM today to perform care management follow up . ACM was unable to reach the patient by telephone today;   left voice message requesting a return phone call to this ACM.     ACM: Jocelyn Mauricio RN       PCP/Specialist follow up:   Future Appointments         Provider Specialty Dept Phone    2/17/2025 9:45 AM Derrek Gonzalez MD Cardiology [phone]    3/6/2025 8:00 AM Stephen Joseph MD Cardiology [phone]    3/11/2025 8:00 AM Samir Mcwilliams MD Family Medicine [phone]    6/26/2025 1:00 PM Stephen Joseph MD Cardiology [phone]            Follow Up:   Plan for next ACM outreach in approximately 1 week to complete:  - goal progression.

## 2025-02-03 ENCOUNTER — CARE COORDINATION (OUTPATIENT)
Dept: CARE COORDINATION | Facility: CLINIC | Age: 70
End: 2025-02-03

## 2025-02-03 NOTE — CARE COORDINATION
Ambulatory Care Coordination Note     2/3/2025 11:10 AM     patient outreach attempt by this ACM today to perform care management follow up . ACM was unable to reach the patient by telephone today;   left voice message requesting a return phone call to this ACM.     Patient closed (unable to reach patient) from the High Risk Care Management program on 2/3/2025.

## 2025-02-11 DIAGNOSIS — E11.65 TYPE 2 DIABETES MELLITUS WITH HYPERGLYCEMIA, WITHOUT LONG-TERM CURRENT USE OF INSULIN (HCC): ICD-10-CM

## 2025-02-11 DIAGNOSIS — E66.9 OBESITY (BMI 30-39.9): Primary | ICD-10-CM

## 2025-02-11 RX ORDER — SEMAGLUTIDE 2.68 MG/ML
2 INJECTION, SOLUTION SUBCUTANEOUS
Qty: 3 ML | Refills: 12 | Status: SHIPPED | OUTPATIENT
Start: 2025-02-11

## 2025-02-11 NOTE — TELEPHONE ENCOUNTER
Tried calling [employer], closed for lunch so I called [facility] and they told me that the medication went through did not need a PA. Pt is ok with us sending to [facility].   Requested Prescriptions     Pending Prescriptions Disp Refills    semaglutide, 2 MG/DOSE, (OZEMPIC, 2 MG/DOSE,) 8 MG/3ML SOPN sc injection 3 mL 12     Sig: Inject 2 mg into the skin every 7 days

## 2025-02-17 ENCOUNTER — OFFICE VISIT (OUTPATIENT)
Age: 70
End: 2025-02-17
Payer: MEDICARE

## 2025-02-17 VITALS
DIASTOLIC BLOOD PRESSURE: 58 MMHG | SYSTOLIC BLOOD PRESSURE: 118 MMHG | BODY MASS INDEX: 24.83 KG/M2 | HEIGHT: 65 IN | HEART RATE: 65 BPM | WEIGHT: 149 LBS

## 2025-02-17 DIAGNOSIS — I48.0 PAROXYSMAL ATRIAL FIBRILLATION (HCC): Primary | ICD-10-CM

## 2025-02-17 DIAGNOSIS — I10 HTN (HYPERTENSION), BENIGN: ICD-10-CM

## 2025-02-17 PROCEDURE — 3078F DIAST BP <80 MM HG: CPT | Performed by: INTERNAL MEDICINE

## 2025-02-17 PROCEDURE — G8399 PT W/DXA RESULTS DOCUMENT: HCPCS | Performed by: INTERNAL MEDICINE

## 2025-02-17 PROCEDURE — 1123F ACP DISCUSS/DSCN MKR DOCD: CPT | Performed by: INTERNAL MEDICINE

## 2025-02-17 PROCEDURE — 93000 ELECTROCARDIOGRAM COMPLETE: CPT | Performed by: INTERNAL MEDICINE

## 2025-02-17 PROCEDURE — 1125F AMNT PAIN NOTED PAIN PRSNT: CPT | Performed by: INTERNAL MEDICINE

## 2025-02-17 PROCEDURE — 1160F RVW MEDS BY RX/DR IN RCRD: CPT | Performed by: INTERNAL MEDICINE

## 2025-02-17 PROCEDURE — 1036F TOBACCO NON-USER: CPT | Performed by: INTERNAL MEDICINE

## 2025-02-17 PROCEDURE — 3074F SYST BP LT 130 MM HG: CPT | Performed by: INTERNAL MEDICINE

## 2025-02-17 PROCEDURE — 99215 OFFICE O/P EST HI 40 MIN: CPT | Performed by: INTERNAL MEDICINE

## 2025-02-17 PROCEDURE — 1090F PRES/ABSN URINE INCON ASSESS: CPT | Performed by: INTERNAL MEDICINE

## 2025-02-17 PROCEDURE — 3017F COLORECTAL CA SCREEN DOC REV: CPT | Performed by: INTERNAL MEDICINE

## 2025-02-17 PROCEDURE — G8420 CALC BMI NORM PARAMETERS: HCPCS | Performed by: INTERNAL MEDICINE

## 2025-02-17 PROCEDURE — G8427 DOCREV CUR MEDS BY ELIG CLIN: HCPCS | Performed by: INTERNAL MEDICINE

## 2025-02-17 PROCEDURE — 1159F MED LIST DOCD IN RCRD: CPT | Performed by: INTERNAL MEDICINE

## 2025-02-17 RX ORDER — NEBIVOLOL 10 MG/1
10 TABLET ORAL DAILY
Qty: 90 TABLET | Refills: 3 | Status: SHIPPED | OUTPATIENT
Start: 2025-02-17

## 2025-02-17 RX ORDER — NITROGLYCERIN 0.4 MG/1
0.4 TABLET SUBLINGUAL EVERY 5 MIN PRN
Qty: 25 TABLET | Refills: 1 | Status: SHIPPED | OUTPATIENT
Start: 2025-02-17

## 2025-02-17 RX ORDER — ATORVASTATIN CALCIUM 40 MG/1
40 TABLET, FILM COATED ORAL DAILY
Qty: 90 TABLET | Refills: 3 | Status: SHIPPED | OUTPATIENT
Start: 2025-02-17 | End: 2026-02-12

## 2025-02-17 RX ORDER — SPIRONOLACTONE AND HYDROCHLOROTHIAZIDE 25; 25 MG/1; MG/1
1 TABLET ORAL DAILY
Qty: 90 TABLET | Refills: 3 | Status: SHIPPED | OUTPATIENT
Start: 2025-02-17

## 2025-02-17 RX ORDER — AMIODARONE HYDROCHLORIDE 200 MG/1
200 TABLET ORAL DAILY
Qty: 30 TABLET | Refills: 1 | Status: SHIPPED | OUTPATIENT
Start: 2025-02-17 | End: 2025-02-17 | Stop reason: ALTCHOICE

## 2025-02-17 ASSESSMENT — ENCOUNTER SYMPTOMS
GASTROINTESTINAL NEGATIVE: 1
ALLERGIC/IMMUNOLOGIC NEGATIVE: 1
EYES NEGATIVE: 1
SHORTNESS OF BREATH: 0

## 2025-02-17 NOTE — PROGRESS NOTES
[facility] CARDIOLOGY  [address] [place] [address], [address]  [place] [address]  PHONE: [phone]        25      NAME:  Lachelle Quintero  : 1955  MRN: [id number]     Referring Cardiologist: Stephen Joseph II, MD    Reason for Consultation: Atrial fibrillation and flutter     ASSESSMENT and PLAN:  Lachelle was seen today for irregular heart beat, coronary artery disease and hypertension.    Diagnoses and all orders for this visit:    Atrial fibrillation, persistent     Typical atrial flutter (HCC) s/p ablation in 2023    Atherosclerotic heart disease of native coronary artery with other forms of angina pectoris (HCC)    HTN (hypertension), benign    COPD (chronic obstructive pulmonary disease) with chronic bronchitis    MUSHTAQ (obstructive sleep apnea)    Chronic kidney disease due to hypertension    Severe obesity (BMI 35.0-39.9) with comorbidity (HCC)    69 year old female with MMP here for severely symptomatic AFL with RVR, typical appearing. She also was admitted in 2024 with AF with RVR, failed cardioversions, went into NSR after initiating amiodarone.     -AF - in NSR today. Appeared to be atypical flutter when OOR.   -Unable to tolerate amiodarone due to side effects. So will stop today.   -Plan for AF ablation given severe symptoms.    -Risks include COPD, arrhythmia, use of OAC, CKD.    -N/V - likely related to amiodarone use, will stop and follow symptoms.     -CKD - stable. Routine lab assessment.     -COPD - stable. per pulmonary.     -HTN - stable, continue current therapy with changes noted above.     -Dyspnea - pt had stress test ordered, will look to reschedule.     -HLD - stable, continue statin.     -Routine cardiac care per Dr. Joseph.     -EP Follow up as scheduled or PRN.    Procedure to be performed: AF ablation, higher risk 2/2 COPD. Will plan to use isuprel to attempt to induce atypical flutter.   Device/ablation Company: [employer]  Procedure Date: TBD  Medications to hold, days

## 2025-02-21 ENCOUNTER — TELEPHONE (OUTPATIENT)
Age: 70
End: 2025-02-21

## 2025-02-21 DIAGNOSIS — I48.0 PAROXYSMAL ATRIAL FIBRILLATION (HCC): Primary | ICD-10-CM

## 2025-02-21 PROBLEM — I48.91 AF (ATRIAL FIBRILLATION) (HCC): Status: ACTIVE | Noted: 2025-02-21

## 2025-02-25 ENCOUNTER — TELEPHONE (OUTPATIENT)
Age: 70
End: 2025-02-25

## 2025-02-25 DIAGNOSIS — I48.0 PAROXYSMAL ATRIAL FIBRILLATION (HCC): ICD-10-CM

## 2025-02-25 LAB
ANION GAP SERPL CALC-SCNC: 11 MMOL/L (ref 7–16)
BASOPHILS # BLD: 0.06 K/UL (ref 0–0.2)
BASOPHILS NFR BLD: 0.9 % (ref 0–2)
BUN SERPL-MCNC: 16 MG/DL (ref 8–23)
CALCIUM SERPL-MCNC: 9.4 MG/DL (ref 8.8–10.2)
CHLORIDE SERPL-SCNC: 101 MMOL/L (ref 98–107)
CO2 SERPL-SCNC: 26 MMOL/L (ref 20–29)
CREAT SERPL-MCNC: 1.4 MG/DL (ref 0.6–1.1)
DIFFERENTIAL METHOD BLD: ABNORMAL
EOSINOPHIL # BLD: 0.08 K/UL (ref 0–0.8)
EOSINOPHIL NFR BLD: 1.2 % (ref 0.5–7.8)
ERYTHROCYTE [DISTWIDTH] IN BLOOD BY AUTOMATED COUNT: 20.8 % (ref 11.9–14.6)
GLUCOSE SERPL-MCNC: 114 MG/DL (ref 70–99)
HCT VFR BLD AUTO: 20.8 % (ref 35.8–46.3)
HGB BLD-MCNC: 6.4 G/DL (ref 11.7–15.4)
IMM GRANULOCYTES # BLD AUTO: 0.02 K/UL (ref 0–0.5)
IMM GRANULOCYTES NFR BLD AUTO: 0.3 % (ref 0–5)
LYMPHOCYTES # BLD: 1.26 K/UL (ref 0.5–4.6)
LYMPHOCYTES NFR BLD: 19 % (ref 13–44)
MAGNESIUM SERPL-MCNC: 1.7 MG/DL (ref 1.8–2.4)
MCH RBC QN AUTO: 28.2 PG (ref 26.1–32.9)
MCHC RBC AUTO-ENTMCNC: 30.8 G/DL (ref 31.4–35)
MCV RBC AUTO: 91.6 FL (ref 82–102)
MONOCYTES # BLD: 0.49 K/UL (ref 0.1–1.3)
MONOCYTES NFR BLD: 7.4 % (ref 4–12)
NEUTS SEG # BLD: 4.72 K/UL (ref 1.7–8.2)
NEUTS SEG NFR BLD: 71.2 % (ref 43–78)
NRBC # BLD: 0 K/UL (ref 0–0.2)
PLATELET # BLD AUTO: 397 K/UL (ref 150–450)
PMV BLD AUTO: 9.9 FL (ref 9.4–12.3)
POTASSIUM SERPL-SCNC: 4.3 MMOL/L (ref 3.5–5.1)
RBC # BLD AUTO: 2.27 M/UL (ref 4.05–5.2)
SODIUM SERPL-SCNC: 137 MMOL/L (ref 136–145)
WBC # BLD AUTO: 6.6 K/UL (ref 4.3–11.1)

## 2025-02-25 NOTE — TELEPHONE ENCOUNTER
I called and informed pt.to head to the [facility] ER for critically low Hgb.Pt.v/u.

## 2025-02-26 ENCOUNTER — HOSPITAL ENCOUNTER (OUTPATIENT)
Age: 70
Setting detail: OBSERVATION
Discharge: HOME | End: 2025-02-28
Attending: STUDENT IN AN ORGANIZED HEALTH CARE EDUCATION/TRAINING PROGRAM | Admitting: INTERNAL MEDICINE
Payer: MEDICARE

## 2025-02-26 DIAGNOSIS — D64.9 SYMPTOMATIC ANEMIA: Primary | ICD-10-CM

## 2025-02-26 PROBLEM — M47.817 LUMBOSACRAL SPONDYLOSIS WITHOUT MYELOPATHY: Status: ACTIVE | Noted: 2025-02-26

## 2025-02-26 PROBLEM — M96.1 POSTLAMINECTOMY SYNDROME, NOT ELSEWHERE CLASSIFIED: Status: ACTIVE | Noted: 2025-02-26

## 2025-02-26 PROBLEM — K92.1 MELENA: Status: ACTIVE | Noted: 2025-01-07

## 2025-02-26 PROBLEM — K21.9 GERD (GASTROESOPHAGEAL REFLUX DISEASE): Status: ACTIVE | Noted: 2024-08-08

## 2025-02-26 PROBLEM — R13.10 DYSPHAGIA: Status: ACTIVE | Noted: 2023-02-22

## 2025-02-26 LAB
ALBUMIN SERPL-MCNC: 3.4 G/DL (ref 3.2–4.6)
ALBUMIN/GLOB SERPL: 1.2 (ref 1–1.9)
ALP SERPL-CCNC: 53 U/L (ref 35–104)
ALT SERPL-CCNC: 13 U/L (ref 8–45)
ANION GAP SERPL CALC-SCNC: 10 MMOL/L (ref 7–16)
AST SERPL-CCNC: 21 U/L (ref 15–37)
BASOPHILS # BLD: 0.04 K/UL (ref 0–0.2)
BASOPHILS NFR BLD: 0.6 % (ref 0–2)
BILIRUB SERPL-MCNC: 0.4 MG/DL (ref 0–1.2)
BUN SERPL-MCNC: 15 MG/DL (ref 8–23)
CALCIUM SERPL-MCNC: 9.2 MG/DL (ref 8.8–10.2)
CHLORIDE SERPL-SCNC: 102 MMOL/L (ref 98–107)
CO2 SERPL-SCNC: 24 MMOL/L (ref 20–29)
CREAT SERPL-MCNC: 1.34 MG/DL (ref 0.6–1.1)
DIFFERENTIAL METHOD BLD: ABNORMAL
EOSINOPHIL # BLD: 0.04 K/UL (ref 0–0.8)
EOSINOPHIL NFR BLD: 0.6 % (ref 0.5–7.8)
ERYTHROCYTE [DISTWIDTH] IN BLOOD BY AUTOMATED COUNT: 17.4 % (ref 11.9–14.6)
FERRITIN SERPL-MCNC: 78 NG/ML (ref 8–388)
FOLATE SERPL-MCNC: 16 NG/ML (ref 3.1–17.5)
GLOBULIN SER CALC-MCNC: 2.9 G/DL (ref 2.3–3.5)
GLUCOSE SERPL-MCNC: 84 MG/DL (ref 70–99)
HCT VFR BLD AUTO: 20.5 % (ref 35.8–46.3)
HCT VFR BLD AUTO: 24.7 % (ref 35.8–46.3)
HGB BLD-MCNC: 6.1 G/DL (ref 11.7–15.4)
HGB BLD-MCNC: 7.6 G/DL (ref 11.7–15.4)
HISTORY CHECK: NORMAL
IMM GRANULOCYTES # BLD AUTO: 0.04 K/UL (ref 0–0.5)
IMM GRANULOCYTES NFR BLD AUTO: 0.6 % (ref 0–5)
INR PPP: 1
IRON SATN MFR SERPL: 20 % (ref 20–50)
IRON SERPL-MCNC: 89 UG/DL (ref 35–100)
LYMPHOCYTES # BLD: 1.71 K/UL (ref 0.5–4.6)
LYMPHOCYTES NFR BLD: 24.5 % (ref 13–44)
MCH RBC QN AUTO: 25.2 PG (ref 26.1–32.9)
MCHC RBC AUTO-ENTMCNC: 29.8 G/DL (ref 31.4–35)
MCV RBC AUTO: 84.7 FL (ref 82–102)
MONOCYTES # BLD: 0.59 K/UL (ref 0.1–1.3)
MONOCYTES NFR BLD: 8.5 % (ref 4–12)
NEUTS SEG # BLD: 4.55 K/UL (ref 1.7–8.2)
NEUTS SEG NFR BLD: 65.2 % (ref 43–78)
NRBC # BLD: 0.03 K/UL (ref 0–0.2)
PLATELET # BLD AUTO: 363 K/UL (ref 150–450)
PMV BLD AUTO: 10 FL (ref 9.4–12.3)
POTASSIUM SERPL-SCNC: 4 MMOL/L (ref 3.5–5.1)
PROT SERPL-MCNC: 6.2 G/DL (ref 6.3–8.2)
PROTHROMBIN TIME: 13.5 SEC (ref 11.3–14.9)
RBC # BLD AUTO: 2.42 M/UL (ref 4.05–5.2)
SODIUM SERPL-SCNC: 136 MMOL/L (ref 136–145)
TIBC SERPL-MCNC: 438 UG/DL (ref 240–450)
UIBC SERPL-MCNC: 349 UG/DL (ref 112–347)
VIT B12 SERPL-MCNC: 348 PG/ML (ref 193–986)
WBC # BLD AUTO: 7 K/UL (ref 4.3–11.1)

## 2025-02-26 PROCEDURE — 86902 BLOOD TYPE ANTIGEN DONOR EA: CPT

## 2025-02-26 PROCEDURE — 36430 TRANSFUSION BLD/BLD COMPNT: CPT

## 2025-02-26 PROCEDURE — 86920 COMPATIBILITY TEST SPIN: CPT

## 2025-02-26 PROCEDURE — 86850 RBC ANTIBODY SCREEN: CPT

## 2025-02-26 PROCEDURE — 86922 COMPATIBILITY TEST ANTIGLOB: CPT

## 2025-02-26 PROCEDURE — 80053 COMPREHEN METABOLIC PANEL: CPT

## 2025-02-26 PROCEDURE — 86901 BLOOD TYPING SEROLOGIC RH(D): CPT

## 2025-02-26 PROCEDURE — 36415 COLL VENOUS BLD VENIPUNCTURE: CPT

## 2025-02-26 PROCEDURE — 86921 COMPATIBILITY TEST INCUBATE: CPT

## 2025-02-26 PROCEDURE — G0378 HOSPITAL OBSERVATION PER HR: HCPCS

## 2025-02-26 PROCEDURE — P9016 RBC LEUKOCYTES REDUCED: HCPCS

## 2025-02-26 PROCEDURE — 85014 HEMATOCRIT: CPT

## 2025-02-26 PROCEDURE — 86870 RBC ANTIBODY IDENTIFICATION: CPT

## 2025-02-26 PROCEDURE — 82746 ASSAY OF FOLIC ACID SERUM: CPT

## 2025-02-26 PROCEDURE — 85018 HEMOGLOBIN: CPT

## 2025-02-26 PROCEDURE — 86900 BLOOD TYPING SEROLOGIC ABO: CPT

## 2025-02-26 PROCEDURE — 2500000003 HC RX 250 WO HCPCS: Performed by: INTERNAL MEDICINE

## 2025-02-26 PROCEDURE — 85025 COMPLETE CBC W/AUTO DIFF WBC: CPT

## 2025-02-26 PROCEDURE — 83540 ASSAY OF IRON: CPT

## 2025-02-26 PROCEDURE — 82728 ASSAY OF FERRITIN: CPT

## 2025-02-26 PROCEDURE — 82607 VITAMIN B-12: CPT

## 2025-02-26 PROCEDURE — 6370000000 HC RX 637 (ALT 250 FOR IP): Performed by: INTERNAL MEDICINE

## 2025-02-26 PROCEDURE — 83550 IRON BINDING TEST: CPT

## 2025-02-26 PROCEDURE — 86905 BLOOD TYPING RBC ANTIGENS: CPT

## 2025-02-26 PROCEDURE — 99285 EMERGENCY DEPT VISIT HI MDM: CPT

## 2025-02-26 PROCEDURE — 85610 PROTHROMBIN TIME: CPT

## 2025-02-26 RX ORDER — ONDANSETRON 4 MG/1
4 TABLET, ORALLY DISINTEGRATING ORAL EVERY 8 HOURS PRN
Status: DISCONTINUED | OUTPATIENT
Start: 2025-02-26 | End: 2025-02-28 | Stop reason: HOSPADM

## 2025-02-26 RX ORDER — ATORVASTATIN CALCIUM 40 MG/1
40 TABLET, FILM COATED ORAL DAILY
Status: DISCONTINUED | OUTPATIENT
Start: 2025-02-27 | End: 2025-02-26

## 2025-02-26 RX ORDER — ROFLUMILAST 500 UG/1
500 TABLET ORAL DAILY
Status: DISCONTINUED | OUTPATIENT
Start: 2025-02-26 | End: 2025-02-28 | Stop reason: HOSPADM

## 2025-02-26 RX ORDER — LEVOTHYROXINE SODIUM 88 UG/1
88 TABLET ORAL
Status: DISCONTINUED | OUTPATIENT
Start: 2025-02-27 | End: 2025-02-28 | Stop reason: HOSPADM

## 2025-02-26 RX ORDER — SODIUM CHLORIDE 0.9 % (FLUSH) 0.9 %
5-40 SYRINGE (ML) INJECTION PRN
Status: DISCONTINUED | OUTPATIENT
Start: 2025-02-26 | End: 2025-02-28 | Stop reason: HOSPADM

## 2025-02-26 RX ORDER — METOPROLOL SUCCINATE 100 MG/1
100 TABLET, EXTENDED RELEASE ORAL DAILY
Status: DISCONTINUED | OUTPATIENT
Start: 2025-02-27 | End: 2025-02-28 | Stop reason: HOSPADM

## 2025-02-26 RX ORDER — ONDANSETRON 2 MG/ML
4 INJECTION INTRAMUSCULAR; INTRAVENOUS EVERY 6 HOURS PRN
Status: DISCONTINUED | OUTPATIENT
Start: 2025-02-26 | End: 2025-02-28 | Stop reason: HOSPADM

## 2025-02-26 RX ORDER — SODIUM CHLORIDE 9 MG/ML
INJECTION, SOLUTION INTRAVENOUS PRN
Status: DISCONTINUED | OUTPATIENT
Start: 2025-02-26 | End: 2025-02-28 | Stop reason: HOSPADM

## 2025-02-26 RX ORDER — ATORVASTATIN CALCIUM 40 MG/1
40 TABLET, FILM COATED ORAL NIGHTLY
Status: DISCONTINUED | OUTPATIENT
Start: 2025-02-26 | End: 2025-02-28 | Stop reason: HOSPADM

## 2025-02-26 RX ORDER — ACETAMINOPHEN 325 MG/1
650 TABLET ORAL EVERY 6 HOURS PRN
Status: DISCONTINUED | OUTPATIENT
Start: 2025-02-26 | End: 2025-02-28 | Stop reason: HOSPADM

## 2025-02-26 RX ORDER — POTASSIUM CHLORIDE 7.45 MG/ML
10 INJECTION INTRAVENOUS PRN
Status: DISCONTINUED | OUTPATIENT
Start: 2025-02-26 | End: 2025-02-28 | Stop reason: HOSPADM

## 2025-02-26 RX ORDER — BUDESONIDE 0.5 MG/2ML
1 INHALANT ORAL
Status: DISCONTINUED | OUTPATIENT
Start: 2025-02-26 | End: 2025-02-28 | Stop reason: HOSPADM

## 2025-02-26 RX ORDER — SODIUM CHLORIDE 0.9 % (FLUSH) 0.9 %
5-40 SYRINGE (ML) INJECTION EVERY 12 HOURS SCHEDULED
Status: DISCONTINUED | OUTPATIENT
Start: 2025-02-26 | End: 2025-02-28 | Stop reason: HOSPADM

## 2025-02-26 RX ORDER — PANTOPRAZOLE SODIUM 40 MG/1
40 TABLET, DELAYED RELEASE ORAL
Status: DISCONTINUED | OUTPATIENT
Start: 2025-02-27 | End: 2025-02-28 | Stop reason: HOSPADM

## 2025-02-26 RX ORDER — IPRATROPIUM BROMIDE AND ALBUTEROL SULFATE 2.5; .5 MG/3ML; MG/3ML
1 SOLUTION RESPIRATORY (INHALATION) EVERY 4 HOURS PRN
Status: DISCONTINUED | OUTPATIENT
Start: 2025-02-26 | End: 2025-02-28 | Stop reason: HOSPADM

## 2025-02-26 RX ORDER — ZOLPIDEM TARTRATE 5 MG/1
10 TABLET ORAL NIGHTLY
Status: DISCONTINUED | OUTPATIENT
Start: 2025-02-26 | End: 2025-02-28 | Stop reason: HOSPADM

## 2025-02-26 RX ORDER — MAGNESIUM SULFATE IN WATER 40 MG/ML
2000 INJECTION, SOLUTION INTRAVENOUS PRN
Status: DISCONTINUED | OUTPATIENT
Start: 2025-02-26 | End: 2025-02-28 | Stop reason: HOSPADM

## 2025-02-26 RX ORDER — OXYCODONE HYDROCHLORIDE 15 MG/1
15 TABLET ORAL EVERY 6 HOURS PRN
Status: DISCONTINUED | OUTPATIENT
Start: 2025-02-26 | End: 2025-02-28 | Stop reason: HOSPADM

## 2025-02-26 RX ORDER — ACETAMINOPHEN 650 MG/1
650 SUPPOSITORY RECTAL EVERY 6 HOURS PRN
Status: DISCONTINUED | OUTPATIENT
Start: 2025-02-26 | End: 2025-02-28 | Stop reason: HOSPADM

## 2025-02-26 RX ORDER — POTASSIUM CHLORIDE 1500 MG/1
40 TABLET, EXTENDED RELEASE ORAL PRN
Status: DISCONTINUED | OUTPATIENT
Start: 2025-02-26 | End: 2025-02-28 | Stop reason: HOSPADM

## 2025-02-26 RX ORDER — ARFORMOTEROL TARTRATE 15 UG/2ML
15 SOLUTION RESPIRATORY (INHALATION)
Status: DISCONTINUED | OUTPATIENT
Start: 2025-02-26 | End: 2025-02-28 | Stop reason: HOSPADM

## 2025-02-26 RX ORDER — POLYETHYLENE GLYCOL 3350 17 G/17G
17 POWDER, FOR SOLUTION ORAL DAILY PRN
Status: DISCONTINUED | OUTPATIENT
Start: 2025-02-26 | End: 2025-02-28 | Stop reason: HOSPADM

## 2025-02-26 RX ADMIN — ZOLPIDEM TARTRATE 10 MG: 5 TABLET, FILM COATED ORAL at 22:03

## 2025-02-26 RX ADMIN — OXYCODONE 15 MG: 15 TABLET ORAL at 18:38

## 2025-02-26 RX ADMIN — ATORVASTATIN CALCIUM 40 MG: 40 TABLET, FILM COATED ORAL at 22:03

## 2025-02-26 RX ADMIN — SODIUM CHLORIDE, PRESERVATIVE FREE 10 ML: 5 INJECTION INTRAVENOUS at 20:42

## 2025-02-26 RX ADMIN — POLYETHYLENE GLYCOL 3350 17 G: 17 POWDER, FOR SOLUTION ORAL at 18:39

## 2025-02-26 ASSESSMENT — PAIN SCALES - GENERAL
PAINLEVEL_OUTOF10: 9
PAINLEVEL_OUTOF10: 0

## 2025-02-26 ASSESSMENT — PAIN DESCRIPTION - LOCATION: LOCATION: BACK

## 2025-02-26 ASSESSMENT — PAIN DESCRIPTION - DESCRIPTORS: DESCRIPTORS: THROBBING

## 2025-02-26 ASSESSMENT — PAIN DESCRIPTION - ORIENTATION: ORIENTATION: LOWER;POSTERIOR

## 2025-02-26 ASSESSMENT — PAIN DESCRIPTION - PAIN TYPE: TYPE: ACUTE PAIN

## 2025-02-26 ASSESSMENT — PAIN DESCRIPTION - ONSET: ONSET: GRADUAL

## 2025-02-26 ASSESSMENT — PAIN DESCRIPTION - FREQUENCY: FREQUENCY: CONTINUOUS

## 2025-02-26 ASSESSMENT — PAIN - FUNCTIONAL ASSESSMENT
PAIN_FUNCTIONAL_ASSESSMENT: NONE - DENIES PAIN
PAIN_FUNCTIONAL_ASSESSMENT: ACTIVITIES ARE NOT PREVENTED

## 2025-02-26 NOTE — ED NOTES
TRANSFER - OUT REPORT:    Verbal report given to Arlene MCGEE on Lachelle Quintreo  being transferred to [facility] for routine progression of patient care       Report consisted of patient's Situation, Background, Assessment and   Recommendations(SBAR).     Information from the following report(s) Nurse Handoff Report, ED Encounter Summary, and ED SBAR was reviewed with the receiving nurse.    Matador Fall Assessment:    Presents to emergency department  because of falls (Syncope, seizure, or loss of consciousness): No  Age > 70: No  Altered Mental Status, Intoxication with alcohol or substance confusion (Disorientation, impaired judgment, poor safety awaremess, or inability to follow instructions): No  Impaired Mobility: Ambulates or transfers with assistive devices or assistance; Unable to ambulate or transer.: No  Nursing Judgement: No          Lines:   Peripheral IV 02/26/25 Right Antecubital (Active)        Opportunity for questions and clarification was provided.      Patient transported with:  Registered Nurse          Maldonado Watson RN  02/26/25 [id number]

## 2025-02-26 NOTE — CONSENT
Informed Consent for Blood Component Transfusion Note    I have discussed with the patient the rationale for blood component transfusion; its benefits in treating or preventing fatigue, organ damage, or death; and its risk which includes mild transfusion reactions, rare risk of blood borne infection, or more serious but rare reactions. I have discussed the alternatives to transfusion, including the risk and consequences of not receiving transfusion. The patient had an opportunity to ask questions and had agreed to proceed with transfusion of blood components.    Electronically signed by ERICK Damon on 2/26/25 at 2:37 PM EST

## 2025-02-26 NOTE — ED TRIAGE NOTES
Pt ambulatory to ED w/ cc of fatigue/weakness x 2 wks. Pt was seen by cardiologist and was informed her hgb was 6.2 and she should come to ED. Pt A&O x 4

## 2025-02-26 NOTE — H&P
,      Hospitalist History and Physical   Admit Date:  2025  1:30 PM   Name:  Lachelle Quintero   Age:  70 y.o.  Sex:  female  :  1955   MRN:  [id number]   Room:  [place] [address]    Presenting/Chief Complaint: Fatigue     Reason(s) for Admission: Symptomatic anemia [D64.9]     History of Present Illness:   Lachelle Quintero is a 70 y.o. female with medical history of atrial fibrillation/persistent atrial flutter, coronary artery disease, hypertension, COPD, obstructive sleep apnea, chronic kidney disease, Alicia's esophagus, and anemia presents with symptomatic anemia and worsening chronic fatigue over the past 2 weeks. Patient reports occasional lightheadedness but denies shortness of breath and chest pain. Symptoms started around Mariann with hemoglobin at 7.1, at which time she received one unit of blood. Recent blood work showed hemoglobin at 6.2, and current hemoglobin is 6.1.    The patient has been experiencing daily vomiting since the first week of November, with the last episode on . The vomit is mainly liquid, green or yellow, and occasionally grayish. A colonoscopy 3 weeks ago showed bi-polyps, which were removed and found to be benign. Patient was also diagnosed with Alicia's esophagus, with no bleeding ulcers noted on EGD    She has been on Eliquis for anticoagulation but has been off it for a planned cardiac ablation next Tuesday. Patient quit smoking 12-13 years ago and denies alcohol use.    Given her low hemoglobin, patient was started on blood transfusion and hospitalist was consulted for admission.      Assessment & Plan:     Symptomatic Anemia  Currently has symptomatic anemia with hemoglobin of 6.1. Patient reports worsening chronic fatigue over the past 2 weeks and occasional lightheadedness. History of recent blood transfusion in December. Possible microscopic bleeding from Eliquis use? Differential diagnoses include iron deficiency anemia and folate deficiency.  - Admit patient for  AND SCREEN    Collection Time: 02/26/25  1:32 PM   Result Value Ref Range    Crossmatch expiration date 03/01/2025,2359     ABO/Rh A POSITIVE     Antibody Screen POS     Blood Bank Comment       ANTIBODY HISTORY CALLED TO ERICK MUNIZ AT 1445 2.26.25 ERB    Antibody Ident Anti-K     Unit Number [id number]     Product Code Blood Bank RC LR     Unit Divison 00     Dispense Status Blood Bank ALLOCATED     Antigen/Antibody K NEGATIVE,     Crossmatch Result Compatible    Iron and TIBC    Collection Time: 02/26/25  1:32 PM   Result Value Ref Range    Iron 89 35 - 100 ug/dL    TIBC 438 240 - 450 ug/dL    Iron % Saturation 20 20 - 50 %    UIBC 349.0 (H) 112.0 - 347.0 ug/dL   Ferritin    Collection Time: 02/26/25  1:32 PM   Result Value Ref Range    Ferritin 78 8 - 388 NG/ML       No results for input(s): \"COVID19\" in the last 72 hours.    Echo (TTE) complete (PRN contrast/bubble/strain/3D)    Result Date: 2/25/2025    Left Ventricle: Normal left ventricular systolic function with a visually estimated EF of 65 - 70%. Left ventricle size is normal. Increased wall thickness. Normal wall motion. Normal diastolic function.   Mitral Valve: Mild annular calcification. Mild regurgitation.   Tricuspid Valve: The estimated RVSP is 33 mmHg.   Aorta: Ao root diameter is 3.0 cm. Ao Root Index is 1.71 cm/m2.   Image quality is good.         Signed:  Samir Vaughn MD    Part of this note may have been written by using a voice dictation software.  The note has been proof read but may still contain some grammatical/other typographical errors.

## 2025-02-26 NOTE — PROGRESS NOTES
TRANSFER - IN REPORT:    Verbal report received from Ryan MCGEE on Lachelle Quintero  being received from ED for routine progression of patient care      Report consisted of patient's Situation, Background, Assessment and   Recommendations(SBAR).     Information from the following report(s) Nurse Handoff Report was reviewed with the receiving nurse.    Opportunity for questions and clarification was provided.      Assessment to be completed upon patient's arrival to unit and care assumed.      Primary RN Rhiannon given report from this RN

## 2025-02-26 NOTE — ACP (ADVANCE CARE PLANNING)
Advance Care Planning Note   Admit Date:  2025  1:30 PM   Name:  Lachelle Quintero   Age:  70 y.o.  Sex:  female  :  1955   MRN:  [id number]   Room:  [place] [address]    Lachelle Quintero has capacity to make her own decisions:   Yes    If pt unable to make decisions, POA/surrogate decision maker:  Grandchild    Other people present:   granddaughter    Patient / surrogate decision-maker directed code status:  Full Code    Other ACP topics discussed, if applicable:   Discussed possibility of hospice or comfort measures.      Patient or surrogate consented to discussion of the current conditions, workup, management plans, prognosis, and the risk for further deterioration.  Time spent: 16 minutes in direct discussion.      Signed:  Samir Vaughn MD

## 2025-02-26 NOTE — ED PROVIDER NOTES
exacerbation or progression.  Drug therapy given requiring intensive monitoring for toxicity.  Chronic medical problems impacting care include anticoagulated.  Shared medical decision making was utilized in creating the patients health plan today.  I independently ordered and reviewed each unique test.    I reviewed external records: provider visit note from outside specialist.  I reviewed external records: previous lab results from outside ED.  I reviewed external records: previous imaging study including radiologist interpretation.  Reviewed notes from gastroenterology 1/14/2025; reviewed notes from cardiology 2/17/2025; reviewed prior lab work, reviewed prior image          The patient was admitted and I have discussed patient management with the admitting provider.      Critical care procedure note : 30 minutes of critical care time was performed in the emergency department. This was separate from any other procedures listed during the patients emergency department course. The failure to initiate these interventions on an urgent basis would likely have resulted in sudden, clinically significant or life-threatening deterioration in the patients condition.     History     70-year-old female with history of GERD, COPD, atherosclerotic heart disease, stage III chronic kidney disease, atrial fibrillation anticoagulated on Eliquis but has been off of her Eliquis for the past couple of days as she is scheduled to have an ablation by cardiology in 6 days.  Patient states she had lab work done by her cardiologist for this procedure.  They contacted her and told her that her hemoglobin was 6.2.  Triage note states that she has been feeling fatigued and weak for about 2 weeks but she says honestly this has been going on basically since the use ember of 2024. Pt states that she has been vomiting essentially daily since November 2024.  Sometimes she will vomit up to 5 times per day.  She thought that perhaps this was  signs per unit routine    Initiate Hypoglycemia Treatment    Up as tolerated    Place intermittent pneumatic compression device    Full code    Initiate Oxygen Therapy Protocol    TYPE AND SCREEN    PREPARE RBC (CROSSMATCH), 1 Units    Place in Observation Service        Medications given during this emergency department visit:     Medications   0.9 % sodium chloride infusion (has no administration in time range)   atorvastatin (LIPITOR) tablet 40 mg (has no administration in time range)   ipratropium 0.5 mg-albuterol 2.5 mg (DUONEB) nebulizer solution 1 Dose (has no administration in time range)   pantoprazole (PROTONIX) tablet 40 mg (has no administration in time range)   budesonide (PULMICORT) nebulizer suspension 1,000 mcg (has no administration in time range)     And   arformoterol tartrate (BROVANA) nebulizer solution 15 mcg (has no administration in time range)     And   ipratropium (ATROVENT) 0.02 % nebulizer solution 0.5 mg (has no administration in time range)   levothyroxine (SYNTHROID) tablet 88 mcg (has no administration in time range)   metoprolol succinate (TOPROL XL) extended release tablet 100 mg (has no administration in time range)   oxyCODONE (OXY-IR) immediate release tablet 15 mg (has no administration in time range)   Roflumilast (DALIRESP) tablet 500 mcg (has no administration in time range)   zolpidem (AMBIEN) tablet 10 mg (has no administration in time range)   sodium chloride flush 0.9 % injection 5-40 mL (has no administration in time range)   sodium chloride flush 0.9 % injection 5-40 mL (has no administration in time range)   0.9 % sodium chloride infusion (has no administration in time range)   potassium chloride (KLOR-CON M) extended release tablet 40 mEq (has no administration in time range)     Or   potassium bicarb-citric acid (EFFER-K) effervescent tablet 40 mEq (has no administration in time range)     Or   potassium chloride 10 mEq/100 mL IVPB (Peripheral Line) (has no

## 2025-02-27 DIAGNOSIS — D50.9 IRON DEFICIENCY ANEMIA, UNSPECIFIED IRON DEFICIENCY ANEMIA TYPE: Primary | ICD-10-CM

## 2025-02-27 PROBLEM — T45.515A ADVERSE EFFECT OF ANTICOAGULANT: Status: ACTIVE | Noted: 2025-02-27

## 2025-02-27 PROBLEM — E61.1 IRON DEFICIENCY: Status: ACTIVE | Noted: 2025-02-27

## 2025-02-27 LAB
25(OH)D3 SERPL-MCNC: 18.8 NG/ML (ref 30–100)
BASOPHILS # BLD: 0.06 K/UL (ref 0–0.2)
BASOPHILS NFR BLD: 0.9 % (ref 0–2)
BILIRUB DIRECT SERPL-MCNC: <0.2 MG/DL (ref 0–0.4)
BILIRUB INDIRECT SERPL-MCNC: NORMAL MG/DL (ref 0–1.1)
BILIRUB SERPL-MCNC: 0.4 MG/DL (ref 0–1.2)
DIFFERENTIAL METHOD BLD: ABNORMAL
EOSINOPHIL # BLD: 0.08 K/UL (ref 0–0.8)
EOSINOPHIL NFR BLD: 1.3 % (ref 0.5–7.8)
ERYTHROCYTE [DISTWIDTH] IN BLOOD BY AUTOMATED COUNT: 16.8 % (ref 11.9–14.6)
HCT VFR BLD AUTO: 22.6 % (ref 35.8–46.3)
HCT VFR BLD AUTO: 26.5 % (ref 35.8–46.3)
HGB BLD-MCNC: 7 G/DL (ref 11.7–15.4)
HGB BLD-MCNC: 8.4 G/DL (ref 11.7–15.4)
HGB RETIC QN AUTO: 19 PG (ref 29–35)
HISTORY CHECK: NORMAL
IMM GRANULOCYTES # BLD AUTO: 0.03 K/UL (ref 0–0.5)
IMM GRANULOCYTES NFR BLD AUTO: 0.5 % (ref 0–5)
IMM RETICS NFR: 36.5 % (ref 3–15.9)
LDH SERPL L TO P-CCNC: 191 U/L (ref 127–281)
LYMPHOCYTES # BLD: 1.89 K/UL (ref 0.5–4.6)
LYMPHOCYTES NFR BLD: 29.9 % (ref 13–44)
MAGNESIUM SERPL-MCNC: 2.2 MG/DL (ref 1.8–2.4)
MCH RBC QN AUTO: 26.8 PG (ref 26.1–32.9)
MCHC RBC AUTO-ENTMCNC: 31 G/DL (ref 31.4–35)
MCV RBC AUTO: 86.6 FL (ref 82–102)
MONOCYTES # BLD: 0.62 K/UL (ref 0.1–1.3)
MONOCYTES NFR BLD: 9.8 % (ref 4–12)
NEUTS SEG # BLD: 3.64 K/UL (ref 1.7–8.2)
NEUTS SEG NFR BLD: 57.6 % (ref 43–78)
NRBC # BLD: 0 K/UL (ref 0–0.2)
PLATELET # BLD AUTO: 287 K/UL (ref 150–450)
PMV BLD AUTO: 9.6 FL (ref 9.4–12.3)
RBC # BLD AUTO: 2.61 M/UL (ref 4.05–5.2)
RETICS # AUTO: 0.05 M/UL (ref 0.03–0.1)
RETICS/RBC NFR AUTO: 1.8 % (ref 0.3–2)
WBC # BLD AUTO: 6.3 K/UL (ref 4.3–11.1)

## 2025-02-27 PROCEDURE — 6370000000 HC RX 637 (ALT 250 FOR IP): Performed by: INTERNAL MEDICINE

## 2025-02-27 PROCEDURE — 2500000003 HC RX 250 WO HCPCS: Performed by: INTERNAL MEDICINE

## 2025-02-27 PROCEDURE — APPSS60 APP SPLIT SHARED TIME 46-60 MINUTES: Performed by: NURSE PRACTITIONER

## 2025-02-27 PROCEDURE — 82306 VITAMIN D 25 HYDROXY: CPT

## 2025-02-27 PROCEDURE — 85025 COMPLETE CBC W/AUTO DIFF WBC: CPT

## 2025-02-27 PROCEDURE — 96367 TX/PROPH/DG ADDL SEQ IV INF: CPT

## 2025-02-27 PROCEDURE — P9016 RBC LEUKOCYTES REDUCED: HCPCS

## 2025-02-27 PROCEDURE — 6360000002 HC RX W HCPCS: Performed by: NURSE PRACTITIONER

## 2025-02-27 PROCEDURE — 82180 ASSAY OF ASCORBIC ACID: CPT

## 2025-02-27 PROCEDURE — 86902 BLOOD TYPE ANTIGEN DONOR EA: CPT

## 2025-02-27 PROCEDURE — G0378 HOSPITAL OBSERVATION PER HR: HCPCS

## 2025-02-27 PROCEDURE — 36430 TRANSFUSION BLD/BLD COMPNT: CPT

## 2025-02-27 PROCEDURE — 2580000003 HC RX 258: Performed by: NURSE PRACTITIONER

## 2025-02-27 PROCEDURE — 6360000002 HC RX W HCPCS: Performed by: FAMILY MEDICINE

## 2025-02-27 PROCEDURE — 85046 RETICYTE/HGB CONCENTRATE: CPT

## 2025-02-27 PROCEDURE — 6370000000 HC RX 637 (ALT 250 FOR IP): Performed by: NURSE PRACTITIONER

## 2025-02-27 PROCEDURE — 82525 ASSAY OF COPPER: CPT

## 2025-02-27 PROCEDURE — 83735 ASSAY OF MAGNESIUM: CPT

## 2025-02-27 PROCEDURE — 82248 BILIRUBIN DIRECT: CPT

## 2025-02-27 PROCEDURE — 82247 BILIRUBIN TOTAL: CPT

## 2025-02-27 PROCEDURE — 36415 COLL VENOUS BLD VENIPUNCTURE: CPT

## 2025-02-27 PROCEDURE — 6360000002 HC RX W HCPCS: Performed by: INTERNAL MEDICINE

## 2025-02-27 PROCEDURE — 84630 ASSAY OF ZINC: CPT

## 2025-02-27 PROCEDURE — 96365 THER/PROPH/DIAG IV INF INIT: CPT

## 2025-02-27 PROCEDURE — 85014 HEMATOCRIT: CPT

## 2025-02-27 PROCEDURE — 83010 ASSAY OF HAPTOGLOBIN QUANT: CPT

## 2025-02-27 PROCEDURE — 83615 LACTATE (LD) (LDH) ENZYME: CPT

## 2025-02-27 PROCEDURE — 96366 THER/PROPH/DIAG IV INF ADDON: CPT

## 2025-02-27 PROCEDURE — 85018 HEMOGLOBIN: CPT

## 2025-02-27 PROCEDURE — 99223 1ST HOSP IP/OBS HIGH 75: CPT | Performed by: INTERNAL MEDICINE

## 2025-02-27 RX ORDER — MAGNESIUM SULFATE IN WATER 40 MG/ML
2000 INJECTION, SOLUTION INTRAVENOUS ONCE
Status: COMPLETED | OUTPATIENT
Start: 2025-02-27 | End: 2025-02-27

## 2025-02-27 RX ORDER — SODIUM CHLORIDE 9 MG/ML
INJECTION, SOLUTION INTRAVENOUS PRN
Status: DISCONTINUED | OUTPATIENT
Start: 2025-02-27 | End: 2025-02-28 | Stop reason: HOSPADM

## 2025-02-27 RX ORDER — LANOLIN ALCOHOL/MO/W.PET/CERES
1000 CREAM (GRAM) TOPICAL DAILY
Status: DISCONTINUED | OUTPATIENT
Start: 2025-02-27 | End: 2025-02-28 | Stop reason: HOSPADM

## 2025-02-27 RX ADMIN — CYANOCOBALAMIN TAB 1000 MCG 1000 MCG: 1000 TAB at 10:03

## 2025-02-27 RX ADMIN — METOPROLOL SUCCINATE 100 MG: 100 TABLET, EXTENDED RELEASE ORAL at 08:07

## 2025-02-27 RX ADMIN — SODIUM CHLORIDE, PRESERVATIVE FREE 10 ML: 5 INJECTION INTRAVENOUS at 08:08

## 2025-02-27 RX ADMIN — MAGNESIUM SULFATE HEPTAHYDRATE 2000 MG: 40 INJECTION, SOLUTION INTRAVENOUS at 08:00

## 2025-02-27 RX ADMIN — PANTOPRAZOLE SODIUM 40 MG: 40 TABLET, DELAYED RELEASE ORAL at 05:14

## 2025-02-27 RX ADMIN — OXYCODONE 15 MG: 15 TABLET ORAL at 05:38

## 2025-02-27 RX ADMIN — OXYCODONE 15 MG: 15 TABLET ORAL at 16:20

## 2025-02-27 RX ADMIN — SODIUM CHLORIDE 125 MG: 9 INJECTION, SOLUTION INTRAVENOUS at 15:06

## 2025-02-27 RX ADMIN — LEVOTHYROXINE SODIUM 88 MCG: 0.09 TABLET ORAL at 05:14

## 2025-02-27 RX ADMIN — ZOLPIDEM TARTRATE 10 MG: 5 TABLET, FILM COATED ORAL at 20:59

## 2025-02-27 RX ADMIN — SODIUM CHLORIDE, PRESERVATIVE FREE 10 ML: 5 INJECTION INTRAVENOUS at 20:59

## 2025-02-27 RX ADMIN — ATORVASTATIN CALCIUM 40 MG: 40 TABLET, FILM COATED ORAL at 20:59

## 2025-02-27 ASSESSMENT — PAIN DESCRIPTION - ORIENTATION
ORIENTATION: LOWER
ORIENTATION: LOWER;POSTERIOR
ORIENTATION: LOWER

## 2025-02-27 ASSESSMENT — PAIN - FUNCTIONAL ASSESSMENT
PAIN_FUNCTIONAL_ASSESSMENT: ACTIVITIES ARE NOT PREVENTED
PAIN_FUNCTIONAL_ASSESSMENT: ACTIVITIES ARE NOT PREVENTED

## 2025-02-27 ASSESSMENT — PAIN DESCRIPTION - ONSET
ONSET: ON-GOING
ONSET: ON-GOING

## 2025-02-27 ASSESSMENT — PAIN DESCRIPTION - LOCATION
LOCATION: BACK

## 2025-02-27 ASSESSMENT — PAIN DESCRIPTION - PAIN TYPE
TYPE: CHRONIC PAIN
TYPE: CHRONIC PAIN

## 2025-02-27 ASSESSMENT — PAIN SCALES - GENERAL
PAINLEVEL_OUTOF10: 8
PAINLEVEL_OUTOF10: 0
PAINLEVEL_OUTOF10: 0
PAINLEVEL_OUTOF10: 6
PAINLEVEL_OUTOF10: 0
PAINLEVEL_OUTOF10: 0
PAINLEVEL_OUTOF10: 9

## 2025-02-27 ASSESSMENT — PAIN DESCRIPTION - DESCRIPTORS
DESCRIPTORS: THROBBING
DESCRIPTORS: ACHING

## 2025-02-27 ASSESSMENT — PAIN DESCRIPTION - FREQUENCY
FREQUENCY: INTERMITTENT
FREQUENCY: INTERMITTENT

## 2025-02-27 NOTE — PROGRESS NOTES
4 Eyes Skin Assessment     NAME:  Lachelle Quintero  YOB: 1955  MEDICAL RECORD NUMBER:  [id number]    The patient is being assessed for  Admission    I agree that at least one RN has performed a thorough Head to Toe Skin Assessment on the patient. ALL assessment sites listed below have been assessed.      Areas assessed by both nurses:    Head, Face, Ears, Shoulders, Back, Chest, Arms, Elbows, Hands, Sacrum. Buttock, Coccyx, Ischium, Legs. Feet and Heels, and Under Medical Devices         Does the Patient have a Wound? Yes wound(s) were present on assessment. LDA wound assessment was Initiated and completed by RN         Small skin tear to L lower arm    Lonnie Prevention initiated by RN: Yes  Wound Care Orders initiated by RN: No  Pt turns self and up ad bernie. Skin tear SAGAR    Pressure Injury (Stage 3,4, Unstageable, DTI, NWPT, and Complex wounds) if present, place Wound referral order by RN under : No    New Ostomies, if present place, Ostomy referral order under : No     Nurse 1 eSignature: Electronically signed by Rhiannon Chung RN on 2/26/25 at 7:08 PM EST    **SHARE this note so that the co-signing nurse can place an eSignature**    Nurse 2 eSignature: {Esignature:[id number]}

## 2025-02-27 NOTE — CONSULTS
[facility] Hematology & Oncology        Inpatient Hematology / Oncology Consult    Reason for Consult:  Symptomatic anemia [D64.9]  Referring Physician:  Concepcion Gayle MD    History of Present Illness:  Ms. Quintero is a 70 y.o. female admitted on 2/26/2025. The encounter diagnosis was Symptomatic anemia..      Her PMH includes Afib on Eliquis, CAD, HTN, COPD, MUSHTAQ, CKD, Alicia's esophagus, NADEGE, and former smoker.  She presented to ED with c/o symptomatic anemia and worsening chronic fatigue over the past 2 weeks.  She also endorses occasional lightheadedness.  She reports daily vomiting for the past 3 months.  She is s/p EGD/colo ~3 weeks ago with benign polyps and Alicia's esophagus with no bleeding ulcers.  Hgb 6.1 (recent b/l ~7s).  She has a history of NADEGE dating back to at least 2011.  She was previously on Eliquis for Afib, but has been off on it recently for planned cardiac ablation.  B12 low normal at 348.  She is Fe deficient with ferritin 78 (b/l ~10s since 2022).  No folate deficiency noted.  We were consulted for anemia.      Allergies   Allergen Reactions    Ace Inhibitors Angioedema and Swelling     LIPS, FACE AND THROAT      Codeine Itching, Other (See Comments) and Nausea And Vomiting     DERIVATIVES    Other reaction(s): hives    Morphine Anxiety and Other (See Comments)     Other reaction(s): Rage  \" only in pill form,\" caused severe agitation.     Past Medical History:   Diagnosis Date    Arthritis     Asthma     Atherosclerotic heart disease of native coronary artery with other forms of angina pectoris 3/14/2022    Chronic kidney disease due to hypertension 03/14/2022    COPD (chronic obstructive pulmonary disease) (HCC) 10/5/2015    GERD (gastroesophageal reflux disease)     HTN (hypertension), benign 10/5/2015    MUSHTAQ (obstructive sleep apnea) 4/28/2016    Psychiatric disorder     anxiety    Thyroid disease      Past Surgical History:   Procedure Laterality Date    APPENDECTOMY       (H) 0.60 - 1.10 MG/DL    Est, Glom Filt Rate 43 (L) >60 ml/min/1.73m2    Calcium 9.2 8.8 - 10.2 MG/DL    Total Bilirubin 0.4 0.0 - 1.2 MG/DL    ALT 13 8 - 45 U/L    AST 21 15 - 37 U/L    Alk Phosphatase 53 35 - 104 U/L    Total Protein 6.2 (L) 6.3 - 8.2 g/dL    Albumin 3.4 3.2 - 4.6 g/dL    Globulin 2.9 2.3 - 3.5 g/dL    Albumin/Globulin Ratio 1.2 1.0 - 1.9     Protime-INR    Collection Time: 02/26/25  1:32 PM   Result Value Ref Range    Protime 13.5 11.3 - 14.9 sec    INR 1.0     TYPE AND SCREEN    Collection Time: 02/26/25  1:32 PM   Result Value Ref Range    Crossmatch expiration date 03/01/2025,2359     ABO/Rh A POSITIVE     Antibody Screen POS     Blood Bank Comment       ANTIBODY HISTORY CALLED TO ERICK MUNIZ AT 1445 2.26.25 ERB    Antibody Ident Anti-K     Unit Number [id number]     Product Code Blood Bank RC LR     Unit Divison 00     Dispense Status Blood Bank ISSUED     Unit Issue Date/Time [id number]     Product Code Blood Bank [id number]     Blood Bank Unit Type and Rh A POS     Blood Bank ISBT Product Blood Type 6200     Blood Bank Blood Product Expiration Date [id number]     Antigen/Antibody K NEGATIVE,     Crossmatch Result Compatible    Iron and TIBC    Collection Time: 02/26/25  1:32 PM   Result Value Ref Range    Iron 89 35 - 100 ug/dL    TIBC 438 240 - 450 ug/dL    Iron % Saturation 20 20 - 50 %    UIBC 349.0 (H) 112.0 - 347.0 ug/dL   Ferritin    Collection Time: 02/26/25  1:32 PM   Result Value Ref Range    Ferritin 78 8 - 388 NG/ML   PREPARE RBC (CROSSMATCH), 1 Units    Collection Time: 02/26/25  2:45 PM   Result Value Ref Range    History Check Historical check performed    Folate    Collection Time: 02/26/25  7:56 PM   Result Value Ref Range    Folate 16.0 3.1 - 17.5 ng/mL   Vitamin B12    Collection Time: 02/26/25  7:56 PM   Result Value Ref Range    Vitamin B-12 348 193 - 986 pg/mL   Hemoglobin and Hematocrit    Collection Time: 02/26/25  7:56 PM   Result Value Ref Range    Hemoglobin 7.6

## 2025-02-27 NOTE — PROGRESS NOTES
Hospitalist Progress Note   Admit Date:  2025  1:30 PM   Name:  Lachelle Quintero   Age:  70 y.o.  Sex:  female  :  1955   MRN:  [id number]   Room:  [facility]    Presenting/Chief Complaint: Fatigue     Reason(s) for Admission: Symptomatic anemia [D64.9]     Hospital Course:   Lachelle Quintero is a 70 y.o. female with medical history of atrial fibrillation/persistent atrial flutter, coronary artery disease, hypertension, COPD, obstructive sleep apnea, chronic kidney disease, Alicia's esophagus, and anemia presents with symptomatic anemia and worsening chronic fatigue over the past 2 weeks. Patient reports occasional lightheadedness but denies shortness of breath and chest pain. Symptoms started around Mariann with hemoglobin at 7.1, at which time she received one unit of blood. Recent blood work showed hemoglobin at 6.2, and current hemoglobin is 6.1.     The patient has been experiencing daily vomiting since the first week of November, with the last episode on . The vomit is mainly liquid, green or yellow, and occasionally grayish. A colonoscopy 3 weeks ago showed bi-polyps, which were removed and found to be benign. Patient was also diagnosed with Alicia's esophagus, with no bleeding ulcers noted on EGD     She has been on Eliquis for anticoagulation but has been off it for a planned cardiac ablation next Tuesday. Patient quit smoking 12-13 years ago and denies alcohol use.     Given her low hemoglobin, patient was started on blood transfusion and hospitalist was consulted for admission.      Subjective & 24hr Events:   Complains of chronic intermittent vomiting going on for several months  Hemoglobin of 7, ordered 1 more unit of PRBC, already received 1 unit of PRBC at admission  Later on today consulted hematology for anemia      Assessment & Plan:   Symptomatic Anemia  Currently has symptomatic anemia with hemoglobin of 6.1. Patient reports worsening chronic fatigue over the past 2 weeks

## 2025-02-27 NOTE — CARE COORDINATION
Admitted to observation for symptomatic anemia. Lives alone, supportive family. Independent. No previous DME/HH/SNF. Still drives. Routine DC anticipated. PONCE done.        02/27/25 0873   Service Assessment   Patient Orientation Alert and Oriented;Person;Place;Situation   Cognition Alert   History Provided By Patient   Primary Caregiver Self   Accompanied By/Relationship Son   Support Systems Family Members   Patient's Healthcare Decision Maker is: Legal Next of Kin   PCP Verified by CM Yes   Prior Functional Level Independent in ADLs/IADLs   Current Functional Level Independent in ADLs/IADLs   Can patient return to prior living arrangement Yes   Ability to make needs known: Good   Family able to assist with home care needs: Yes   Would you like for me to discuss the discharge plan with any other family members/significant others, and if so, who? Yes  (family)   Financial Resources Medicare;Medicaid   Community Resources None   CM/SW Referral Other (see comment)  (Discharge planning)   Condition of Participation: Discharge Planning   The Plan for Transition of Care is related to the following treatment goals: Anticipate routine DC when stable.

## 2025-02-28 VITALS
DIASTOLIC BLOOD PRESSURE: 62 MMHG | OXYGEN SATURATION: 99 % | HEART RATE: 63 BPM | HEIGHT: 65 IN | WEIGHT: 154.8 LBS | TEMPERATURE: 98.1 F | SYSTOLIC BLOOD PRESSURE: 146 MMHG | RESPIRATION RATE: 20 BRPM | BODY MASS INDEX: 25.79 KG/M2

## 2025-02-28 LAB
ANION GAP SERPL CALC-SCNC: 8 MMOL/L (ref 7–16)
BASOPHILS # BLD: 0.04 K/UL (ref 0–0.2)
BASOPHILS NFR BLD: 0.7 % (ref 0–2)
BUN SERPL-MCNC: 12 MG/DL (ref 8–23)
CALCIUM SERPL-MCNC: 8.9 MG/DL (ref 8.8–10.2)
CHLORIDE SERPL-SCNC: 103 MMOL/L (ref 98–107)
CO2 SERPL-SCNC: 25 MMOL/L (ref 20–29)
CREAT SERPL-MCNC: 1.44 MG/DL (ref 0.6–1.1)
DIFFERENTIAL METHOD BLD: ABNORMAL
EOSINOPHIL # BLD: 0.1 K/UL (ref 0–0.8)
EOSINOPHIL NFR BLD: 1.7 % (ref 0.5–7.8)
ERYTHROCYTE [DISTWIDTH] IN BLOOD BY AUTOMATED COUNT: 16.7 % (ref 11.9–14.6)
GLUCOSE SERPL-MCNC: 87 MG/DL (ref 70–99)
HAPTOGLOB SERPL-MCNC: 125 MG/DL (ref 30–200)
HCT VFR BLD AUTO: 24.9 % (ref 35.8–46.3)
HCT VFR BLD AUTO: 29.3 % (ref 35.8–46.3)
HEMOCCULT STL QL: NEGATIVE
HGB BLD-MCNC: 8 G/DL (ref 11.7–15.4)
HGB BLD-MCNC: 9.2 G/DL (ref 11.7–15.4)
IMM GRANULOCYTES # BLD AUTO: 0.02 K/UL (ref 0–0.5)
IMM GRANULOCYTES NFR BLD AUTO: 0.3 % (ref 0–5)
LYMPHOCYTES # BLD: 1.39 K/UL (ref 0.5–4.6)
LYMPHOCYTES NFR BLD: 23.6 % (ref 13–44)
MCH RBC QN AUTO: 26.4 PG (ref 26.1–32.9)
MCHC RBC AUTO-ENTMCNC: 32.1 G/DL (ref 31.4–35)
MCV RBC AUTO: 82.2 FL (ref 82–102)
MONOCYTES # BLD: 0.55 K/UL (ref 0.1–1.3)
MONOCYTES NFR BLD: 9.4 % (ref 4–12)
NEUTS SEG # BLD: 3.78 K/UL (ref 1.7–8.2)
NEUTS SEG NFR BLD: 64.3 % (ref 43–78)
NRBC # BLD: 0 K/UL (ref 0–0.2)
PLATELET # BLD AUTO: 254 K/UL (ref 150–450)
PMV BLD AUTO: 9.6 FL (ref 9.4–12.3)
POTASSIUM SERPL-SCNC: 4.2 MMOL/L (ref 3.5–5.1)
RBC # BLD AUTO: 3.03 M/UL (ref 4.05–5.2)
SODIUM SERPL-SCNC: 137 MMOL/L (ref 136–145)
WBC # BLD AUTO: 5.9 K/UL (ref 4.3–11.1)

## 2025-02-28 PROCEDURE — G0378 HOSPITAL OBSERVATION PER HR: HCPCS

## 2025-02-28 PROCEDURE — 82272 OCCULT BLD FECES 1-3 TESTS: CPT

## 2025-02-28 PROCEDURE — 85014 HEMATOCRIT: CPT

## 2025-02-28 PROCEDURE — 85025 COMPLETE CBC W/AUTO DIFF WBC: CPT

## 2025-02-28 PROCEDURE — 6370000000 HC RX 637 (ALT 250 FOR IP): Performed by: STUDENT IN AN ORGANIZED HEALTH CARE EDUCATION/TRAINING PROGRAM

## 2025-02-28 PROCEDURE — 85018 HEMOGLOBIN: CPT

## 2025-02-28 PROCEDURE — 36415 COLL VENOUS BLD VENIPUNCTURE: CPT

## 2025-02-28 PROCEDURE — 6370000000 HC RX 637 (ALT 250 FOR IP): Performed by: INTERNAL MEDICINE

## 2025-02-28 PROCEDURE — 80048 BASIC METABOLIC PNL TOTAL CA: CPT

## 2025-02-28 PROCEDURE — 6370000000 HC RX 637 (ALT 250 FOR IP): Performed by: NURSE PRACTITIONER

## 2025-02-28 PROCEDURE — 2500000003 HC RX 250 WO HCPCS: Performed by: INTERNAL MEDICINE

## 2025-02-28 RX ORDER — FERROUS SULFATE 325(65) MG
325 TABLET ORAL 2 TIMES DAILY
Qty: 60 TABLET | Refills: 1 | Status: SHIPPED | OUTPATIENT
Start: 2025-02-28

## 2025-02-28 RX ORDER — ATORVASTATIN CALCIUM 40 MG/1
40 TABLET, FILM COATED ORAL NIGHTLY
Qty: 90 TABLET | Refills: 1 | Status: SHIPPED | OUTPATIENT
Start: 2025-02-28

## 2025-02-28 RX ADMIN — PANTOPRAZOLE SODIUM 40 MG: 40 TABLET, DELAYED RELEASE ORAL at 06:41

## 2025-02-28 RX ADMIN — LEVOTHYROXINE SODIUM 88 MCG: 0.09 TABLET ORAL at 06:41

## 2025-02-28 RX ADMIN — OXYCODONE 15 MG: 15 TABLET ORAL at 07:11

## 2025-02-28 RX ADMIN — CYANOCOBALAMIN TAB 1000 MCG 1000 MCG: 1000 TAB at 08:07

## 2025-02-28 RX ADMIN — SODIUM CHLORIDE, PRESERVATIVE FREE 10 ML: 5 INJECTION INTRAVENOUS at 08:08

## 2025-02-28 RX ADMIN — CHOLECALCIFEROL TAB 125 MCG (5000 UNIT) 10000 UNITS: 125 TAB at 08:08

## 2025-02-28 ASSESSMENT — PAIN SCALES - GENERAL
PAINLEVEL_OUTOF10: 0
PAINLEVEL_OUTOF10: 8
PAINLEVEL_OUTOF10: 0

## 2025-02-28 ASSESSMENT — PAIN DESCRIPTION - LOCATION: LOCATION: BACK

## 2025-02-28 ASSESSMENT — PAIN DESCRIPTION - DESCRIPTORS: DESCRIPTORS: THROBBING

## 2025-02-28 ASSESSMENT — PAIN DESCRIPTION - ORIENTATION: ORIENTATION: LOWER

## 2025-02-28 ASSESSMENT — PAIN - FUNCTIONAL ASSESSMENT: PAIN_FUNCTIONAL_ASSESSMENT: PREVENTS OR INTERFERES SOME ACTIVE ACTIVITIES AND ADLS

## 2025-02-28 NOTE — DISCHARGE INSTRUCTIONS
Hold spironolactone and hydrochlorothiazide as per cardiology directions in anticipation for the procedure (ablation) next tueday 3/4

## 2025-02-28 NOTE — CARE COORDINATION
Pt is for discharge home today with family.  Pt will have close out pt care with  [facility] and PCP.  No other needs/supportive care orders recieved for MSW at this time.      02/28/25 2368   Service Assessment   Patient's Healthcare Decision Maker is: Legal Next of Kin   Services At/After Discharge   Transition of Care Consult (CM Consult) Discharge Planning   Services At/After Discharge Outpatient   [place] Resource Information Provided? No   Mode of Transport at Discharge Other (see comment)  (family)   Confirm Follow Up Transport Family   Condition of Participation: Discharge Planning   The Plan for Transition of Care is related to the following treatment goals: Anticipate routine DC when stable and follow up care with Oncology.   The Patient and/Or Patient Representative agree with the Discharge Plan? Yes

## 2025-02-28 NOTE — PROGRESS NOTES
Outpatient Pharmacy Progress Note for Meds-to-Beds    Total number of Prescriptions Filled: 2    List of Medications (name,strength):  Cyanocobalamin 1,000  ferrous [id number]      Delivered to:kory kendall      Co-pay: 2.75      Payment Type:cash      Additional Documentation:  Medication(s) were delivered to the patient's room prior to discharge      Thank you for letting us serve your patients.  [facility] [id number] [address]  Phone: [phone]  Fax: [phone]

## 2025-02-28 NOTE — DISCHARGE SUMMARY
--  0.03  --  0.02  --     < > = values in this interval not displayed.      LFT Recent Labs     02/26/25  1332 02/27/25  0524   BILITOT 0.4 0.4   BILIDIR  --  <0.2   ALKPHOS 53  --    AST 21  --    ALT 13  --    PROTEIN 6.2*  --    ALBUMIN 3.4  --    GLOB 2.9  --       Cardiac  No results found for: \"NTPROBNP\", \"TROPHS\"   Coags Lab Results   Component Value Date/Time    PROTIME 13.5 02/26/2025 01:32 PM    PROTIME 16.1 11/14/2023 01:59 PM    INR 1.0 02/26/2025 01:32 PM    INR 1.2 11/14/2023 01:59 PM      A1c Lab Results   Component Value Date/Time    LABA1C 6.1 11/01/2024 09:01 AM    LABA1C 5.7 08/13/2024 08:41 AM    LABA1C 6.1 04/29/2024 07:57 AM     11/01/2024 09:01 AM     08/13/2024 08:41 AM     04/29/2024 07:57 AM      Lipids Lab Results   Component Value Date/Time    CHOL 128 11/01/2024 09:01 AM    HDL 62 11/01/2024 09:01 AM    CHOLHDLRATIO 2.1 11/01/2024 09:01 AM    TRIG 57 11/01/2024 09:01 AM      Thyroid  Lab Results   Component Value Date/Time    TSHELE 1.31 11/08/2024 04:48 AM    TSHELE 1.53 11/19/2021 09:19 AM        Most Recent UA No results found for: \"COLORU\", \"APPEARANCE\", \"SPECGRAV\", \"LABPH\", \"PROTEINU\", \"GLUCOSEU\", \"KETUA\", \"BILIRUBINUR\", \"BLOODU\", \"UROBILINOGEN\", \"NITRU\", \"LEUKOCYTESUR\", \"WBCUA\", \"RBCUA\", \"BACTERIA\", \"LABCAST\", \"MUCUS\"     Microbiology:  Results       ** No results found for the last 336 hours. **            All Labs from Last 24 Hrs:  Recent Results (from the past 24 hour(s))   Hemoglobin and Hematocrit    Collection Time: 02/27/25  2:45 PM   Result Value Ref Range    Hemoglobin 8.4 (L) 11.7 - 15.4 g/dL    Hematocrit 26.5 (L) 35.8 - 46.3 %   CBC with Auto Differential    Collection Time: 02/28/25  3:55 AM   Result Value Ref Range    WBC 5.9 4.3 - 11.1 K/uL    RBC 3.03 (L) 4.05 - 5.2 M/uL    Hemoglobin 8.0 (L) 11.7 - 15.4 g/dL    Hematocrit 24.9 (L) 35.8 - 46.3 %    MCV 82.2 82 - 102 FL    MCH 26.4 26.1 - 32.9 PG    MCHC 32.1 31.4 - 35.0 g/dL    RDW 16.7 (H)  (37.2 °C) 64 18 (!) 127/54 98 %   02/27/25 1402 98.2 °F (36.8 °C) 59 16 (!) 110/56 98 %       Oxygen Therapy  SpO2: 99 %  Pulse via Oximetry: 63 beats per minute  O2 Device: None (Room air)    Estimated body mass index is 25.76 kg/m² as calculated from the following:    Height as of this encounter: 1.651 m (5' 5\").    Weight as of this encounter: 70.2 kg (154 lb 12.8 oz).    Intake/Output Summary (Last 24 hours) at 2/28/2025 1220  Last data filed at 2/28/2025 0905  Gross per 24 hour   Intake 1092.5 ml   Output --   Net 1092.5 ml         Physical Exam:    General:    Well nourished.  No overt distress  Head:  Normocephalic, atraumatic  Eyes:  Sclerae appear normal.  Pupils equally round.    HENT:  Nares appear normal, no drainage.  Moist mucous membranes  Neck:  No restricted ROM.  Trachea midline  CV:   RRR.  No m/r/g.  No JVD  Lungs:   CTAB.  No wheezing, rhonchi, or rales.  Respirations even, unlabored  Abdomen:   Soft, nontender, nondistended.    Extremities: Warm and dry.   No edema.    Skin:     No rashes.  Normal coloration  Neuro:  CN II-XII grossly intact.  Psych:  Normal mood and affect.        Time spent in patient discharge and coordination 60 minutes.      Signed:  Iva Vaughn MD    Part of this note may have been written by using a voice dictation software.  The note has been proof read but may still contain some grammatical/other typographical errors.

## 2025-03-01 LAB — VIT C SERPL-MCNC: 0.3 MG/DL (ref 0.4–2)

## 2025-03-02 LAB
ABO + RH BLD: NORMAL
ANTIGENS PRESENT RBC DONR: NORMAL
BLD PROD TYP BPU: NORMAL
BLOOD BANK BLOOD PRODUCT EXPIRATION DATE: NORMAL
BLOOD BANK BLOOD PRODUCT EXPIRATION DATE: NORMAL
BLOOD BANK CMNT PATIENT-IMP: NORMAL
BLOOD BANK DISPENSE STATUS: NORMAL
BLOOD BANK ISBT PRODUCT BLOOD TYPE: 6200
BLOOD BANK ISBT PRODUCT BLOOD TYPE: 6200
BLOOD BANK PRODUCT CODE: NORMAL
BLOOD BANK PRODUCT CODE: NORMAL
BLOOD BANK UNIT TYPE AND RH: NORMAL
BLOOD BANK UNIT TYPE AND RH: NORMAL
BLOOD GROUP ANTIBODIES SERPL: NORMAL
BLOOD GROUP ANTIBODIES SERPL: NORMAL
BPU ID: NORMAL
COPPER SERPL-MCNC: 87 UG/DL (ref 80–158)
CROSSMATCH RESULT: NORMAL
SPECIMEN EXP DATE BLD: NORMAL
UNIT DIVISION: 0
UNIT ISSUE DATE/TIME: NORMAL
UNIT ISSUE DATE/TIME: NORMAL
ZINC SERPL-MCNC: 65 UG/DL (ref 44–115)

## 2025-03-03 ENCOUNTER — CARE COORDINATION (OUTPATIENT)
Dept: CARE COORDINATION | Facility: CLINIC | Age: 70
End: 2025-03-03

## 2025-03-03 DIAGNOSIS — D64.9 SYMPTOMATIC ANEMIA: Primary | ICD-10-CM

## 2025-03-03 NOTE — CARE COORDINATION
Care Transitions Note    Initial Call - Call within 2 business days of discharge: Yes    Attempted to reach patient for transitions of care follow up. Unable to reach patient.    Outreach Attempts:   HIPAA compliant voicemail left for patient.     Patient: Lachelle Quintero    Patient : 1955   MRN: [id number]    Reason for Admission: Symptomatic anemia  Discharge Date: 25  RURS: No data recorded  Last Discharge Facility       Date Complaint Diagnosis Description Type Department Provider    25 Fatigue Symptomatic anemia ED to Hosp-Admission (Discharged) (ADMITTED) [id number] Iva Vaughn MD; Lavonne Harrell...            Was this an external facility discharge? No    Follow Up Appointment:   Patient has hospital follow up appointment scheduled within 14 days of discharge.    Future Appointments         Provider Specialty Dept Phone    3/5/2025 1:10 PM PERIPHERAL Lab [phone]    3/5/2025 2:00 PM Nafisa Mckeon MD Oncology [phone]    3/6/2025 8:00 AM Stephen Joseph MD Cardiology [phone]    3/11/2025 8:00 AM Samir Mcwilliams MD Family Medicine [phone]    2025 1:00 PM Stephen Joseph MD Cardiology [phone]            Plan for follow-up on next business day.      Sarah Gaines RN

## 2025-03-04 ENCOUNTER — CARE COORDINATION (OUTPATIENT)
Dept: CARE COORDINATION | Facility: CLINIC | Age: 70
End: 2025-03-04

## 2025-03-04 NOTE — CARE COORDINATION
Care Transitions Note    Initial Call - Call within 2 business days of discharge: Yes    Attempted to reach patient for transitions of care follow up. Unable to reach patient.    Outreach Attempts:   HIPAA compliant voicemail left for patient.     Patient: Lachelle Quintero    Patient : 1955   MRN: [id number]    Reason for Admission: Symptomatic anemi   Discharge Date: 25  RURS: No data recorded  Last Discharge Facility       Date Complaint Diagnosis Description Type Department Provider    25 Fatigue Symptomatic anemia ED to Hosp-Admission (Discharged) (ADMITTED) [id number] Iva Vaughn MD; Lavonne Harrell...            Was this an external facility discharge? No    Follow Up Appointment:   Patient has hospital follow up appointment scheduled within 7 days of discharge.    Future Appointments         Provider Specialty Dept Phone    3/5/2025 1:10 PM PERIPHERAL Lab [phone]    3/5/2025 2:00 PM Nafisa Mckeon MD Oncology [phone]    3/6/2025 8:00 AM Stephen Joseph MD Cardiology [phone]    3/6/2025 2:30 PM (Arrive by 2:15 PM) [facility] Radiology [phone]    3/11/2025 8:00 AM Samir Mcwilliams MD Family Medicine [phone]    2025 1:00 PM Stephen Joseph MD Cardiology [phone]            Plan for follow-up on next business day.      Sarah Gaines RN

## 2025-03-05 ENCOUNTER — OFFICE VISIT (OUTPATIENT)
Dept: ONCOLOGY | Age: 70
End: 2025-03-05
Payer: MEDICARE

## 2025-03-05 ENCOUNTER — HOSPITAL ENCOUNTER (OUTPATIENT)
Dept: LAB | Age: 70
Discharge: HOME | End: 2025-03-05
Payer: MEDICARE

## 2025-03-05 ENCOUNTER — CARE COORDINATION (OUTPATIENT)
Dept: CARE COORDINATION | Facility: CLINIC | Age: 70
End: 2025-03-05

## 2025-03-05 VITALS
DIASTOLIC BLOOD PRESSURE: 62 MMHG | RESPIRATION RATE: 18 BRPM | HEIGHT: 65 IN | SYSTOLIC BLOOD PRESSURE: 155 MMHG | OXYGEN SATURATION: 100 % | TEMPERATURE: 98.2 F | HEART RATE: 69 BPM | BODY MASS INDEX: 26.54 KG/M2 | WEIGHT: 159.3 LBS

## 2025-03-05 DIAGNOSIS — D64.9 SYMPTOMATIC ANEMIA: Primary | ICD-10-CM

## 2025-03-05 DIAGNOSIS — D50.0 IRON DEFICIENCY ANEMIA DUE TO CHRONIC BLOOD LOSS: ICD-10-CM

## 2025-03-05 DIAGNOSIS — D64.9 SYMPTOMATIC ANEMIA: ICD-10-CM

## 2025-03-05 DIAGNOSIS — Z87.19 HISTORY OF GI BLEED: ICD-10-CM

## 2025-03-05 DIAGNOSIS — E61.1 IRON DEFICIENCY: ICD-10-CM

## 2025-03-05 LAB
ALBUMIN SERPL-MCNC: 3.7 G/DL (ref 3.2–4.6)
ALBUMIN/GLOB SERPL: 1.1 (ref 1–1.9)
ALP SERPL-CCNC: 64 U/L (ref 35–104)
ALT SERPL-CCNC: 17 U/L (ref 8–45)
ANION GAP SERPL CALC-SCNC: 9 MMOL/L (ref 7–16)
AST SERPL-CCNC: 25 U/L (ref 15–37)
BASOPHILS # BLD: 0.05 K/UL (ref 0–0.2)
BASOPHILS NFR BLD: 0.9 % (ref 0–2)
BILIRUB SERPL-MCNC: 0.6 MG/DL (ref 0–1.2)
BUN SERPL-MCNC: 14 MG/DL (ref 8–23)
CALCIUM SERPL-MCNC: 9.5 MG/DL (ref 8.8–10.2)
CHLORIDE SERPL-SCNC: 103 MMOL/L (ref 98–107)
CO2 SERPL-SCNC: 27 MMOL/L (ref 20–29)
CREAT SERPL-MCNC: 1.25 MG/DL (ref 0.6–1.1)
DIFFERENTIAL METHOD BLD: ABNORMAL
EOSINOPHIL # BLD: 0.04 K/UL (ref 0–0.8)
EOSINOPHIL NFR BLD: 0.8 % (ref 0.5–7.8)
ERYTHROCYTE [DISTWIDTH] IN BLOOD BY AUTOMATED COUNT: 19.2 % (ref 11.9–14.6)
FERRITIN SERPL-MCNC: 78 NG/ML (ref 8–388)
GLOBULIN SER CALC-MCNC: 3.3 G/DL (ref 2.3–3.5)
GLUCOSE SERPL-MCNC: 77 MG/DL (ref 70–99)
HCT VFR BLD AUTO: 34.1 % (ref 35.8–46.3)
HGB BLD-MCNC: 10.2 G/DL (ref 11.7–15.4)
IMM GRANULOCYTES # BLD AUTO: 0.02 K/UL (ref 0–0.5)
IMM GRANULOCYTES NFR BLD AUTO: 0.4 % (ref 0–5)
IRON SATN MFR SERPL: 13 % (ref 20–50)
IRON SERPL-MCNC: 58 UG/DL (ref 35–100)
LYMPHOCYTES # BLD: 1.07 K/UL (ref 0.5–4.6)
LYMPHOCYTES NFR BLD: 20.3 % (ref 13–44)
MCH RBC QN AUTO: 26.2 PG (ref 26.1–32.9)
MCHC RBC AUTO-ENTMCNC: 29.9 G/DL (ref 31.4–35)
MCV RBC AUTO: 87.7 FL (ref 82–102)
MONOCYTES # BLD: 0.36 K/UL (ref 0.1–1.3)
MONOCYTES NFR BLD: 6.8 % (ref 4–12)
NEUTS SEG # BLD: 3.73 K/UL (ref 1.7–8.2)
NEUTS SEG NFR BLD: 70.8 % (ref 43–78)
NRBC # BLD: 0 K/UL (ref 0–0.2)
PLATELET # BLD AUTO: 263 K/UL (ref 150–450)
PMV BLD AUTO: 10.1 FL (ref 9.4–12.3)
POTASSIUM SERPL-SCNC: 4.3 MMOL/L (ref 3.5–5.1)
PROT SERPL-MCNC: 7 G/DL (ref 6.3–8.2)
RBC # BLD AUTO: 3.89 M/UL (ref 4.05–5.2)
SODIUM SERPL-SCNC: 139 MMOL/L (ref 136–145)
TIBC SERPL-MCNC: 442 UG/DL (ref 240–450)
UIBC SERPL-MCNC: 384 UG/DL (ref 112–347)
VIT B12 SERPL-MCNC: 947 PG/ML (ref 193–986)
WBC # BLD AUTO: 5.3 K/UL (ref 4.3–11.1)

## 2025-03-05 PROCEDURE — 82728 ASSAY OF FERRITIN: CPT

## 2025-03-05 PROCEDURE — 3077F SYST BP >= 140 MM HG: CPT | Performed by: INTERNAL MEDICINE

## 2025-03-05 PROCEDURE — 83550 IRON BINDING TEST: CPT

## 2025-03-05 PROCEDURE — 1036F TOBACCO NON-USER: CPT | Performed by: INTERNAL MEDICINE

## 2025-03-05 PROCEDURE — 82607 VITAMIN B-12: CPT

## 2025-03-05 PROCEDURE — 1160F RVW MEDS BY RX/DR IN RCRD: CPT | Performed by: INTERNAL MEDICINE

## 2025-03-05 PROCEDURE — 1125F AMNT PAIN NOTED PAIN PRSNT: CPT | Performed by: INTERNAL MEDICINE

## 2025-03-05 PROCEDURE — G8417 CALC BMI ABV UP PARAM F/U: HCPCS | Performed by: INTERNAL MEDICINE

## 2025-03-05 PROCEDURE — 1090F PRES/ABSN URINE INCON ASSESS: CPT | Performed by: INTERNAL MEDICINE

## 2025-03-05 PROCEDURE — G8399 PT W/DXA RESULTS DOCUMENT: HCPCS | Performed by: INTERNAL MEDICINE

## 2025-03-05 PROCEDURE — 1123F ACP DISCUSS/DSCN MKR DOCD: CPT | Performed by: INTERNAL MEDICINE

## 2025-03-05 PROCEDURE — G8427 DOCREV CUR MEDS BY ELIG CLIN: HCPCS | Performed by: INTERNAL MEDICINE

## 2025-03-05 PROCEDURE — 80053 COMPREHEN METABOLIC PANEL: CPT

## 2025-03-05 PROCEDURE — 83540 ASSAY OF IRON: CPT

## 2025-03-05 PROCEDURE — 3017F COLORECTAL CA SCREEN DOC REV: CPT | Performed by: INTERNAL MEDICINE

## 2025-03-05 PROCEDURE — 36415 COLL VENOUS BLD VENIPUNCTURE: CPT

## 2025-03-05 PROCEDURE — 1159F MED LIST DOCD IN RCRD: CPT | Performed by: INTERNAL MEDICINE

## 2025-03-05 PROCEDURE — 99214 OFFICE O/P EST MOD 30 MIN: CPT | Performed by: INTERNAL MEDICINE

## 2025-03-05 PROCEDURE — 3078F DIAST BP <80 MM HG: CPT | Performed by: INTERNAL MEDICINE

## 2025-03-05 PROCEDURE — 85025 COMPLETE CBC W/AUTO DIFF WBC: CPT

## 2025-03-05 RX ORDER — MEPERIDINE HYDROCHLORIDE 50 MG/ML
12.5 INJECTION INTRAMUSCULAR; INTRAVENOUS; SUBCUTANEOUS PRN
OUTPATIENT
Start: 2025-03-13

## 2025-03-05 RX ORDER — EPINEPHRINE 1 MG/ML
0.3 INJECTION, SOLUTION, CONCENTRATE INTRAVENOUS PRN
OUTPATIENT
Start: 2025-03-13

## 2025-03-05 RX ORDER — FAMOTIDINE 10 MG/ML
20 INJECTION, SOLUTION INTRAVENOUS
OUTPATIENT
Start: 2025-03-13

## 2025-03-05 RX ORDER — ONDANSETRON 2 MG/ML
8 INJECTION INTRAMUSCULAR; INTRAVENOUS
OUTPATIENT
Start: 2025-03-13

## 2025-03-05 RX ORDER — ALBUTEROL SULFATE 90 UG/1
4 INHALANT RESPIRATORY (INHALATION) PRN
OUTPATIENT
Start: 2025-03-13

## 2025-03-05 RX ORDER — SODIUM CHLORIDE 9 MG/ML
INJECTION, SOLUTION INTRAVENOUS CONTINUOUS
OUTPATIENT
Start: 2025-03-13

## 2025-03-05 RX ORDER — ACETAMINOPHEN 325 MG/1
650 TABLET ORAL
OUTPATIENT
Start: 2025-03-13

## 2025-03-05 RX ORDER — SODIUM CHLORIDE 0.9 % (FLUSH) 0.9 %
5-40 SYRINGE (ML) INJECTION PRN
OUTPATIENT
Start: 2025-03-13

## 2025-03-05 RX ORDER — ZOLPIDEM TARTRATE 10 MG/1
5 TABLET ORAL NIGHTLY PRN
COMMUNITY
Start: 2025-02-24

## 2025-03-05 RX ORDER — DIPHENHYDRAMINE HYDROCHLORIDE 50 MG/ML
50 INJECTION INTRAMUSCULAR; INTRAVENOUS
OUTPATIENT
Start: 2025-03-13

## 2025-03-05 RX ORDER — ACETAMINOPHEN 325 MG/1
650 TABLET ORAL ONCE
OUTPATIENT
Start: 2025-03-13 | End: 2025-03-13

## 2025-03-05 RX ORDER — SODIUM CHLORIDE 9 MG/ML
5-250 INJECTION, SOLUTION INTRAVENOUS PRN
OUTPATIENT
Start: 2025-03-13

## 2025-03-05 RX ORDER — HYDROCORTISONE SODIUM SUCCINATE 100 MG/2ML
100 INJECTION INTRAMUSCULAR; INTRAVENOUS
OUTPATIENT
Start: 2025-03-13

## 2025-03-05 RX ORDER — HEPARIN SODIUM (PORCINE) LOCK FLUSH IV SOLN 100 UNIT/ML 100 UNIT/ML
500 SOLUTION INTRAVENOUS PRN
OUTPATIENT
Start: 2025-03-13

## 2025-03-05 ASSESSMENT — PATIENT HEALTH QUESTIONNAIRE - PHQ9
SUM OF ALL RESPONSES TO PHQ QUESTIONS 1-9: 0
2. FEELING DOWN, DEPRESSED OR HOPELESS: NOT AT ALL
1. LITTLE INTEREST OR PLEASURE IN DOING THINGS: NOT AT ALL
SUM OF ALL RESPONSES TO PHQ QUESTIONS 1-9: 0

## 2025-03-05 NOTE — PATIENT INSTRUCTIONS
Patient Information from Today's Visit    Diagnosis:   Hospital follow up for Iron deficiency anemia      Follow Up Instructions:   IV Iron discussed:Infed  We will set you up for this to start next weekly likely     Follow up as needed.   No scheduled follow up     Treatment Summary has been discussed and given to patient: N/A      Current Labs:   Hospital Outpatient Visit on 03/05/2025   Component Date Value Ref Range Status    Vitamin B-12 03/05/2025 947  193 - 986 pg/mL Final    Iron 03/05/2025 58  35 - 100 ug/dL Final    TIBC 03/05/2025 442  240 - 450 ug/dL Final    Iron % Saturation 03/05/2025 13 (L)  20 - 50 % Final    UIBC 03/05/2025 384.0 (H)  112.0 - 347.0 ug/dL Final    Ferritin 03/05/2025 78  8 - 388 NG/ML Final    Sodium 03/05/2025 139  136 - 145 mmol/L Final    Potassium 03/05/2025 4.3  3.5 - 5.1 mmol/L Final    Chloride 03/05/2025 103  98 - 107 mmol/L Final    CO2 03/05/2025 27  20 - 29 mmol/L Final    Anion Gap 03/05/2025 9  7 - 16 mmol/L Final    Glucose 03/05/2025 77  70 - 99 mg/dL Final    Comment: <70 mg/dL Consistent with, but not fully diagnostic of hypoglycemia.  100 - 125 mg/dL Impaired fasting glucose/consistent with pre-diabetes mellitus.  > 126 mg/dl Fasting glucose consistent with overt diabetes mellitus      BUN 03/05/2025 14  8 - 23 MG/DL Final    Creatinine 03/05/2025 1.25 (H)  0.60 - 1.10 MG/DL Final    Est, Glom Filt Rate 03/05/2025 47 (L)  >60 ml/min/1.73m2 Final    Comment:    Pediatric calculator link: https://www.kidney.org/professionals/kdoqi/gfr_calculatorped     These results are not intended for use in patients <18 years of age.     eGFR results are calculated without a race factor using  the 2021 CKD-EPI equation. Careful clinical correlation is recommended, particularly when comparing to results calculated using previous equations.  The CKD-EPI equation is less accurate in patients with extremes of muscle mass, extra-renal metabolism of creatinine, excessive creatine

## 2025-03-05 NOTE — PROGRESS NOTES
proper response, has had multiple GI evaluation with most recent EGD/colonoscopy and previous capsule endoscopy 6 years ago, follow PCP and cardiology for further care and return when needed again.    All questions are answered to her satisfaction.  She will call for further questions and concerns.        ECOG PERFORMANCE STATUS - 0-Fully active, able to carry on all pre-disease performance without restriction.    Pain - 9/10. Mild to moderate pain, requiring medication - see MAR     Fatigue - Failed to redirect to the Timeline version of the [employer] SmartLink.  Distress -        No data to display                  Elements of this note have been dictated via voice recognition software.  Text and phrases may be limited by the accuracy and autoconversion of the software.  The chart has been reviewed, but errors may still be present.          [place] [address]  Office : [phone]  Fax : [phone]

## 2025-03-06 ENCOUNTER — TELEPHONE (OUTPATIENT)
Age: 70
End: 2025-03-06

## 2025-03-06 ENCOUNTER — OFFICE VISIT (OUTPATIENT)
Age: 70
End: 2025-03-06
Payer: MEDICARE

## 2025-03-06 VITALS
HEIGHT: 65 IN | WEIGHT: 160 LBS | DIASTOLIC BLOOD PRESSURE: 62 MMHG | SYSTOLIC BLOOD PRESSURE: 139 MMHG | BODY MASS INDEX: 26.66 KG/M2 | HEART RATE: 69 BPM

## 2025-03-06 DIAGNOSIS — D64.9 ANEMIA, UNSPECIFIED TYPE: ICD-10-CM

## 2025-03-06 DIAGNOSIS — Z79.899 LONG TERM CURRENT USE OF ANTIARRHYTHMIC DRUG: ICD-10-CM

## 2025-03-06 DIAGNOSIS — M79.89 LEG SWELLING: ICD-10-CM

## 2025-03-06 DIAGNOSIS — M79.89 LEG SWELLING: Primary | ICD-10-CM

## 2025-03-06 DIAGNOSIS — I10 HYPERTENSION, ESSENTIAL: ICD-10-CM

## 2025-03-06 DIAGNOSIS — I48.11 LONGSTANDING PERSISTENT ATRIAL FIBRILLATION (HCC): Primary | ICD-10-CM

## 2025-03-06 PROCEDURE — 1090F PRES/ABSN URINE INCON ASSESS: CPT | Performed by: INTERNAL MEDICINE

## 2025-03-06 PROCEDURE — 3075F SYST BP GE 130 - 139MM HG: CPT | Performed by: INTERNAL MEDICINE

## 2025-03-06 PROCEDURE — 1036F TOBACCO NON-USER: CPT | Performed by: INTERNAL MEDICINE

## 2025-03-06 PROCEDURE — 1125F AMNT PAIN NOTED PAIN PRSNT: CPT | Performed by: INTERNAL MEDICINE

## 2025-03-06 PROCEDURE — G8399 PT W/DXA RESULTS DOCUMENT: HCPCS | Performed by: INTERNAL MEDICINE

## 2025-03-06 PROCEDURE — G8417 CALC BMI ABV UP PARAM F/U: HCPCS | Performed by: INTERNAL MEDICINE

## 2025-03-06 PROCEDURE — 93000 ELECTROCARDIOGRAM COMPLETE: CPT | Performed by: INTERNAL MEDICINE

## 2025-03-06 PROCEDURE — G8427 DOCREV CUR MEDS BY ELIG CLIN: HCPCS | Performed by: INTERNAL MEDICINE

## 2025-03-06 PROCEDURE — 3017F COLORECTAL CA SCREEN DOC REV: CPT | Performed by: INTERNAL MEDICINE

## 2025-03-06 PROCEDURE — 3078F DIAST BP <80 MM HG: CPT | Performed by: INTERNAL MEDICINE

## 2025-03-06 PROCEDURE — 1159F MED LIST DOCD IN RCRD: CPT | Performed by: INTERNAL MEDICINE

## 2025-03-06 PROCEDURE — 1123F ACP DISCUSS/DSCN MKR DOCD: CPT | Performed by: INTERNAL MEDICINE

## 2025-03-06 PROCEDURE — 99214 OFFICE O/P EST MOD 30 MIN: CPT | Performed by: INTERNAL MEDICINE

## 2025-03-06 RX ORDER — NEBIVOLOL 10 MG/1
10 TABLET ORAL DAILY
Qty: 90 TABLET | Refills: 3 | Status: SHIPPED | OUTPATIENT
Start: 2025-03-06

## 2025-03-06 NOTE — CARE COORDINATION
Care Transitions Note    Initial Call - Call within 2 business days of discharge: Yes    Attempted to reach patient for transitions of care follow up. Unable to reach patient.    Outreach Attempts:   HIPAA compliant voicemail left for patient.     Patient: Lachelle Quintero    Patient : 1955   MRN: [id number]    Reason for Admission: ymptomatic anemia  Discharge Date: 25  RURS: No data recorded  Last Discharge Facility       Date Complaint Diagnosis Description Type Department Provider    25 Fatigue Symptomatic anemia ED to Hosp-Admission (Discharged) (ADMITTED) [id number] Iva Vaughn MD; Lavonne Harrell...            Was this an external facility discharge? No    Follow Up Appointment:   Patient has hospital follow up appointment scheduled within 7 days of discharge.    Future Appointments         Provider Specialty Dept Phone    3/6/2025 8:00 AM Stephen Joseph MD Cardiology [phone]    3/6/2025 2:30 PM (Arrive by 2:15 PM) [facility] Radiology [phone]    3/11/2025 8:00 AM Samir Mcwilliams MD Family Medicine [phone]    3/13/2025 7:15 AM [facility] Infusion Therapy [phone]    2025 1:00 PM Stephen Joseph MD Cardiology [phone]            Plan for follow-up on next business day.      Sarah Gaines RN

## 2025-03-06 NOTE — PROGRESS NOTES
K/uL    RBC 3.89 (L) 4.05 - 5.2 M/uL    Hemoglobin 10.2 (L) 11.7 - 15.4 g/dL    Hematocrit 34.1 (L) 35.8 - 46.3 %    MCV 87.7 82.0 - 102.0 FL    MCH 26.2 26.1 - 32.9 PG    MCHC 29.9 (L) 31.4 - 35.0 g/dL    RDW 19.2 (H) 11.9 - 14.6 %    Platelets 263 150 - 450 K/uL    MPV 10.1 9.4 - 12.3 FL    nRBC 0.00 0.0 - 0.2 K/uL    Neutrophils % 70.8 43.0 - 78.0 %    Lymphocytes % 20.3 13.0 - 44.0 %    Monocytes % 6.8 4.0 - 12.0 %    Eosinophils % 0.8 0.5 - 7.8 %    Basophils % 0.9 0.0 - 2.0 %    Immature Granulocytes % 0.4 0.0 - 5.0 %    Neutrophils Absolute 3.73 1.70 - 8.20 K/UL    Lymphocytes Absolute 1.07 0.50 - 4.60 K/UL    Monocytes Absolute 0.36 0.10 - 1.30 K/UL    Eosinophils Absolute 0.04 0.00 - 0.80 K/UL    Basophils Absolute 0.05 0.00 - 0.20 K/UL    Immature Granulocytes Absolute 0.02 0.00 - 0.50 K/UL    Differential Type AUTOMATED       Lab Results   Component Value Date/Time    CHOL 128 11/01/2024 09:01 AM    HDL 62 11/01/2024 09:01 AM    LDL 55 11/01/2024 09:01 AM    LDL 63 04/29/2024 07:57 AM    VLDL 11 11/01/2024 09:01 AM    VLDL 20 03/14/2022 11:11 AM       No results found for any visits on 03/06/25.        ERIN Larose was seen today for results, irregular heart beat and hypertension.    Diagnoses and all orders for this visit:    AF (atrial fibrillation) (HCC)  -     Cancel: EKG 12 lead  -     EKG 12 lead      No follow-ups on file.       Stephen Joseph MD  3/6/2025  8:02 AM      The patient (or guardian, if applicable) and other individuals in attendance with the patient were advised that Artificial Intelligence will be utilized during this visit to record, process the conversation to generate a clinical note, and support improvement of the AI technology. The patient (or guardian, if applicable) and other individuals in attendance at the appointment consented to the use of AI, including the recording.                  96

## 2025-03-11 ENCOUNTER — OFFICE VISIT (OUTPATIENT)
Dept: FAMILY MEDICINE CLINIC | Facility: CLINIC | Age: 70
End: 2025-03-11
Payer: MEDICARE

## 2025-03-11 VITALS
TEMPERATURE: 98 F | HEIGHT: 65 IN | OXYGEN SATURATION: 98 % | BODY MASS INDEX: 25.06 KG/M2 | WEIGHT: 150.4 LBS | HEART RATE: 100 BPM | SYSTOLIC BLOOD PRESSURE: 120 MMHG | DIASTOLIC BLOOD PRESSURE: 60 MMHG

## 2025-03-11 DIAGNOSIS — E11.9 TYPE 2 DIABETES MELLITUS WITHOUT COMPLICATION, WITHOUT LONG-TERM CURRENT USE OF INSULIN (HCC): ICD-10-CM

## 2025-03-11 DIAGNOSIS — K63.5 POLYP OF COLON, UNSPECIFIED PART OF COLON, UNSPECIFIED TYPE: ICD-10-CM

## 2025-03-11 DIAGNOSIS — H35.3290 EXUDATIVE AGE-RELATED MACULAR DEGENERATION, UNSPECIFIED LATERALITY, UNSPECIFIED STAGE (HCC): ICD-10-CM

## 2025-03-11 DIAGNOSIS — E78.2 MIXED HYPERLIPIDEMIA: ICD-10-CM

## 2025-03-11 DIAGNOSIS — I48.91 ATRIAL FIBRILLATION, UNSPECIFIED TYPE (HCC): ICD-10-CM

## 2025-03-11 DIAGNOSIS — E55.9 VITAMIN D DEFICIENCY: ICD-10-CM

## 2025-03-11 DIAGNOSIS — D50.8 OTHER IRON DEFICIENCY ANEMIA: Primary | ICD-10-CM

## 2025-03-11 DIAGNOSIS — I48.0 PAF (PAROXYSMAL ATRIAL FIBRILLATION) (HCC): ICD-10-CM

## 2025-03-11 DIAGNOSIS — E03.8 OTHER SPECIFIED HYPOTHYROIDISM: ICD-10-CM

## 2025-03-11 DIAGNOSIS — K22.70 BARRETT'S ESOPHAGUS WITHOUT DYSPLASIA: ICD-10-CM

## 2025-03-11 DIAGNOSIS — F51.01 PRIMARY INSOMNIA: ICD-10-CM

## 2025-03-11 LAB
25(OH)D3 SERPL-MCNC: 34.5 NG/ML (ref 30–100)
ALBUMIN SERPL-MCNC: 3.8 G/DL (ref 3.2–4.6)
ALBUMIN/GLOB SERPL: 1.3 (ref 1–1.9)
ALP SERPL-CCNC: 70 U/L (ref 35–104)
ALT SERPL-CCNC: 20 U/L (ref 8–45)
ANION GAP SERPL CALC-SCNC: 11 MMOL/L (ref 7–16)
AST SERPL-CCNC: 21 U/L (ref 15–37)
BASOPHILS # BLD: 0.05 K/UL (ref 0–0.2)
BASOPHILS NFR BLD: 0.7 % (ref 0–2)
BILIRUB SERPL-MCNC: 0.5 MG/DL (ref 0–1.2)
BUN SERPL-MCNC: 19 MG/DL (ref 8–23)
CALCIUM SERPL-MCNC: 10.1 MG/DL (ref 8.8–10.2)
CHLORIDE SERPL-SCNC: 100 MMOL/L (ref 98–107)
CHOLEST SERPL-MCNC: 146 MG/DL (ref 0–200)
CO2 SERPL-SCNC: 28 MMOL/L (ref 20–29)
CREAT SERPL-MCNC: 1.5 MG/DL (ref 0.6–1.1)
DIFFERENTIAL METHOD BLD: ABNORMAL
EOSINOPHIL # BLD: 0.08 K/UL (ref 0–0.8)
EOSINOPHIL NFR BLD: 1.1 % (ref 0.5–7.8)
ERYTHROCYTE [DISTWIDTH] IN BLOOD BY AUTOMATED COUNT: 20.7 % (ref 11.9–14.6)
EST. AVERAGE GLUCOSE BLD GHB EST-MCNC: 102 MG/DL
FERRITIN SERPL-MCNC: 44 NG/ML (ref 8–388)
GLOBULIN SER CALC-MCNC: 2.9 G/DL (ref 2.3–3.5)
GLUCOSE SERPL-MCNC: 75 MG/DL (ref 70–99)
HBA1C MFR BLD: 5.2 % (ref 0–5.6)
HCT VFR BLD AUTO: 35.6 % (ref 35.8–46.3)
HDLC SERPL-MCNC: 56 MG/DL (ref 40–60)
HDLC SERPL: 2.6 (ref 0–5)
HGB BLD-MCNC: 11.2 G/DL (ref 11.7–15.4)
IMM GRANULOCYTES # BLD AUTO: 0.02 K/UL (ref 0–0.5)
IMM GRANULOCYTES NFR BLD AUTO: 0.3 % (ref 0–5)
IRON SERPL-MCNC: 59 UG/DL (ref 35–100)
LDLC SERPL CALC-MCNC: 67 MG/DL (ref 0–100)
LYMPHOCYTES # BLD: 1.49 K/UL (ref 0.5–4.6)
LYMPHOCYTES NFR BLD: 21.4 % (ref 13–44)
MCH RBC QN AUTO: 27.3 PG (ref 26.1–32.9)
MCHC RBC AUTO-ENTMCNC: 31.5 G/DL (ref 31.4–35)
MCV RBC AUTO: 86.8 FL (ref 82–102)
MONOCYTES # BLD: 0.6 K/UL (ref 0.1–1.3)
MONOCYTES NFR BLD: 8.6 % (ref 4–12)
NEUTS SEG # BLD: 4.73 K/UL (ref 1.7–8.2)
NEUTS SEG NFR BLD: 67.9 % (ref 43–78)
NRBC # BLD: 0 K/UL (ref 0–0.2)
PLATELET # BLD AUTO: 282 K/UL (ref 150–450)
PMV BLD AUTO: 10.8 FL (ref 9.4–12.3)
POTASSIUM SERPL-SCNC: 4.3 MMOL/L (ref 3.5–5.1)
PROT SERPL-MCNC: 6.8 G/DL (ref 6.3–8.2)
RBC # BLD AUTO: 4.1 M/UL (ref 4.05–5.2)
SODIUM SERPL-SCNC: 140 MMOL/L (ref 136–145)
TRIGL SERPL-MCNC: 113 MG/DL (ref 0–150)
TSH, 3RD GENERATION: 1.36 UIU/ML (ref 0.27–4.2)
VLDLC SERPL CALC-MCNC: 23 MG/DL (ref 6–23)
WBC # BLD AUTO: 7 K/UL (ref 4.3–11.1)

## 2025-03-11 PROCEDURE — 1160F RVW MEDS BY RX/DR IN RCRD: CPT | Performed by: FAMILY MEDICINE

## 2025-03-11 PROCEDURE — 99214 OFFICE O/P EST MOD 30 MIN: CPT | Performed by: FAMILY MEDICINE

## 2025-03-11 PROCEDURE — 2022F DILAT RTA XM EVC RTNOPTHY: CPT | Performed by: FAMILY MEDICINE

## 2025-03-11 PROCEDURE — 1090F PRES/ABSN URINE INCON ASSESS: CPT | Performed by: FAMILY MEDICINE

## 2025-03-11 PROCEDURE — G8417 CALC BMI ABV UP PARAM F/U: HCPCS | Performed by: FAMILY MEDICINE

## 2025-03-11 PROCEDURE — 3078F DIAST BP <80 MM HG: CPT | Performed by: FAMILY MEDICINE

## 2025-03-11 PROCEDURE — 3017F COLORECTAL CA SCREEN DOC REV: CPT | Performed by: FAMILY MEDICINE

## 2025-03-11 PROCEDURE — 1036F TOBACCO NON-USER: CPT | Performed by: FAMILY MEDICINE

## 2025-03-11 PROCEDURE — 1159F MED LIST DOCD IN RCRD: CPT | Performed by: FAMILY MEDICINE

## 2025-03-11 PROCEDURE — G8427 DOCREV CUR MEDS BY ELIG CLIN: HCPCS | Performed by: FAMILY MEDICINE

## 2025-03-11 PROCEDURE — 3074F SYST BP LT 130 MM HG: CPT | Performed by: FAMILY MEDICINE

## 2025-03-11 PROCEDURE — 3044F HG A1C LEVEL LT 7.0%: CPT | Performed by: FAMILY MEDICINE

## 2025-03-11 PROCEDURE — 1123F ACP DISCUSS/DSCN MKR DOCD: CPT | Performed by: FAMILY MEDICINE

## 2025-03-11 PROCEDURE — G8399 PT W/DXA RESULTS DOCUMENT: HCPCS | Performed by: FAMILY MEDICINE

## 2025-03-11 RX ORDER — ZOLPIDEM TARTRATE 10 MG/1
10 TABLET ORAL NIGHTLY
Qty: 30 TABLET | Refills: 2 | Status: SHIPPED | OUTPATIENT
Start: 2025-03-11 | End: 2025-06-09

## 2025-03-12 ASSESSMENT — ENCOUNTER SYMPTOMS
SORE THROAT: 0
COUGH: 0
ABDOMINAL PAIN: 0
EYE PAIN: 0
BLOOD IN STOOL: 0
ABDOMINAL DISTENTION: 0
COLOR CHANGE: 0
SHORTNESS OF BREATH: 0
BLURRED VISION: 0
PHOTOPHOBIA: 0
NAUSEA: 0

## 2025-03-13 ENCOUNTER — HOSPITAL ENCOUNTER (OUTPATIENT)
Dept: INFUSION THERAPY | Age: 70
Setting detail: INFUSION SERIES
End: 2025-03-13

## 2025-03-13 NOTE — PROGRESS NOTES
Lachelle Quintero  1955 is a 70 y.o. female ,Established patient, here for evaluation of the following chief complaint(s):   Chief Complaint   Patient presents with    Follow-up     2 month--- was in hospital was 3 weeks ago so would like to have labs checked--- pt is fasting---     Medication Refill          1. Other iron deficiency anemia--WILL CHECK HGB HERE AS REQUESTED EACH WEEK  -     CBC with Auto Differential; Future  -     Iron; Future  -     Ferritin; Future  -     [facility] Faraz Sellers MD, Gastroenterology, [facility]  2. Exudative age-related macular degeneration, unspecified laterality, unspecified stage ([facility])  3. Type 2 diabetes mellitus without complication, without long-term current use of insulin ([facility])  -     Comprehensive Metabolic Panel; Future  -     Hemoglobin A1C; Future  4. Other specified hypothyroidism  -     TSH; Future  5. Primary insomnia  -     zolpidem (AMBIEN) 10 MG tablet; Take 1 tablet by mouth at bedtime for 90 days. Max Daily Amount: 10 mg, Disp-30 tablet, R-2Normal  6. Vitamin D deficiency  -     Vitamin D 25 Hydroxy; Future  7. Mixed hyperlipidemia  -     Lipid Panel; Future  8. PAF (paroxysmal atrial fibrillation) ([facility])  9. Alicia's esophagus without dysplasia  -     Faraz Yadav MD, Gastroenterology, [facility]  10. Polyp of colon, unspecified part of colon, unspecified type  -     [facility]        Return in about 4 weeks (around 4/8/2025).        Subjective   SUBJECTIVE/OBJECTIVE:  She saw cardiology on 3/6 ; follow up of ATRIAL FIBRILLATION AND EDEMA.---her amiodarone was stopped and eliquis was restarted.    She has iron deficiency and has had to have iron infusions as her hgb has dropped frequently-----HEMATOLOGY WOULD LIKE TO HAVE HER HGB CHECKED WEEKLY    Medication Refill  This is a chronic problem. The current episode started more than 1 year ago. The problem occurs daily. The problem has been unchanged. Pertinent negatives

## 2025-03-14 DIAGNOSIS — I12.9 CHRONIC KIDNEY DISEASE DUE TO HYPERTENSION: Primary | ICD-10-CM

## 2025-03-18 ENCOUNTER — LAB (OUTPATIENT)
Dept: FAMILY MEDICINE CLINIC | Facility: CLINIC | Age: 70
End: 2025-03-18

## 2025-03-18 ENCOUNTER — TELEPHONE (OUTPATIENT)
Dept: FAMILY MEDICINE CLINIC | Facility: CLINIC | Age: 70
End: 2025-03-18

## 2025-03-18 DIAGNOSIS — N18.32 STAGE 3B CHRONIC KIDNEY DISEASE (HCC): Primary | ICD-10-CM

## 2025-03-18 DIAGNOSIS — I12.9 CHRONIC KIDNEY DISEASE DUE TO HYPERTENSION: ICD-10-CM

## 2025-03-18 LAB
ALBUMIN SERPL-MCNC: 3.9 G/DL (ref 3.2–4.6)
ALBUMIN/GLOB SERPL: 1.5 (ref 1–1.9)
ALP SERPL-CCNC: 69 U/L (ref 35–104)
ALT SERPL-CCNC: 18 U/L (ref 8–45)
ANION GAP SERPL CALC-SCNC: 12 MMOL/L (ref 7–16)
AST SERPL-CCNC: 24 U/L (ref 15–37)
BILIRUB SERPL-MCNC: 0.8 MG/DL (ref 0–1.2)
BUN SERPL-MCNC: 18 MG/DL (ref 8–23)
CALCIUM SERPL-MCNC: 9.9 MG/DL (ref 8.8–10.2)
CHLORIDE SERPL-SCNC: 98 MMOL/L (ref 98–107)
CO2 SERPL-SCNC: 28 MMOL/L (ref 20–29)
CREAT SERPL-MCNC: 1.68 MG/DL (ref 0.6–1.1)
GLOBULIN SER CALC-MCNC: 2.7 G/DL (ref 2.3–3.5)
GLUCOSE SERPL-MCNC: 97 MG/DL (ref 70–99)
POTASSIUM SERPL-SCNC: 3.6 MMOL/L (ref 3.5–5.1)
PROT SERPL-MCNC: 6.5 G/DL (ref 6.3–8.2)
SODIUM SERPL-SCNC: 137 MMOL/L (ref 136–145)

## 2025-03-18 NOTE — TELEPHONE ENCOUNTER
Patient was in getting labs and let me know she has never seen a nephrologist and would like a referral put in for one.

## 2025-03-25 ENCOUNTER — LAB (OUTPATIENT)
Dept: FAMILY MEDICINE CLINIC | Facility: CLINIC | Age: 70
End: 2025-03-25

## 2025-03-25 DIAGNOSIS — D50.8 OTHER IRON DEFICIENCY ANEMIA: Primary | ICD-10-CM

## 2025-03-25 LAB — HGB BLD-MCNC: 10.7 G/DL (ref 11.7–15.4)

## 2025-03-27 ENCOUNTER — TELEPHONE (OUTPATIENT)
Age: 70
End: 2025-03-27

## 2025-03-27 DIAGNOSIS — D50.8 OTHER IRON DEFICIENCY ANEMIA: Primary | ICD-10-CM

## 2025-03-27 NOTE — TELEPHONE ENCOUNTER
Most recent hemoglobin showed only a mild change which is in the margin of lab variability.  If she is willing to stay on the medication longer we should keep checking hemoglobin levels.

## 2025-03-27 NOTE — TELEPHONE ENCOUNTER
I spoke with the pt and gave Dr. Joseph's response. She is ok with continuing the medication and says her next hemoglobin check will be next Tuesday. I confirmed pts appt with Dr. Joseph 4/10/2025 and she will discuss it further at the appt.

## 2025-03-27 NOTE — TELEPHONE ENCOUNTER
Pt stated when she got blood work done Tuesday her hemoglobin has went back down,she want to let him know....want to know if she can stop Eliquis     Please call advise pt

## 2025-04-01 ENCOUNTER — LAB (OUTPATIENT)
Dept: FAMILY MEDICINE CLINIC | Facility: CLINIC | Age: 70
End: 2025-04-01

## 2025-04-01 DIAGNOSIS — D50.8 OTHER IRON DEFICIENCY ANEMIA: ICD-10-CM

## 2025-04-01 LAB — HGB BLD-MCNC: 10.8 G/DL (ref 11.7–15.4)

## 2025-04-07 ENCOUNTER — LAB (OUTPATIENT)
Dept: FAMILY MEDICINE CLINIC | Facility: CLINIC | Age: 70
End: 2025-04-07

## 2025-04-07 DIAGNOSIS — N18.30 STAGE 3 CHRONIC KIDNEY DISEASE, UNSPECIFIED WHETHER STAGE 3A OR 3B CKD (HCC): ICD-10-CM

## 2025-04-07 DIAGNOSIS — D63.8 ANEMIA IN OTHER CHRONIC DISEASES CLASSIFIED ELSEWHERE: Primary | ICD-10-CM

## 2025-04-07 LAB
ANION GAP SERPL CALC-SCNC: 11 MMOL/L (ref 7–16)
BASOPHILS # BLD: 0.07 K/UL (ref 0–0.2)
BASOPHILS NFR BLD: 1.2 % (ref 0–2)
BUN SERPL-MCNC: 18 MG/DL (ref 8–23)
CALCIUM SERPL-MCNC: 10.1 MG/DL (ref 8.8–10.2)
CHLORIDE SERPL-SCNC: 98 MMOL/L (ref 98–107)
CO2 SERPL-SCNC: 29 MMOL/L (ref 20–29)
CREAT SERPL-MCNC: 1.63 MG/DL (ref 0.6–1.1)
DIFFERENTIAL METHOD BLD: ABNORMAL
EOSINOPHIL # BLD: 0.06 K/UL (ref 0–0.8)
EOSINOPHIL NFR BLD: 1.1 % (ref 0.5–7.8)
ERYTHROCYTE [DISTWIDTH] IN BLOOD BY AUTOMATED COUNT: 19 % (ref 11.9–14.6)
GLUCOSE SERPL-MCNC: 82 MG/DL (ref 70–99)
HCT VFR BLD AUTO: 33.6 % (ref 35.8–46.3)
HGB BLD-MCNC: 10.8 G/DL (ref 11.7–15.4)
IMM GRANULOCYTES # BLD AUTO: 0.02 K/UL (ref 0–0.5)
IMM GRANULOCYTES NFR BLD AUTO: 0.4 % (ref 0–5)
LYMPHOCYTES # BLD: 1.05 K/UL (ref 0.5–4.6)
LYMPHOCYTES NFR BLD: 18.5 % (ref 13–44)
MCH RBC QN AUTO: 27.4 PG (ref 26.1–32.9)
MCHC RBC AUTO-ENTMCNC: 32.1 G/DL (ref 31.4–35)
MCV RBC AUTO: 85.3 FL (ref 82–102)
MONOCYTES # BLD: 0.44 K/UL (ref 0.1–1.3)
MONOCYTES NFR BLD: 7.7 % (ref 4–12)
NEUTS SEG # BLD: 4.05 K/UL (ref 1.7–8.2)
NEUTS SEG NFR BLD: 71.1 % (ref 43–78)
NRBC # BLD: 0 K/UL (ref 0–0.2)
PLATELET # BLD AUTO: 223 K/UL (ref 150–450)
PMV BLD AUTO: 10.1 FL (ref 9.4–12.3)
POTASSIUM SERPL-SCNC: 4.1 MMOL/L (ref 3.5–5.1)
RBC # BLD AUTO: 3.94 M/UL (ref 4.05–5.2)
SODIUM SERPL-SCNC: 138 MMOL/L (ref 136–145)
WBC # BLD AUTO: 5.7 K/UL (ref 4.3–11.1)

## 2025-04-10 ENCOUNTER — OFFICE VISIT (OUTPATIENT)
Age: 70
End: 2025-04-10
Payer: MEDICARE

## 2025-04-10 VITALS
SYSTOLIC BLOOD PRESSURE: 122 MMHG | HEART RATE: 64 BPM | HEIGHT: 65 IN | BODY MASS INDEX: 23.82 KG/M2 | DIASTOLIC BLOOD PRESSURE: 74 MMHG | WEIGHT: 143 LBS

## 2025-04-10 DIAGNOSIS — I48.0 PAROXYSMAL ATRIAL FIBRILLATION (HCC): Primary | ICD-10-CM

## 2025-04-10 DIAGNOSIS — D64.9 ANEMIA, UNSPECIFIED TYPE: ICD-10-CM

## 2025-04-10 DIAGNOSIS — D50.8 OTHER IRON DEFICIENCY ANEMIA: Primary | ICD-10-CM

## 2025-04-10 DIAGNOSIS — E78.5 HYPERLIPIDEMIA LDL GOAL <70: ICD-10-CM

## 2025-04-10 PROCEDURE — G8427 DOCREV CUR MEDS BY ELIG CLIN: HCPCS | Performed by: INTERNAL MEDICINE

## 2025-04-10 PROCEDURE — G8399 PT W/DXA RESULTS DOCUMENT: HCPCS | Performed by: INTERNAL MEDICINE

## 2025-04-10 PROCEDURE — 1159F MED LIST DOCD IN RCRD: CPT | Performed by: INTERNAL MEDICINE

## 2025-04-10 PROCEDURE — 1090F PRES/ABSN URINE INCON ASSESS: CPT | Performed by: INTERNAL MEDICINE

## 2025-04-10 PROCEDURE — 3074F SYST BP LT 130 MM HG: CPT | Performed by: INTERNAL MEDICINE

## 2025-04-10 PROCEDURE — 1126F AMNT PAIN NOTED NONE PRSNT: CPT | Performed by: INTERNAL MEDICINE

## 2025-04-10 PROCEDURE — 3017F COLORECTAL CA SCREEN DOC REV: CPT | Performed by: INTERNAL MEDICINE

## 2025-04-10 PROCEDURE — 1123F ACP DISCUSS/DSCN MKR DOCD: CPT | Performed by: INTERNAL MEDICINE

## 2025-04-10 PROCEDURE — G8420 CALC BMI NORM PARAMETERS: HCPCS | Performed by: INTERNAL MEDICINE

## 2025-04-10 PROCEDURE — 1036F TOBACCO NON-USER: CPT | Performed by: INTERNAL MEDICINE

## 2025-04-10 PROCEDURE — 99214 OFFICE O/P EST MOD 30 MIN: CPT | Performed by: INTERNAL MEDICINE

## 2025-04-10 PROCEDURE — 3078F DIAST BP <80 MM HG: CPT | Performed by: INTERNAL MEDICINE

## 2025-04-10 NOTE — PROGRESS NOTES
daily    nitroGLYCERIN (NITROSTAT) 0.4 MG SL tablet Place 1 tablet under the tongue every 5 minutes as needed for Chest pain    semaglutide, 2 MG/DOSE, (OZEMPIC, 2 MG/DOSE,) 8 MG/3ML SOPN sc injection Inject 2 mg into the skin every 7 days    ondansetron (ZOFRAN) 4 MG tablet Take 1 tablet by mouth 3 times daily as needed for Nausea or Vomiting    levothyroxine (SYNTHROID) 88 MCG tablet Take 1 tablet by mouth every morning (before breakfast)    dexlansoprazole (DEXILANT) 60 MG CPDR delayed release capsule TAKE 1 CAPSULE (60 MG) BY MOUTH DAILY    promethazine (PHENERGAN) 25 MG tablet ONE TABLET EVERY 6 HOURS AS NEEDED FOR NAUSEA    COMBIVENT RESPIMAT  MCG/ACT AERS inhaler INHALE 2 PUFFS EVERY 6 (SIX) HOURS AS NEEDED (WHEEZING, SHORTNESS OF BREATH)    Fluticasone-Umeclidin-Vilant (TRELEGY ELLIPTA) 200-62.5-25 MCG/INH AEPB INHALE 1 PUFF DAILY    magnesium oxide (MAG-OX) 400 MG tablet Take 1 tablet by mouth daily    oxyCODONE (OXY-IR) 15 MG immediate release tablet TAKE 1 TABLET EVERY 6 HOURS    Roflumilast (DALIRESP) 500 MCG tablet Take 1 tablet by mouth daily     No current facility-administered medications for this visit.       Past Medical History, Past Surgical History, Family history, Social History, and Medications were all reviewed with the patient today and updated as necessary.       Allergies   Allergen Reactions    Ace Inhibitors Angioedema and Swelling     LIPS, FACE AND THROAT      Codeine Itching, Other (See Comments) and Nausea And Vomiting     DERIVATIVES    Other reaction(s): hives    Morphine Anxiety and Other (See Comments)     Other reaction(s): Rage  \" only in pill form,\" caused severe agitation.     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

## 2025-04-11 ENCOUNTER — OFFICE VISIT (OUTPATIENT)
Dept: FAMILY MEDICINE CLINIC | Facility: CLINIC | Age: 70
End: 2025-04-11
Payer: MEDICARE

## 2025-04-11 VITALS
BODY MASS INDEX: 24.03 KG/M2 | HEART RATE: 74 BPM | DIASTOLIC BLOOD PRESSURE: 88 MMHG | TEMPERATURE: 98.2 F | SYSTOLIC BLOOD PRESSURE: 120 MMHG | WEIGHT: 144.2 LBS | HEIGHT: 65 IN

## 2025-04-11 DIAGNOSIS — K31.84 GASTROPARESIS DIABETICORUM (HCC): ICD-10-CM

## 2025-04-11 DIAGNOSIS — I48.92 ATRIAL FLUTTER, UNSPECIFIED TYPE (HCC): ICD-10-CM

## 2025-04-11 DIAGNOSIS — R11.0 NAUSEA: Primary | ICD-10-CM

## 2025-04-11 DIAGNOSIS — E11.43 GASTROPARESIS DIABETICORUM (HCC): ICD-10-CM

## 2025-04-11 PROCEDURE — 3079F DIAST BP 80-89 MM HG: CPT | Performed by: FAMILY MEDICINE

## 2025-04-11 PROCEDURE — 3017F COLORECTAL CA SCREEN DOC REV: CPT | Performed by: FAMILY MEDICINE

## 2025-04-11 PROCEDURE — 1036F TOBACCO NON-USER: CPT | Performed by: FAMILY MEDICINE

## 2025-04-11 PROCEDURE — 1160F RVW MEDS BY RX/DR IN RCRD: CPT | Performed by: FAMILY MEDICINE

## 2025-04-11 PROCEDURE — 1159F MED LIST DOCD IN RCRD: CPT | Performed by: FAMILY MEDICINE

## 2025-04-11 PROCEDURE — 2022F DILAT RTA XM EVC RTNOPTHY: CPT | Performed by: FAMILY MEDICINE

## 2025-04-11 PROCEDURE — G8399 PT W/DXA RESULTS DOCUMENT: HCPCS | Performed by: FAMILY MEDICINE

## 2025-04-11 PROCEDURE — 99213 OFFICE O/P EST LOW 20 MIN: CPT | Performed by: FAMILY MEDICINE

## 2025-04-11 PROCEDURE — 1090F PRES/ABSN URINE INCON ASSESS: CPT | Performed by: FAMILY MEDICINE

## 2025-04-11 PROCEDURE — G8427 DOCREV CUR MEDS BY ELIG CLIN: HCPCS | Performed by: FAMILY MEDICINE

## 2025-04-11 PROCEDURE — 3044F HG A1C LEVEL LT 7.0%: CPT | Performed by: FAMILY MEDICINE

## 2025-04-11 PROCEDURE — 3074F SYST BP LT 130 MM HG: CPT | Performed by: FAMILY MEDICINE

## 2025-04-11 PROCEDURE — G8420 CALC BMI NORM PARAMETERS: HCPCS | Performed by: FAMILY MEDICINE

## 2025-04-11 PROCEDURE — 1123F ACP DISCUSS/DSCN MKR DOCD: CPT | Performed by: FAMILY MEDICINE

## 2025-04-12 ASSESSMENT — ENCOUNTER SYMPTOMS
SHORTNESS OF BREATH: 0
EYE PAIN: 0
BLOOD IN STOOL: 0
COLOR CHANGE: 0
NAUSEA: 0
COUGH: 0
ABDOMINAL PAIN: 0
SORE THROAT: 0
PHOTOPHOBIA: 0
ABDOMINAL DISTENTION: 0

## 2025-04-24 ENCOUNTER — OFFICE VISIT (OUTPATIENT)
Age: 70
End: 2025-04-24
Payer: MEDICARE

## 2025-04-24 VITALS
HEART RATE: 64 BPM | DIASTOLIC BLOOD PRESSURE: 64 MMHG | HEIGHT: 65 IN | WEIGHT: 147.2 LBS | SYSTOLIC BLOOD PRESSURE: 122 MMHG | BODY MASS INDEX: 24.53 KG/M2

## 2025-04-24 DIAGNOSIS — I48.0 PAROXYSMAL ATRIAL FIBRILLATION (HCC): Primary | ICD-10-CM

## 2025-04-24 PROCEDURE — 93000 ELECTROCARDIOGRAM COMPLETE: CPT | Performed by: PHYSICIAN ASSISTANT

## 2025-04-24 PROCEDURE — 3074F SYST BP LT 130 MM HG: CPT | Performed by: PHYSICIAN ASSISTANT

## 2025-04-24 PROCEDURE — 1090F PRES/ABSN URINE INCON ASSESS: CPT | Performed by: PHYSICIAN ASSISTANT

## 2025-04-24 PROCEDURE — G8420 CALC BMI NORM PARAMETERS: HCPCS | Performed by: PHYSICIAN ASSISTANT

## 2025-04-24 PROCEDURE — 99214 OFFICE O/P EST MOD 30 MIN: CPT | Performed by: PHYSICIAN ASSISTANT

## 2025-04-24 PROCEDURE — G8399 PT W/DXA RESULTS DOCUMENT: HCPCS | Performed by: PHYSICIAN ASSISTANT

## 2025-04-24 PROCEDURE — 1160F RVW MEDS BY RX/DR IN RCRD: CPT | Performed by: PHYSICIAN ASSISTANT

## 2025-04-24 PROCEDURE — 3017F COLORECTAL CA SCREEN DOC REV: CPT | Performed by: PHYSICIAN ASSISTANT

## 2025-04-24 PROCEDURE — 1123F ACP DISCUSS/DSCN MKR DOCD: CPT | Performed by: PHYSICIAN ASSISTANT

## 2025-04-24 PROCEDURE — G8427 DOCREV CUR MEDS BY ELIG CLIN: HCPCS | Performed by: PHYSICIAN ASSISTANT

## 2025-04-24 PROCEDURE — 1159F MED LIST DOCD IN RCRD: CPT | Performed by: PHYSICIAN ASSISTANT

## 2025-04-24 PROCEDURE — 3078F DIAST BP <80 MM HG: CPT | Performed by: PHYSICIAN ASSISTANT

## 2025-04-24 PROCEDURE — 1036F TOBACCO NON-USER: CPT | Performed by: PHYSICIAN ASSISTANT

## 2025-04-24 PROCEDURE — 1126F AMNT PAIN NOTED NONE PRSNT: CPT | Performed by: PHYSICIAN ASSISTANT

## 2025-04-24 NOTE — PROGRESS NOTES
[address], [address]  [place] [address]  PHONE: [phone]    Lachelle Quintero  1955    SUBJECTIVE:   Lachelle Quintero is a 70 y.o. female seen for a follow up visit regarding the following:     Chief Complaint   Patient presents with    Paroxysmal atrial fibrillation      HPI:    Lachelle Quintero is a very pleasant 70 y.o. female with a past medical and cardiac history significant for CAD, hypertension, COPD, obstructive sleep apnea, chronic kidney disease, Alicia's esophagus, anemia and persistent typical appearing atrial flutter. She is referred by Dr. Joseph. She has severe symptoms. She is treated with flecainide, nebivolol and Eliquis. She is compliant with other medicines. She is s/p AFL ablation in 11/2023. She had more persistent AF in 11/2024 requiring hospitalization. She had 3 failed DCCVs, however, converted on own on floor. She remains in NSR today on amiodarone. She feels very bad on amiodarone with constant N/V. Last office visit treatment options discussed and she was scheduled for afib ablation in February. This was cancelled secondary to symptomatic anemia with Hgb of 6.4. She had anemia in December requiring blood transfusion. Had EDG/colonoscopy at [facility] on 1/14/25 that showed two non-bleeding angiodysplastic lesions in stomach treated with argon plasma coagulation and multiple polyps removed from colon.     She presents today for follow up visit. She reports feeling ok. Last Hgb stable. Continues to have palpitation. Palpitations occurring several times a week. Has associated shortness of breath and chest discomfort. Episodes are lasting ~ 5-20 minutes. Blood pressure well controlled at home.     Cardiac PMH: (Old records have been reviewed and summarized below)  Ablation (11/17/23) [place]:   Successful ablation of typical CTI-dependent flutter.  Comprehensive EP study.    Monitor (4/8/24):   Patient had a min HR of 47 bpm, max HR of 166 bpm, and

## 2025-04-25 ENCOUNTER — TELEPHONE (OUTPATIENT)
Dept: CARDIAC CATH/INVASIVE PROCEDURES | Age: 70
End: 2025-04-25

## 2025-04-25 NOTE — TELEPHONE ENCOUNTER
Patient was phoned to discuss AF ablation/Watchman implant procedure.  She will be contacted when procedure date is confirmed.  Nurse Navigator contact (Lynette High [phone]) information provided.

## (undated) DEVICE — PATCH REF EXT FOR CARTO 3 SYS (EA = 6 PACKS)

## (undated) DEVICE — PRESSURE MONITORING SET: Brand: TRUWAVE

## (undated) DEVICE — PINNACLE TIF INTRODUCER SHEATH: Brand: PINNACLE

## (undated) DEVICE — CATHETER US GE COMPATIBILITY SOUNDSTAR ECO 10FR

## (undated) DEVICE — 18G NG KIT WITH 96IN PROBE COVER (10 PK): Brand: SITE-RITE

## (undated) DEVICE — Device

## (undated) DEVICE — SYSTEM CLOSURE 6-12 FR VEN VASC VASCADE MVP

## (undated) DEVICE — TUBING PMP FOR CARTO SYS SMARTABLATE

## (undated) DEVICE — CATHETER ABLAT 8FR L115CM 1-6-2MM SPC TIP 3.5MM FJ CRV

## (undated) DEVICE — PINNACLE INTRODUCER SHEATH: Brand: PINNACLE